# Patient Record
Sex: MALE | Race: WHITE | Employment: OTHER | URBAN - NONMETROPOLITAN AREA
[De-identification: names, ages, dates, MRNs, and addresses within clinical notes are randomized per-mention and may not be internally consistent; named-entity substitution may affect disease eponyms.]

---

## 2018-06-28 ENCOUNTER — HOSPITAL ENCOUNTER (EMERGENCY)
Age: 68
Discharge: HOME | End: 2018-06-29
Payer: OTHER GOVERNMENT

## 2018-06-28 VITALS
OXYGEN SATURATION: 100 % | SYSTOLIC BLOOD PRESSURE: 167 MMHG | WEIGHT: 130 LBS | TEMPERATURE: 98.4 F | HEART RATE: 85 BPM | BODY MASS INDEX: 19.77 KG/M2 | DIASTOLIC BLOOD PRESSURE: 85 MMHG | RESPIRATION RATE: 18 BRPM

## 2018-06-28 DIAGNOSIS — R33.9 URINARY RETENTION: Primary | ICD-10-CM

## 2018-06-28 PROCEDURE — 99283 EMERGENCY DEPT VISIT LOW MDM: CPT

## 2018-06-28 PROCEDURE — 81001 URINALYSIS AUTO W/SCOPE: CPT

## 2018-06-28 PROCEDURE — 87086 URINE CULTURE/COLONY COUNT: CPT

## 2018-06-28 ASSESSMENT — ENCOUNTER SYMPTOMS
DIARRHEA: 0
NAUSEA: 0
SHORTNESS OF BREATH: 0
VOMITING: 0
COUGH: 0
SORE THROAT: 0
COLOR CHANGE: 0
ABDOMINAL DISTENTION: 1
ABDOMINAL PAIN: 0
BACK PAIN: 0

## 2018-06-29 LAB
BACTERIA: ABNORMAL /HPF
BILIRUBIN URINE: NEGATIVE
BLOOD, URINE: ABNORMAL
CASTS 2: ABNORMAL /LPF
CASTS UA: ABNORMAL /LPF
CHARACTER, URINE: CLEAR
COLOR: ABNORMAL
CRYSTALS, UA: ABNORMAL
EPITHELIAL CELLS, UA: ABNORMAL /HPF
GLUCOSE URINE: NEGATIVE MG/DL
KETONES, URINE: NEGATIVE
LEUKOCYTE ESTERASE, URINE: ABNORMAL
MISCELLANEOUS 2: ABNORMAL
NITRITE, URINE: NEGATIVE
PH UA: 5
PROTEIN UA: NEGATIVE
RBC URINE: ABNORMAL /HPF
RENAL EPITHELIAL, UA: ABNORMAL
SPECIFIC GRAVITY, URINE: 1.02 (ref 1–1.03)
UROBILINOGEN, URINE: 0.2 EU/DL
WBC UA: ABNORMAL /HPF
YEAST: ABNORMAL

## 2018-07-01 LAB — URINE CULTURE REFLEX: NORMAL

## 2018-07-02 ENCOUNTER — HOSPITAL ENCOUNTER (EMERGENCY)
Age: 68
Discharge: LEFT BEFORE DISCHARGE | End: 2018-07-02
Payer: OTHER GOVERNMENT

## 2018-07-02 VITALS
BODY MASS INDEX: 19.46 KG/M2 | OXYGEN SATURATION: 98 % | SYSTOLIC BLOOD PRESSURE: 137 MMHG | WEIGHT: 128 LBS | TEMPERATURE: 97.7 F | HEART RATE: 101 BPM | DIASTOLIC BLOOD PRESSURE: 88 MMHG | RESPIRATION RATE: 16 BRPM

## 2018-07-02 DIAGNOSIS — Z46.6 ENCOUNTER FOR FOLEY CATHETER REMOVAL: Primary | ICD-10-CM

## 2018-07-02 PROCEDURE — 99282 EMERGENCY DEPT VISIT SF MDM: CPT

## 2018-07-02 ASSESSMENT — ENCOUNTER SYMPTOMS
WHEEZING: 0
ABDOMINAL PAIN: 0
EYE REDNESS: 0
DIARRHEA: 0
BACK PAIN: 0
NAUSEA: 0
SORE THROAT: 0
RHINORRHEA: 0
VOMITING: 0
SHORTNESS OF BREATH: 0
COUGH: 0
EYE DISCHARGE: 0

## 2018-07-02 ASSESSMENT — PAIN SCALES - GENERAL: PAINLEVEL_OUTOF10: 8

## 2018-07-02 ASSESSMENT — PAIN DESCRIPTION - PAIN TYPE: TYPE: ACUTE PAIN

## 2018-07-02 ASSESSMENT — PAIN DESCRIPTION - LOCATION: LOCATION: PENIS

## 2018-07-03 ENCOUNTER — HOSPITAL ENCOUNTER (EMERGENCY)
Age: 68
Discharge: HOME | End: 2018-07-03
Payer: MEDICARE

## 2018-07-03 VITALS
RESPIRATION RATE: 16 BRPM | WEIGHT: 128 LBS | OXYGEN SATURATION: 100 % | SYSTOLIC BLOOD PRESSURE: 126 MMHG | HEART RATE: 102 BPM | BODY MASS INDEX: 19.46 KG/M2 | DIASTOLIC BLOOD PRESSURE: 66 MMHG | TEMPERATURE: 98.6 F

## 2018-07-03 DIAGNOSIS — R31.0 GROSS HEMATURIA: ICD-10-CM

## 2018-07-03 DIAGNOSIS — T83.511A URINARY TRACT INFECTION ASSOCIATED WITH INDWELLING URETHRAL CATHETER, INITIAL ENCOUNTER (HCC): Primary | ICD-10-CM

## 2018-07-03 DIAGNOSIS — N39.0 URINARY TRACT INFECTION ASSOCIATED WITH INDWELLING URETHRAL CATHETER, INITIAL ENCOUNTER (HCC): Primary | ICD-10-CM

## 2018-07-03 LAB
BACTERIA: ABNORMAL /HPF
BILIRUBIN URINE: ABNORMAL
BLOOD, URINE: ABNORMAL
CASTS UA: ABNORMAL /LPF
CHARACTER, URINE: ABNORMAL
COLOR: ABNORMAL
CRYSTALS, UA: ABNORMAL
EPITHELIAL CELLS, UA: ABNORMAL /HPF
GLUCOSE URINE: NEGATIVE MG/DL
ICTOTEST: NEGATIVE
KETONES, URINE: ABNORMAL
LEUKOCYTE ESTERASE, URINE: ABNORMAL
NITRITE, URINE: POSITIVE
PH UA: 7
PROTEIN UA: >= 300
RBC URINE: > 200 /HPF
SPECIFIC GRAVITY, URINE: 1.02 (ref 1–1.03)
UROBILINOGEN, URINE: 1 EU/DL
WBC UA: > 200 /HPF

## 2018-07-03 PROCEDURE — 81001 URINALYSIS AUTO W/SCOPE: CPT

## 2018-07-03 PROCEDURE — 51702 INSERT TEMP BLADDER CATH: CPT

## 2018-07-03 PROCEDURE — 87086 URINE CULTURE/COLONY COUNT: CPT

## 2018-07-03 PROCEDURE — 99283 EMERGENCY DEPT VISIT LOW MDM: CPT

## 2018-07-03 RX ORDER — CIPROFLOXACIN 500 MG/1
500 TABLET, FILM COATED ORAL 2 TIMES DAILY
Qty: 20 TABLET | Refills: 0 | Status: SHIPPED | OUTPATIENT
Start: 2018-07-03 | End: 2018-07-13

## 2018-07-03 ASSESSMENT — ENCOUNTER SYMPTOMS
RHINORRHEA: 0
CHEST TIGHTNESS: 0
BACK PAIN: 0
GASTROINTESTINAL NEGATIVE: 1
COUGH: 0

## 2018-07-03 NOTE — ED PROVIDER NOTES
[address]  Pt Name: Brandon Sarmiento  MRN: [id number]  [place] 1950  Date of evaluation: 7/2/2018  Provider: JUAN CARLOS Radford CNP    CHIEF COMPLAINT       Chief Complaint   Patient presents with    Other     needs key catheter removed    Penis Pain       Nurses Notes reviewed and I agree except as noted in the HPI. HISTORY OF PRESENT ILLNESS    Brandon Sarmiento is a 69 y.o. male who presents to the emergency department from home for penile pain and catheter removal. The patient reports he was seen at the [facility] 4 days ago. They placed a key catheter due to the patient having inability to urinate. The patient reports no date was specified for removal. The patient was supposed to return today, but was unable too. The patient decided to attempt to remove the catheter by himself. There is an inflated catheter balloon. The patient reports pain in his penis. The patient reports he called [facility] and was advised to come to the ED. The patient denies hematuria. Triage notes and Nursing notes were reviewed by myself. Any discrepancies are addressed above. REVIEW OF SYSTEMS     Review of Systems   Constitutional: Negative for appetite change, chills, fatigue and fever. HENT: Negative for congestion, ear pain, rhinorrhea and sore throat. Eyes: Negative for discharge, redness and visual disturbance. Respiratory: Negative for cough, shortness of breath and wheezing. Cardiovascular: Negative for chest pain, palpitations and leg swelling. Gastrointestinal: Negative for abdominal pain, diarrhea, nausea and vomiting. Genitourinary: Positive for penile pain. Negative for decreased urine volume, difficulty urinating, dysuria and hematuria. Catheter removal   Musculoskeletal: Negative for arthralgias, back pain, joint swelling and neck pain. Skin: Negative for pallor and rash. Allergic/Immunologic: Negative for environmental allergies. Neurological: Negative for dizziness, syncope, weakness, light-headedness, numbness and headaches. Hematological: Negative for adenopathy. Psychiatric/Behavioral: Negative for confusion and suicidal ideas. The patient is not nervous/anxious. PAST MEDICAL HISTORY    has a past medical history of COPD (chronic obstructive pulmonary disease) ([address] and Hypertension. SURGICAL HISTORY      has a past surgical history that includes Coronary angioplasty with stent and back surgery. CURRENT MEDICATIONS       Discharge Medication List as of 7/2/2018  9:51 PM      CONTINUE these medications which have NOT CHANGED    Details   azithromycin (ZITHROMAX) 250 MG tablet Take 2 tabs po on day one. Take 1 tab po daily on days 2-5., Disp-6 tablet, R-0Print      clopidogrel (PLAVIX) 75 MG tablet Take 75 mg by mouth daily      morphine (MSIR) 15 MG tablet Take 15 mg by mouth every 6 hours as needed for Pain      oxyCODONE-acetaminophen (PERCOCET) 5-325 MG per tablet Take 1 tablet by mouth every 4 hours as needed for Pain      aspirin 81 MG tablet Take 81 mg by mouth daily      tamsulosin (FLOMAX) 0.4 MG capsule Take 0.4 mg by mouth daily      simvastatin (ZOCOR) 80 MG tablet Take 80 mg by mouth nightly      lisinopril (PRINIVIL;ZESTRIL) 20 MG tablet Take 20 mg by mouth daily      senna-docusate (PERICOLACE) 8.6-50 MG per tablet Take 1 tablet by mouth 3 times daily      omeprazole (PRILOSEC) 20 MG capsule Take 20 mg by mouth daily      sildenafil (VIAGRA) 100 MG tablet Take 100 mg by mouth once a week             ALLERGIES     has No Known Allergies. FAMILY HISTORY     has no family status information on file. family history is not on file. SOCIAL HISTORY      reports that he has been smoking. He has been smoking about 0.25 packs per day. He has never used smokeless tobacco. He reports that he drinks alcohol. He reports that he does not use drugs.     PHYSICAL EXAM     INITIAL VITALS:  weight is 128 lb (58.1 kg). His oral temperature is 97.7 °F (36.5 °C). His blood pressure is 137/88 and his pulse is 101. His respiration is 16 and oxygen saturation is 98%. Physical Exam   Constitutional: He is oriented to person, place, and time. He appears well-developed and well-nourished. HENT:   Head: Normocephalic and atraumatic. Right Ear: External ear normal.   Left Ear: External ear normal.   Eyes: Conjunctivae are normal. Right eye exhibits no discharge. Left eye exhibits no discharge. No scleral icterus. Neck: Normal range of motion. Neck supple. No JVD present. Pulmonary/Chest: Effort normal. No stridor. No respiratory distress. Abdominal: Soft. He exhibits no distension. Genitourinary:   Genitourinary Comments: Blood in urine in the urine drainage bag   Musculoskeletal: Normal range of motion. He exhibits no edema. Neurological: He is alert and oriented to person, place, and time. He exhibits normal muscle tone. GCS eye subscore is 4. GCS verbal subscore is 5. GCS motor subscore is 6. Skin: Skin is warm and dry. He is not diaphoretic. No erythema. Psychiatric: He has a normal mood and affect. His behavior is normal.   Nursing note and vitals reviewed. DIFFERENTIAL DIAGNOSIS:   Including but not limited to Catheter removal, Penile pain, Dysuria    DIAGNOSTIC RESULTS     EKG: All EKG's are interpreted by the Emergency Department Physician who either signs or Co-signs this chart in the absence of a cardiologist.  None    RADIOLOGY: non-plain film images(s) such as CT, Ultrasound and MRI are read by the radiologist.  Plain radiographic images are visualized and preliminarily interpreted by the emergency physician unless otherwise stated below.   No orders to display         LABS:   Labs Reviewed - No data to display      [address] [place] [address] [place]:   Vitals:    Vitals:    07/02/18 1913   BP: 137/88   Pulse: 101   Resp: 16   Temp: 97.7 °F (36.5 °C)   TempSrc: Oral   SpO2: 98%   Weight: 128 lb (58.1 kg)         Pertinent Labs & Imaging studies reviewed. (See chart for details)         The patient was seen and evaluated in a timely manner for penile pain and catheter removal after the patient attempted to remove a balloon catheter by himself. The patient's vital signs were stable. During the physical exam I noted blood in urine in the urine drainage bag. The patient was informed he must urinate before leaving the ED. The patient does not want to wait to urinate and wants to leave AMA. The patient was instructed to return to the ED if he cannot urinate within 6 hours. Despite discussion with patient about reasons for waiting for urination, the patient wants to leave against medical advice. The patient is alert and oriented times three, not altered or intoxicated in any fashion, and appears capable of making informed medical decisions. I explained plainly to the patient, the possible risks of leaving against medical advice, including worsening of medical condition. They verbalize understanding to these risks and accept sole responsibility for leaving today. I did explain to them, that although they are leaving against medical advice today, they should not hesitate to return to the emergency room at any time should they change their mind, need re-evaluation or desire further care. Strict return precautions and follow up instructions were discussed with the patient with which the patient agrees     Physical assessment findings, diagnostic testing(s) if applicable, and vital signs reviewed with patient/patient representative. Questions answered. Medications as directed, including OTC medications for supportive care. Education provided on medications. Differential diagnosis(s) discussed with patient/patient representative. Home care/self care instructions reviewed with patient/patient representative.   Patient is to follow-up with family care provider in 2-3 days if no

## 2018-07-03 NOTE — ED NOTES
Pt presents to ED an states that he had his catheter removed last night and he has not been able to pee since 0700. States he only peed twice last night but not very much. Pt states he feels full.  Candy at bedside and states to place a catheter in      [facility]  07/03/18 9818

## 2018-07-04 NOTE — ED PROVIDER NOTES
[address]  Pt Name: Jennifer Piña  MRN: [id number]  [place] 1950  Date of evaluation: 7/3/2018  Provider: JUAN CARLOS Villatoro CNP    CHIEF COMPLAINT       Chief Complaint   Patient presents with    Urinary Retention       Nurses Notes reviewed and I agree except as noted in the HPI. HISTORY OF PRESENT ILLNESS    Jennifer Piña is a 69 y.o. male who presents to the emergency department from home For complaints of urinary retention. I saw this patient yesterday for a Dickinson removal.  He was discharged from the [facility] approximately a week ago after a carotid endarterectomy. He is able to void since they placed a Dickinson. He missed his appointment at the [facility] yesterday said he attempted to removed the Dickinson with the balloon inflated at home yesterday. He was here with pain and discomfort and described the Dickinson removed. After we removed the Dickinson he left AMA with instructions that if he was unable to void that he needed to come back. She states her last weight was 7 AM      Triage notes and Nursing notes were reviewed by myself. Any discrepancies are addressed above. REVIEW OF SYSTEMS     Review of Systems   Constitutional: Negative for chills, fatigue and fever. HENT: Negative for congestion, ear discharge, ear pain, postnasal drip and rhinorrhea. Respiratory: Negative for cough and chest tightness. Cardiovascular: Negative. Gastrointestinal: Negative. Genitourinary: Positive for difficulty urinating, dysuria and hematuria. Negative for enuresis and flank pain. Musculoskeletal: Negative for back pain and joint swelling. Neurological: Negative for dizziness, light-headedness, numbness and headaches. Psychiatric/Behavioral: Negative for agitation, behavioral problems and confusion. PAST MEDICAL HISTORY    has a past medical history of COPD (chronic obstructive pulmonary disease) ([facility] [address] and Hypertension.     SURGICAL HISTORY      has a past physician. PROCEDURES:  None    FINAL IMPRESSION      1. Urinary tract infection associated with indwelling urethral catheter, initial encounter ([facility] [address]    2. Gross hematuria          DISPOSITION/PLAN   DISPOSITION Decision To Discharge 07/03/2018 07:49:35 PM        PATIENT REFERRED TO:  Shea Durham, [address] [facility] [address] [place] [address]  [phone]    Schedule an appointment as soon as possible for a visit in 1 day  For follow up    [facility] Urology  [address]  [address] [facility]  Schedule an appointment as soon as possible for a visit in 2 days  For follow up      DISCHARGE MEDICATIONS:  New Prescriptions    CIPROFLOXACIN (CIPRO) 500 MG TABLET    Take 1 tablet by mouth 2 times daily for 10 days       (Please note that portions of this note were completed with a voice recognition program.  Efforts were made to edit the dictations but occasionally words are mis-transcribed.)    JUAN CARLOS Pagan - JUAN CARLOS Lorenzana CNP  07/03/18 2008

## 2018-07-05 LAB — URINE CULTURE REFLEX: NORMAL

## 2018-07-07 ENCOUNTER — HOSPITAL ENCOUNTER (EMERGENCY)
Age: 68
Discharge: HOME | End: 2018-07-07
Payer: MEDICARE

## 2018-07-07 VITALS
DIASTOLIC BLOOD PRESSURE: 90 MMHG | TEMPERATURE: 98.1 F | BODY MASS INDEX: 19.4 KG/M2 | RESPIRATION RATE: 20 BRPM | SYSTOLIC BLOOD PRESSURE: 155 MMHG | HEART RATE: 68 BPM | HEIGHT: 68 IN | WEIGHT: 128 LBS | OXYGEN SATURATION: 95 %

## 2018-07-07 DIAGNOSIS — R33.9 URINARY RETENTION: Primary | ICD-10-CM

## 2018-07-07 DIAGNOSIS — T83.9XXA PROBLEM WITH FOLEY CATHETER, INITIAL ENCOUNTER (HCC): ICD-10-CM

## 2018-07-07 DIAGNOSIS — R31.9 HEMATURIA, UNSPECIFIED TYPE: ICD-10-CM

## 2018-07-07 LAB
ALBUMIN SERPL-MCNC: 3.5 G/DL (ref 3.5–5.1)
ALP BLD-CCNC: 86 U/L (ref 38–126)
ALT SERPL-CCNC: 6 U/L (ref 11–66)
ANION GAP SERPL CALCULATED.3IONS-SCNC: 12 MEQ/L (ref 8–16)
AST SERPL-CCNC: 12 U/L (ref 5–40)
BASOPHILS # BLD: 0.4 %
BASOPHILS ABSOLUTE: 0 THOU/MM3 (ref 0–0.1)
BILIRUB SERPL-MCNC: 0.2 MG/DL (ref 0.3–1.2)
BUN BLDV-MCNC: 18 MG/DL (ref 7–22)
CALCIUM SERPL-MCNC: 9.1 MG/DL (ref 8.5–10.5)
CHLORIDE BLD-SCNC: 103 MEQ/L (ref 98–111)
CO2: 26 MEQ/L (ref 23–33)
CREAT SERPL-MCNC: 1.1 MG/DL (ref 0.4–1.2)
EOSINOPHIL # BLD: 3.3 %
EOSINOPHILS ABSOLUTE: 0.3 THOU/MM3 (ref 0–0.4)
ERYTHROCYTE [DISTWIDTH] IN BLOOD BY AUTOMATED COUNT: 14.3 % (ref 11.5–14.5)
ERYTHROCYTE [DISTWIDTH] IN BLOOD BY AUTOMATED COUNT: 48.4 FL (ref 35–45)
GFR SERPL CREATININE-BSD FRML MDRD: 66 ML/MIN/1.73M2
GLUCOSE BLD-MCNC: 94 MG/DL (ref 70–108)
HCT VFR BLD CALC: 29 % (ref 42–52)
HEMOGLOBIN: 9.3 GM/DL (ref 14–18)
IMMATURE GRANS (ABS): 0.06 THOU/MM3 (ref 0–0.07)
IMMATURE GRANULOCYTES: 0.6 %
LYMPHOCYTES # BLD: 11.2 %
LYMPHOCYTES ABSOLUTE: 1.1 THOU/MM3 (ref 1–4.8)
MCH RBC QN AUTO: 30 PG (ref 26–33)
MCHC RBC AUTO-ENTMCNC: 32.1 GM/DL (ref 32.2–35.5)
MCV RBC AUTO: 93.5 FL (ref 80–94)
MONOCYTES # BLD: 4.6 %
MONOCYTES ABSOLUTE: 0.5 THOU/MM3 (ref 0.4–1.3)
NUCLEATED RED BLOOD CELLS: 0 /100 WBC
OSMOLALITY CALCULATION: 282.9 MOSMOL/KG (ref 275–300)
PLATELET # BLD: 291 THOU/MM3 (ref 130–400)
PMV BLD AUTO: 9.4 FL (ref 9.4–12.4)
POTASSIUM SERPL-SCNC: 4 MEQ/L (ref 3.5–5.2)
RBC # BLD: 3.1 MILL/MM3 (ref 4.7–6.1)
SEG NEUTROPHILS: 79.9 %
SEGMENTED NEUTROPHILS ABSOLUTE COUNT: 8.1 THOU/MM3 (ref 1.8–7.7)
SODIUM BLD-SCNC: 141 MEQ/L (ref 135–145)
TOTAL PROTEIN: 6.2 G/DL (ref 6.1–8)
WBC # BLD: 10.2 THOU/MM3 (ref 4.8–10.8)

## 2018-07-07 PROCEDURE — 80053 COMPREHEN METABOLIC PANEL: CPT

## 2018-07-07 PROCEDURE — 36415 COLL VENOUS BLD VENIPUNCTURE: CPT

## 2018-07-07 PROCEDURE — 51702 INSERT TEMP BLADDER CATH: CPT

## 2018-07-07 PROCEDURE — 99283 EMERGENCY DEPT VISIT LOW MDM: CPT

## 2018-07-07 PROCEDURE — 85025 COMPLETE CBC W/AUTO DIFF WBC: CPT

## 2018-07-07 ASSESSMENT — ENCOUNTER SYMPTOMS
BLOOD IN STOOL: 0
CHEST TIGHTNESS: 0
WHEEZING: 0
SINUS PRESSURE: 0
SHORTNESS OF BREATH: 0
COLOR CHANGE: 0
COUGH: 0
CONSTIPATION: 0
SORE THROAT: 0
BACK PAIN: 0
RHINORRHEA: 0
ABDOMINAL DISTENTION: 0
PHOTOPHOBIA: 0
ABDOMINAL PAIN: 1
VOICE CHANGE: 0
EYE REDNESS: 0
DIARRHEA: 0
NAUSEA: 0
VOMITING: 0

## 2018-07-07 NOTE — ED TRIAGE NOTES
Arrives to ER for evaluation of urinary catheter issues. Patient states is not draining and causing a lot of pressure. Feels as if there is a clot blocking catheter from draining. Patient was seen on 6/28 for urinary retention. He was discharged from the [place] over a week ago after a carotid endarterectomy. They placed a Key at that time. He missed his appointment at the [place] 7/2 and attempted to remove the Key himself at home with the balloon inflated. He was here in ER 7/2 with pain and discomfort and described the Key being removed. After had key removed in the ER patient left AMA with instructions that if he was unable to void that he needed to come back. Returned on 7/3 with dysuria, retention and hematuria. Key was re-inserted on 7/3 as ordered by provider.

## 2018-07-07 NOTE — ED PROVIDER NOTES
[facility] Emergency Department    CHIEF COMPLAINT       Chief Complaint   Patient presents with    Urinary Catheter Problem       Nurses Notes reviewed and I agree except as noted in the HPI. HISTORY OF PRESENT ILLNESS    Lela Washington is a 69 y.o. male who presents to the ED for evaluation of problems with his catheter. The patient had carotid endarterectomy approximately 2 weeks ago and had a key placed due to the patient not being able to urinate. The patient was supposed to have his key removed 5 days ago but was unable to make his appointment and attempted to pull it out himself with the balloon inflated. The patient was in the ED on 07/03/18 after attempting to remove his key and had a new one placed. Today he reports urinary retention, hematuria and low abdominal pressure. He denies pain, fever or chills. The patient is currently taking Plavix and Aspirin. He denies leakage around the catheter. Pain description:  Onset: today  Location: problems with his catheter  Duration: constant   Character: none  Aggravating factors: none  Severity: moderate     Experienced previously: Yes    HPI was provided by the patient. REVIEW OF SYSTEMS     Review of Systems   Constitutional: Negative for appetite change, chills, diaphoresis, fatigue, fever and unexpected weight change. HENT: Negative for congestion, hearing loss, postnasal drip, rhinorrhea, sinus pressure, sore throat and voice change. Eyes: Negative for photophobia, redness and visual disturbance. Respiratory: Negative for cough, chest tightness, shortness of breath and wheezing. Cardiovascular: Negative for chest pain and palpitations. Gastrointestinal: Positive for abdominal pain (pressure). Negative for abdominal distention, blood in stool, constipation, diarrhea, nausea and vomiting. Endocrine: Negative for cold intolerance, heat intolerance, polydipsia, polyphagia and polyuria.    Genitourinary: Positive for difficulty electrolyte abnormality     DIAGNOSTIC RESULTS     EKG: All EKG's are interpreted by the Emergency Department Physician who either signs or Co-signs this chart in the absence of a cardiologist.  None    RADIOLOGY: non-plain film images(s) such as CT, Ultrasound and MRI are read by the radiologist.  Plain radiographic images are visualized and preliminarily interpreted by the emergency physician unless otherwise stated below. No orders to display         LABS:   Labs Reviewed   CBC WITH AUTO DIFFERENTIAL - Abnormal; Notable for the following:        Result Value    RBC 3.10 (*)     Hemoglobin 9.3 (*)     Hematocrit 29.0 (*)     MCHC 32.1 (*)     RDW-SD 48.4 (*)     Segs Absolute 8.1 (*)     All other components within normal limits   COMPREHENSIVE METABOLIC PANEL - Abnormal; Notable for the following: Total Bilirubin 0.2 (*)     ALT 6 (*)     All other components within normal limits   GLOMERULAR FILTRATION RATE, ESTIMATED - Abnormal; Notable for the following:     Est, Glom Filt Rate 66 (*)     All other components within normal limits   ANION GAP   OSMOLALITY       EMERGENCY DEPARTMENT COURSE:   Vitals:    Vitals:    07/07/18 1454   BP: (!) 155/90   Pulse: 68   Resp: 20   Temp: 98.1 °F (36.7 °C)   TempSrc: Oral   SpO2: 95%   Weight: 128 lb (58.1 kg)   Height: 5' 8\" (1.727 m)       MDM    Patient was seen and evaluated in the emergency department, patient appeared to be in no acute distress. Vital signs were obtained, no significant findings were noted, this  hypertension was noted. Labs obtained, no significant findings noted. Dickinson catheter was replaced with a triple-lumen catheter for bladder irrigation. Significant urine output was noted with this, continues bladder irrigation was administered. The patient refused admission but was willing to stay for 3 hours for bladder irrigation. By the time we finished irrigating, output was generally clear.   I discussed my findings my plan of care the patient he

## 2018-07-13 ENCOUNTER — HOSPITAL ENCOUNTER (EMERGENCY)
Age: 68
Discharge: HOME | End: 2018-07-13
Attending: EMERGENCY MEDICINE
Payer: OTHER GOVERNMENT

## 2018-07-13 VITALS
WEIGHT: 128 LBS | HEIGHT: 68 IN | DIASTOLIC BLOOD PRESSURE: 95 MMHG | RESPIRATION RATE: 21 BRPM | HEART RATE: 96 BPM | BODY MASS INDEX: 19.4 KG/M2 | OXYGEN SATURATION: 96 % | TEMPERATURE: 97.9 F | SYSTOLIC BLOOD PRESSURE: 148 MMHG

## 2018-07-13 DIAGNOSIS — Z46.6 ENCOUNTER FOR FOLEY CATHETER REMOVAL: Primary | ICD-10-CM

## 2018-07-13 PROCEDURE — 99283 EMERGENCY DEPT VISIT LOW MDM: CPT

## 2018-07-14 NOTE — ED PROVIDER NOTES
[facility] EMERGENCY DEPT      CHIEF COMPLAINT       Chief Complaint   Patient presents with    Other     Catheter removal        Nurses Notes reviewed and I agree except as noted in the HPI. HISTORY OF PRESENT ILLNESS    Jaky Limon is a 69 y.o. male who presents for a catheter removal. The patient has a catheter in place status post a carotid endarterectomy. The patient states that he went to the [address] [place] [address] clinic on Wednesday who stated that he could have the catheter removed at any time, however his provider at the [address] [place] [address] was unable to remove the catheter and referred him to either here or [address] [place] [address] Urology. The patient does not want to see [facility]. He states that the catheter is annoying. His cath was placed here yesterday. Onset: cather placed yesterday, wants removed  Duration: continuous  Timing: yesterday  Location of Pain: none  Intesity/severity: none  Modifying Factors: none  Relieved by; NA  Previous Episodes; Tx Before arrival: None  REVIEW OF SYSTEMS      Review of Systems   Constitutional: Negative for fever, chills, diaphoresis and fatigue. HENT: Negative for congestion, drooling, facial swelling and sore throat. Eyes: Negative for photophobia, pain and discharge. Respiratory: Negative for cough, shortness of breath, wheezing and stridor. Cardiovascular: Negative for chest pain, palpitations and leg swelling. Gastrointestinal: Negative for abdominal pain, blood in stool and abdominal distention. Endocrine: Negative for cold intolerance, heat intolerance, polydipsia and polyuria. Genitourinary: Has a catheter in place which he wants removed. Negative for dysuria, urgency, hematuria and difficulty urinating. Musculoskeletal: Negative for gait problem, neck pain and neck stiffness. Allergic/Immunologic: Negative for environmental allergies, food allergies and immunocompromised state. Skin; No rash, No itching  Neurological: Negative for seizures, weakness and numbness. Hematological: Negative for adenopathy. Does not bruise/bleed easily. Psychiatric/Behavioral: Negative for hallucinations, confusion and agitation. PAST MEDICAL HISTORY    has a past medical history of COPD (chronic obstructive pulmonary disease) ([address] and Hypertension. SURGICAL HISTORY      has a past surgical history that includes Coronary angioplasty with stent; back surgery; Neck surgery; and Carotid-subclavian Bypass Graft. CURRENT MEDICATIONS       Previous Medications    ASPIRIN 81 MG TABLET    Take 81 mg by mouth daily    AZITHROMYCIN (ZITHROMAX) 250 MG TABLET    Take 2 tabs po on day one. Take 1 tab po daily on days 2-5. CIPROFLOXACIN (CIPRO) 500 MG TABLET    Take 1 tablet by mouth 2 times daily for 10 days    CLOPIDOGREL (PLAVIX) 75 MG TABLET    Take 75 mg by mouth daily    LISINOPRIL (PRINIVIL;ZESTRIL) 20 MG TABLET    Take 20 mg by mouth daily    MORPHINE (MSIR) 15 MG TABLET    Take 15 mg by mouth every 6 hours as needed for Pain    OMEPRAZOLE (PRILOSEC) 20 MG CAPSULE    Take 20 mg by mouth daily    OXYCODONE-ACETAMINOPHEN (PERCOCET) 5-325 MG PER TABLET    Take 1 tablet by mouth every 4 hours as needed for Pain    SENNA-DOCUSATE (PERICOLACE) 8.6-50 MG PER TABLET    Take 1 tablet by mouth 3 times daily    SILDENAFIL (VIAGRA) 100 MG TABLET    Take 100 mg by mouth once a week    SIMVASTATIN (ZOCOR) 80 MG TABLET    Take 80 mg by mouth nightly    TAMSULOSIN (FLOMAX) 0.4 MG CAPSULE    Take 0.4 mg by mouth daily       ALLERGIES     has No Known Allergies. FAMILY HISTORY     has no family status information on file. family history is not on file. SOCIAL HISTORY      reports that he has been smoking Cigarettes. He has a 32.50 pack-year smoking history. He has never used smokeless tobacco. He reports that he drinks alcohol. He reports that he does not use drugs. PHYSICAL EXAM     INITIAL VITALS:  height is 5' 8\" (1.727 m) and weight is 128 lb (58.1 kg).  His oral temperature is 97.9 °F BP: (!) 148/95   Pulse: 96   Resp: 21   Temp: 97.9 °F (36.6 °C)   TempSrc: Oral   SpO2: 96%   Weight: 128 lb (58.1 kg)   Height: 5' 8\" (1.727 m)       The patient was seen and evaluated in a timely manner for a urinary catheter removal. His vital signs were stable with some hypertension noted. The patient's blood pressure was elevated while in the ED and at the time of discharge. The patient has a medical history of hypertension and is compliant with their antihypertensive medications. The patient was informed of their elevated blood pressure while in the ED and was advised to follow up with their PCP for blood pressure recheck and further management of their antihypertensive medications. During the physical exam I noted a catheter. The patient's surgical scars were healing well and had no cellulitis or drainage. Within the department, the patient's key catheter was removed. The patient responded well to treatment, and I noted his condition to remain stable. I decided that the patient could be discharged home with instructions to follow up with urology as needed. I explained my proposed course of discharge to the patient and his family at bedside, and they verbalized understanding and agreement with my proposed plan. All their questions were addressed at bedside. The patient will return to the emergency department for any new or worsening symptoms. CRITICAL CARE:   None    CONSULTS:  None    PROCEDURES:  None    FINAL IMPRESSION      1.  Encounter for Key catheter removal          DISPOSITION/PLAN   Decision To Discharge    PATIENT REFERRED TO:  [facility] EMERGENCY DEPT  [address]  Go to   As needed    MD Joceline Langford [facility]  [facility]  [phone]    Call on 7/16/2018  RE-CHECK AND FURTHER TESTING AS NEEDED      DISCHARGE MEDICATIONS:  New Prescriptions    No medications on file       (Please note that portions of this note

## 2018-07-15 ENCOUNTER — HOSPITAL ENCOUNTER (EMERGENCY)
Age: 68
Discharge: HOME | End: 2018-07-15
Payer: MEDICARE

## 2018-07-15 VITALS
OXYGEN SATURATION: 99 % | WEIGHT: 128 LBS | DIASTOLIC BLOOD PRESSURE: 72 MMHG | BODY MASS INDEX: 19.4 KG/M2 | SYSTOLIC BLOOD PRESSURE: 105 MMHG | HEIGHT: 68 IN | RESPIRATION RATE: 14 BRPM | HEART RATE: 90 BPM | TEMPERATURE: 98.7 F

## 2018-07-15 DIAGNOSIS — R33.9 URINARY RETENTION: ICD-10-CM

## 2018-07-15 DIAGNOSIS — N30.00 ACUTE CYSTITIS WITHOUT HEMATURIA: Primary | ICD-10-CM

## 2018-07-15 LAB
ALBUMIN SERPL-MCNC: 3.4 G/DL (ref 3.5–5.1)
ALP BLD-CCNC: 95 U/L (ref 38–126)
ALT SERPL-CCNC: 6 U/L (ref 11–66)
ANION GAP SERPL CALCULATED.3IONS-SCNC: 12 MEQ/L (ref 8–16)
AST SERPL-CCNC: 9 U/L (ref 5–40)
BACTERIA: ABNORMAL /HPF
BASOPHILS # BLD: 0.2 %
BASOPHILS ABSOLUTE: 0 THOU/MM3 (ref 0–0.1)
BILIRUB SERPL-MCNC: 0.3 MG/DL (ref 0.3–1.2)
BILIRUBIN URINE: NEGATIVE
BLOOD, URINE: ABNORMAL
BUN BLDV-MCNC: 21 MG/DL (ref 7–22)
CALCIUM SERPL-MCNC: 9.1 MG/DL (ref 8.5–10.5)
CASTS 2: ABNORMAL /LPF
CASTS UA: ABNORMAL /LPF
CHARACTER, URINE: ABNORMAL
CHLORIDE BLD-SCNC: 104 MEQ/L (ref 98–111)
CO2: 25 MEQ/L (ref 23–33)
COLOR: YELLOW
CREAT SERPL-MCNC: 1.5 MG/DL (ref 0.4–1.2)
CRYSTALS, UA: ABNORMAL
EOSINOPHIL # BLD: 0.7 %
EOSINOPHILS ABSOLUTE: 0.1 THOU/MM3 (ref 0–0.4)
EPITHELIAL CELLS, UA: ABNORMAL /HPF
ERYTHROCYTE [DISTWIDTH] IN BLOOD BY AUTOMATED COUNT: 14.2 % (ref 11.5–14.5)
ERYTHROCYTE [DISTWIDTH] IN BLOOD BY AUTOMATED COUNT: 46.9 FL (ref 35–45)
GFR SERPL CREATININE-BSD FRML MDRD: 46 ML/MIN/1.73M2
GLUCOSE BLD-MCNC: 129 MG/DL (ref 70–108)
GLUCOSE URINE: NEGATIVE MG/DL
HCT VFR BLD CALC: 30.9 % (ref 42–52)
HEMOGLOBIN: 9.8 GM/DL (ref 14–18)
IMMATURE GRANS (ABS): 0.09 THOU/MM3 (ref 0–0.07)
IMMATURE GRANULOCYTES: 0.5 %
KETONES, URINE: ABNORMAL
LEUKOCYTE ESTERASE, URINE: ABNORMAL
LYMPHOCYTES # BLD: 4.4 %
LYMPHOCYTES ABSOLUTE: 0.7 THOU/MM3 (ref 1–4.8)
MCH RBC QN AUTO: 28.8 PG (ref 26–33)
MCHC RBC AUTO-ENTMCNC: 31.7 GM/DL (ref 32.2–35.5)
MCV RBC AUTO: 90.9 FL (ref 80–94)
MISCELLANEOUS 2: ABNORMAL
MONOCYTES # BLD: 6.1 %
MONOCYTES ABSOLUTE: 1 THOU/MM3 (ref 0.4–1.3)
NITRITE, URINE: NEGATIVE
NUCLEATED RED BLOOD CELLS: 0 /100 WBC
OSMOLALITY CALCULATION: 285.9 MOSMOL/KG (ref 275–300)
PH UA: 6
PLATELET # BLD: 238 THOU/MM3 (ref 130–400)
PMV BLD AUTO: 9.7 FL (ref 9.4–12.4)
POTASSIUM SERPL-SCNC: 4.1 MEQ/L (ref 3.5–5.2)
PROTEIN UA: ABNORMAL
RBC # BLD: 3.4 MILL/MM3 (ref 4.7–6.1)
RBC URINE: ABNORMAL /HPF
RENAL EPITHELIAL, UA: ABNORMAL
SEG NEUTROPHILS: 88.1 %
SEGMENTED NEUTROPHILS ABSOLUTE COUNT: 15 THOU/MM3 (ref 1.8–7.7)
SODIUM BLD-SCNC: 141 MEQ/L (ref 135–145)
SPECIFIC GRAVITY, URINE: 1.01 (ref 1–1.03)
TOTAL PROTEIN: 6.1 G/DL (ref 6.1–8)
UROBILINOGEN, URINE: 0.2 EU/DL
WBC # BLD: 17 THOU/MM3 (ref 4.8–10.8)
WBC UA: ABNORMAL /HPF
YEAST: ABNORMAL

## 2018-07-15 PROCEDURE — 81001 URINALYSIS AUTO W/SCOPE: CPT

## 2018-07-15 PROCEDURE — 87184 SC STD DISK METHOD PER PLATE: CPT

## 2018-07-15 PROCEDURE — 51702 INSERT TEMP BLADDER CATH: CPT

## 2018-07-15 PROCEDURE — 87077 CULTURE AEROBIC IDENTIFY: CPT

## 2018-07-15 PROCEDURE — 80053 COMPREHEN METABOLIC PANEL: CPT

## 2018-07-15 PROCEDURE — 6370000000 HC RX 637 (ALT 250 FOR IP): Performed by: PHYSICIAN ASSISTANT

## 2018-07-15 PROCEDURE — 85025 COMPLETE CBC W/AUTO DIFF WBC: CPT

## 2018-07-15 PROCEDURE — 87086 URINE CULTURE/COLONY COUNT: CPT

## 2018-07-15 PROCEDURE — 87186 SC STD MICRODIL/AGAR DIL: CPT

## 2018-07-15 PROCEDURE — 36415 COLL VENOUS BLD VENIPUNCTURE: CPT

## 2018-07-15 PROCEDURE — 99284 EMERGENCY DEPT VISIT MOD MDM: CPT

## 2018-07-15 RX ORDER — CIPROFLOXACIN 500 MG/1
500 TABLET, FILM COATED ORAL ONCE
Status: COMPLETED | OUTPATIENT
Start: 2018-07-15 | End: 2018-07-15

## 2018-07-15 RX ORDER — CIPROFLOXACIN 500 MG/1
500 TABLET, FILM COATED ORAL 2 TIMES DAILY
Qty: 14 TABLET | Refills: 0 | Status: SHIPPED | OUTPATIENT
Start: 2018-07-15 | End: 2018-07-22

## 2018-07-15 RX ADMIN — CIPROFLOXACIN HYDROCHLORIDE 500 MG: 500 TABLET, FILM COATED ORAL at 19:38

## 2018-07-15 ASSESSMENT — ENCOUNTER SYMPTOMS
EYE DISCHARGE: 0
NAUSEA: 0
BACK PAIN: 0
COUGH: 0
VOMITING: 0
RHINORRHEA: 0
SORE THROAT: 0
WHEEZING: 0
ABDOMINAL PAIN: 0
EYE REDNESS: 0
DIARRHEA: 0
SHORTNESS OF BREATH: 0

## 2018-07-15 ASSESSMENT — PAIN DESCRIPTION - PAIN TYPE: TYPE: ACUTE PAIN

## 2018-07-15 ASSESSMENT — PAIN DESCRIPTION - ORIENTATION: ORIENTATION: LOWER

## 2018-07-15 ASSESSMENT — PAIN SCALES - GENERAL: PAINLEVEL_OUTOF10: 7

## 2018-07-15 ASSESSMENT — PAIN DESCRIPTION - LOCATION: LOCATION: ABDOMEN

## 2018-07-15 NOTE — ED PROVIDER NOTES
Lymphadenopathy:     He has no cervical adenopathy. Neurological: He is alert and oriented to person, place, and time. He has normal strength. No sensory deficit. Gait normal.   Skin: Skin is warm, dry and intact. No rash noted. He is not diaphoretic. No pallor. Psychiatric: He has a normal mood and affect. His speech is normal and behavior is normal. Thought content normal.   Nursing note and vitals reviewed. DIFFERENTIAL DIAGNOSIS:   Including but not limited to: UTI, bladder infection, urinary retention,  ARMAND, and urethritis. DIAGNOSTIC RESULTS     EKG: All EKG's are interpreted by the Emergency Department Physician who either signs or Co-signs this chart in the absence of a cardiologist.  None    RADIOLOGY: non-plain film images(s) such as CT, Ultrasound and MRI are read by the radiologist.  Plain radiographic images are visualized and preliminarily interpreted by the emergency physician unless otherwise stated below.   No orders to display       LABS:   Labs Reviewed   CBC WITH AUTO DIFFERENTIAL - Abnormal; Notable for the following:        Result Value    WBC 17.0 (*)     RBC 3.40 (*)     Hemoglobin 9.8 (*)     Hematocrit 30.9 (*)     MCHC 31.7 (*)     RDW-SD 46.9 (*)     Segs Absolute 15.0 (*)     Lymphocytes # 0.7 (*)     Immature Grans (Abs) 0.09 (*)     All other components within normal limits   COMPREHENSIVE METABOLIC PANEL - Abnormal; Notable for the following:     Glucose 129 (*)     CREATININE 1.5 (*)     Alb 3.4 (*)     ALT 6 (*)     All other components within normal limits   URINE WITH REFLEXED MICRO - Abnormal; Notable for the following:     Ketones, Urine TRACE (*)     Blood, Urine SMALL (*)     Protein, UA TRACE (*)     Leukocyte Esterase, Urine MODERATE (*)     Character, Urine CLOUDY (*)     All other components within normal limits   GLOMERULAR FILTRATION RATE, ESTIMATED - Abnormal; Notable for the following:     Est, Glom Filt Rate 46 (*)     All other components within normal limits   URINE CULTURE, REFLEXED    Narrative:     Source: cath urine       Site: catheter          Current Antibiotics: not stated   ANION GAP   OSMOLALITY       EMERGENCY DEPARTMENT COURSE:   Vitals:    Vitals:    07/15/18 1505 07/15/18 1507 07/15/18 1608 07/15/18 1731   BP: 137/77  118/69 105/72   Pulse: 100  97 90   Resp: 16  17 14   Temp:  98.7 °F (37.1 °C)     TempSrc:  Oral     SpO2: 97%  97% 99%   Weight: 128 lb (58.1 kg)      Height: 5' 8\" (1.727 m)        6:33 PM: The patient was reevaluated. I discussed the option of admission with the patient who declined and stated that he would like to go home. The patient was seen within the ED today for the evaluation of urinary retention. The patient arrived in no acute distress and in stable condition. Within the department, I observed the patient's vital signs to be within acceptable range. On exam, I appreciated a well-healing left carotid surgical incision, mild suprapubic tenderness, bladder distention, and normal heart and lung sounds. Laboratory work was reassuring though the patient's WBC was 17.0 and his urine was positive for acute cystitis. Within the department, the patient was treated with Cipro for infection. I wanted to consult the hospitalist and urology on this patient, but the patient refuses admission. I observed the patient's condition to remain stable during the duration of his stay. He declines admission so will be discharged. I answered all questions that were asked. He was discharged home in stable condition, and the patient will return to the ED if his symptoms become more severe in nature or otherwise change. I advised the patient to follow-up with [facility] as soon as possible. Wife states patient has an appointment tomorrow. I also discussed return to ED precautions with the patient who verbalized understanding. CRITICAL CARE:   None    CONSULTS:  None    PROCEDURES:  None    FINAL IMPRESSION      1.  Acute cystitis without

## 2018-07-17 LAB
ORGANISM: ABNORMAL
URINE CULTURE REFLEX: ABNORMAL

## 2018-07-18 NOTE — PROGRESS NOTES
Pharmacy Note  ED Culture Follow-up    Carla Carias is a 69 y.o. male. Allergies: Patient has no known allergies. Labs:  Lab Results   Component Value Date    BUN 21 07/15/2018    CREATININE 1.5 (H) 07/15/2018    WBC 17.0 (H) 07/15/2018     Estimated Creatinine Clearance: 38 mL/min (A) (based on SCr of 1.5 mg/dL (H)). Current antimicrobials:   · cipro    ASSESSMENT:  Micro results:   Urine culture: positive for citrobacter      PLAN:  Need for intervention: No 2/2 s-cipro   Discussed with: Delfin Melo PA-C  Chosen treatment:    · Patient already on appropriate treatment as above    Patient response:   · No need to contact patient    Called/sent in prescription to: Not applicable    Please call with any questions.  Meng Parada, PharmD 6:53 PM 7/18/2018

## 2018-08-01 ENCOUNTER — HOSPITAL ENCOUNTER (EMERGENCY)
Age: 68
Discharge: HOME | End: 2018-08-01
Attending: EMERGENCY MEDICINE
Payer: MEDICARE

## 2018-08-01 VITALS
WEIGHT: 128 LBS | SYSTOLIC BLOOD PRESSURE: 122 MMHG | RESPIRATION RATE: 16 BRPM | DIASTOLIC BLOOD PRESSURE: 73 MMHG | TEMPERATURE: 98 F | HEART RATE: 98 BPM | BODY MASS INDEX: 19.46 KG/M2 | OXYGEN SATURATION: 98 %

## 2018-08-01 DIAGNOSIS — Z46.6 ENCOUNTER FOR FOLEY CATHETER REMOVAL: Primary | ICD-10-CM

## 2018-08-01 DIAGNOSIS — N13.9 LOWER URINARY TRACT OBSTRUCTIVE SYNDROME: ICD-10-CM

## 2018-08-01 DIAGNOSIS — N48.1 BALANITIS: ICD-10-CM

## 2018-08-01 DIAGNOSIS — Z78.9 UNCIRCUMCISED MALE: ICD-10-CM

## 2018-08-01 LAB
ALBUMIN SERPL-MCNC: 3.8 G/DL (ref 3.5–5.1)
ALP BLD-CCNC: 88 U/L (ref 38–126)
ALT SERPL-CCNC: 6 U/L (ref 11–66)
ANION GAP SERPL CALCULATED.3IONS-SCNC: 12 MEQ/L (ref 8–16)
AST SERPL-CCNC: 10 U/L (ref 5–40)
BACTERIA: ABNORMAL /HPF
BASOPHILS # BLD: 0.4 %
BASOPHILS ABSOLUTE: 0 THOU/MM3 (ref 0–0.1)
BILIRUB SERPL-MCNC: 0.2 MG/DL (ref 0.3–1.2)
BILIRUBIN URINE: NEGATIVE
BLOOD, URINE: ABNORMAL
BUN BLDV-MCNC: 21 MG/DL (ref 7–22)
CALCIUM SERPL-MCNC: 9.4 MG/DL (ref 8.5–10.5)
CASTS UA: ABNORMAL /LPF
CHARACTER, URINE: ABNORMAL
CHLORIDE BLD-SCNC: 102 MEQ/L (ref 98–111)
CO2: 25 MEQ/L (ref 23–33)
COLOR: YELLOW
CREAT SERPL-MCNC: 1.1 MG/DL (ref 0.4–1.2)
CRYSTALS, UA: ABNORMAL
EOSINOPHIL # BLD: 5 %
EOSINOPHILS ABSOLUTE: 0.6 THOU/MM3 (ref 0–0.4)
EPITHELIAL CELLS, UA: ABNORMAL /HPF
ERYTHROCYTE [DISTWIDTH] IN BLOOD BY AUTOMATED COUNT: 14.2 % (ref 11.5–14.5)
ERYTHROCYTE [DISTWIDTH] IN BLOOD BY AUTOMATED COUNT: 46 FL (ref 35–45)
GFR SERPL CREATININE-BSD FRML MDRD: 66 ML/MIN/1.73M2
GLUCOSE BLD-MCNC: 105 MG/DL (ref 70–108)
GLUCOSE URINE: NEGATIVE MG/DL
HCT VFR BLD CALC: 34.3 % (ref 42–52)
HEMOGLOBIN: 10.8 GM/DL (ref 14–18)
IMMATURE GRANS (ABS): 0.07 THOU/MM3 (ref 0–0.07)
IMMATURE GRANULOCYTES: 0.6 %
KETONES, URINE: ABNORMAL
LEUKOCYTE ESTERASE, URINE: ABNORMAL
LYMPHOCYTES # BLD: 19 %
LYMPHOCYTES ABSOLUTE: 2.1 THOU/MM3 (ref 1–4.8)
MCH RBC QN AUTO: 28.1 PG (ref 26–33)
MCHC RBC AUTO-ENTMCNC: 31.5 GM/DL (ref 32.2–35.5)
MCV RBC AUTO: 89.1 FL (ref 80–94)
MONOCYTES # BLD: 6.1 %
MONOCYTES ABSOLUTE: 0.7 THOU/MM3 (ref 0.4–1.3)
MUCUS: ABNORMAL
NITRITE, URINE: POSITIVE
NUCLEATED RED BLOOD CELLS: 0 /100 WBC
OSMOLALITY CALCULATION: 280.9 MOSMOL/KG (ref 275–300)
PH UA: 5
PLATELET # BLD: 275 THOU/MM3 (ref 130–400)
PMV BLD AUTO: 9.6 FL (ref 9.4–12.4)
POTASSIUM SERPL-SCNC: 4 MEQ/L (ref 3.5–5.2)
PROTEIN UA: 100
RBC # BLD: 3.85 MILL/MM3 (ref 4.7–6.1)
RBC URINE: ABNORMAL /HPF
SEG NEUTROPHILS: 68.9 %
SEGMENTED NEUTROPHILS ABSOLUTE COUNT: 7.6 THOU/MM3 (ref 1.8–7.7)
SODIUM BLD-SCNC: 139 MEQ/L (ref 135–145)
SPECIFIC GRAVITY, URINE: 1.02 (ref 1–1.03)
TOTAL PROTEIN: 6.9 G/DL (ref 6.1–8)
UROBILINOGEN, URINE: 0.2 EU/DL
WBC # BLD: 11.1 THOU/MM3 (ref 4.8–10.8)
WBC UA: ABNORMAL /HPF

## 2018-08-01 PROCEDURE — 87186 SC STD MICRODIL/AGAR DIL: CPT

## 2018-08-01 PROCEDURE — 99283 EMERGENCY DEPT VISIT LOW MDM: CPT

## 2018-08-01 PROCEDURE — 87147 CULTURE TYPE IMMUNOLOGIC: CPT

## 2018-08-01 PROCEDURE — 80053 COMPREHEN METABOLIC PANEL: CPT

## 2018-08-01 PROCEDURE — 36415 COLL VENOUS BLD VENIPUNCTURE: CPT

## 2018-08-01 PROCEDURE — 85025 COMPLETE CBC W/AUTO DIFF WBC: CPT

## 2018-08-01 PROCEDURE — 81001 URINALYSIS AUTO W/SCOPE: CPT

## 2018-08-01 PROCEDURE — 87086 URINE CULTURE/COLONY COUNT: CPT

## 2018-08-01 PROCEDURE — 87077 CULTURE AEROBIC IDENTIFY: CPT

## 2018-08-01 RX ORDER — NYSTATIN AND TRIAMCINOLONE ACETONIDE 100000; 1 [USP'U]/G; MG/G
OINTMENT TOPICAL
Qty: 1 TUBE | Refills: 0 | Status: SHIPPED | OUTPATIENT
Start: 2018-08-01

## 2018-08-01 ASSESSMENT — ENCOUNTER SYMPTOMS
VOMITING: 0
SORE THROAT: 0
RHINORRHEA: 0
DIARRHEA: 0
COUGH: 0
SHORTNESS OF BREATH: 0
EYE REDNESS: 0
WHEEZING: 0
BACK PAIN: 0
EYE DISCHARGE: 0
ABDOMINAL PAIN: 0
NAUSEA: 0

## 2018-08-01 ASSESSMENT — PAIN SCALES - GENERAL: PAINLEVEL_OUTOF10: 4

## 2018-08-01 NOTE — ED PROVIDER NOTES
Physician Note:    Patient was seen by DHRUV Santillan and I co-managed the case    I have personally performed and participated in the history, exam and medical decision making and agree with all pertinent clinical information. I have also reviewed and agree with the past medical, family and social history unless otherwise noted. Patient presented to the emergency room due to wanting to have the Dickinson catheter removed. Patient had obstructive uropathy since he had a carotid endarterectomy and had been on Dickinson catheter since then. Physical examination showed patient is uncircumcised. Patient had a creamy drainage on the foreskin and likewise the heads of the penis, Dickinson catheter is intact    Evaluation: Agree above , seen the patient and discussed the diagnosis and treatment plans     FINAL IMPRESSION       1. Encounter for Dickinson catheter removal per patient request    2. Balanitis    3. Uncircumcised male    3. Lower urinary tract obstructive syndrome            DISPOSITION/PLAN  PATIENT REFERRED TO:  [facility] EMERGENCY DEPT  [place] [id number] [phone]    return to emergency department if not able to urinate; or any new or worsening symptoms. Simeon Pearce MD  [place], [place] [phone]      call Dr. Sanaz Hdz, Urology, office tomorrow for follow-up    DISCHARGE MEDICATIONS:  New Prescriptions    NYSTATIN-TRIAMCINOLONE (MYCOLOG) 332908-2.1 UNIT/GM-% OINTMENT    Apply topically 2 times daily.          Charlie Tejada MD      Emergency room physician        Charlie Tejada MD  08/01/18 2011
ideas. The patient is not nervous/anxious. PAST MEDICAL HISTORY    has a past medical history of COPD (chronic obstructive pulmonary disease) ([address] and Hypertension. SURGICAL HISTORY      has a past surgical history that includes Coronary angioplasty with stent; back surgery; Neck surgery; and Carotid-subclavian Bypass Graft. CURRENT MEDICATIONS       Previous Medications    ASPIRIN 81 MG TABLET    Take 81 mg by mouth daily    AZITHROMYCIN (ZITHROMAX) 250 MG TABLET    Take 2 tabs po on day one. Take 1 tab po daily on days 2-5. CLOPIDOGREL (PLAVIX) 75 MG TABLET    Take 75 mg by mouth daily    LISINOPRIL (PRINIVIL;ZESTRIL) 20 MG TABLET    Take 20 mg by mouth daily    MORPHINE (MSIR) 15 MG TABLET    Take 15 mg by mouth every 6 hours as needed for Pain    OMEPRAZOLE (PRILOSEC) 20 MG CAPSULE    Take 20 mg by mouth daily    OXYCODONE-ACETAMINOPHEN (PERCOCET) 5-325 MG PER TABLET    Take 1 tablet by mouth every 4 hours as needed for Pain    SENNA-DOCUSATE (PERICOLACE) 8.6-50 MG PER TABLET    Take 1 tablet by mouth 3 times daily    SILDENAFIL (VIAGRA) 100 MG TABLET    Take 100 mg by mouth once a week    SIMVASTATIN (ZOCOR) 80 MG TABLET    Take 80 mg by mouth nightly    TAMSULOSIN (FLOMAX) 0.4 MG CAPSULE    Take 0.4 mg by mouth daily       ALLERGIES     has No Known Allergies. FAMILY HISTORY     has no family status information on file. family history is not on file. SOCIAL HISTORY      reports that he has been smoking Cigarettes. He has a 32.50 pack-year smoking history. He has never used smokeless tobacco. He reports that he drinks alcohol. He reports that he does not use drugs. PHYSICAL EXAM     INITIAL VITALS:  weight is 128 lb (58.1 kg). His oral temperature is 98 °F (36.7 °C). His blood pressure is 122/73 and his pulse is 98. His respiration is 16 and oxygen saturation is 98%. Physical Exam   Constitutional: He is oriented to person, place, and time.  He appears well-developed and

## 2018-08-03 ENCOUNTER — HOSPITAL ENCOUNTER (EMERGENCY)
Age: 68
Discharge: HOME | End: 2018-08-03
Attending: EMERGENCY MEDICINE
Payer: MEDICARE

## 2018-08-03 VITALS
SYSTOLIC BLOOD PRESSURE: 112 MMHG | RESPIRATION RATE: 20 BRPM | TEMPERATURE: 98.3 F | HEART RATE: 62 BPM | DIASTOLIC BLOOD PRESSURE: 62 MMHG | OXYGEN SATURATION: 98 %

## 2018-08-03 DIAGNOSIS — Z78.9 UNCIRCUMCISED MALE: ICD-10-CM

## 2018-08-03 DIAGNOSIS — N48.1 BALANITIS: ICD-10-CM

## 2018-08-03 DIAGNOSIS — R33.9 RETENTION OF URINE: Primary | ICD-10-CM

## 2018-08-03 LAB
BILIRUBIN URINE: NEGATIVE
BLOOD, URINE: NEGATIVE
CHARACTER, URINE: CLEAR
COLOR: YELLOW
GLUCOSE URINE: NEGATIVE MG/DL
KETONES, URINE: NEGATIVE
LEUKOCYTE ESTERASE, URINE: NEGATIVE
NITRITE, URINE: NEGATIVE
ORGANISM: ABNORMAL
PH UA: 5
PROTEIN UA: NEGATIVE
SPECIFIC GRAVITY, URINE: 1.02 (ref 1–1.03)
URINE CULTURE REFLEX: ABNORMAL
UROBILINOGEN, URINE: 0.2 EU/DL

## 2018-08-03 PROCEDURE — 99283 EMERGENCY DEPT VISIT LOW MDM: CPT

## 2018-08-03 PROCEDURE — 81003 URINALYSIS AUTO W/O SCOPE: CPT

## 2018-08-03 PROCEDURE — 51702 INSERT TEMP BLADDER CATH: CPT

## 2018-08-03 RX ORDER — CIPROFLOXACIN 500 MG/1
500 TABLET, FILM COATED ORAL 2 TIMES DAILY
Qty: 14 TABLET | Refills: 0 | Status: SHIPPED | OUTPATIENT
Start: 2018-08-03 | End: 2018-08-10

## 2018-08-03 ASSESSMENT — ENCOUNTER SYMPTOMS
ABDOMINAL DISTENTION: 0
COUGH: 0
EYE DISCHARGE: 0
DIARRHEA: 0
ABDOMINAL PAIN: 0
EYE ITCHING: 0
SHORTNESS OF BREATH: 0
NAUSEA: 0
RHINORRHEA: 0
VOMITING: 0
WHEEZING: 0

## 2018-08-03 ASSESSMENT — PAIN DESCRIPTION - PAIN TYPE: TYPE: ACUTE PAIN

## 2018-08-03 ASSESSMENT — PAIN SCALES - GENERAL: PAINLEVEL_OUTOF10: 5

## 2018-08-03 ASSESSMENT — PAIN DESCRIPTION - DESCRIPTORS: DESCRIPTORS: PRESSURE

## 2018-08-03 ASSESSMENT — PAIN DESCRIPTION - LOCATION: LOCATION: ABDOMEN

## 2018-08-03 ASSESSMENT — PAIN DESCRIPTION - FREQUENCY: FREQUENCY: CONTINUOUS

## 2018-08-04 ENCOUNTER — TELEPHONE (OUTPATIENT)
Dept: PHARMACY | Age: 68
End: 2018-08-04

## 2018-08-04 NOTE — ED PROVIDER NOTES
Carotid-subclavian Bypass Graft. CURRENT MEDICATIONS       Previous Medications    ASPIRIN 81 MG TABLET    Take 81 mg by mouth daily    AZITHROMYCIN (ZITHROMAX) 250 MG TABLET    Take 2 tabs po on day one. Take 1 tab po daily on days 2-5. CLOPIDOGREL (PLAVIX) 75 MG TABLET    Take 75 mg by mouth daily    LISINOPRIL (PRINIVIL;ZESTRIL) 20 MG TABLET    Take 20 mg by mouth daily    MORPHINE (MSIR) 15 MG TABLET    Take 15 mg by mouth every 6 hours as needed for Pain    NYSTATIN-TRIAMCINOLONE (MYCOLOG) 653470-0.1 UNIT/GM-% OINTMENT    Apply topically 2 times daily. OMEPRAZOLE (PRILOSEC) 20 MG CAPSULE    Take 20 mg by mouth daily    OXYCODONE-ACETAMINOPHEN (PERCOCET) 5-325 MG PER TABLET    Take 1 tablet by mouth every 4 hours as needed for Pain    SENNA-DOCUSATE (PERICOLACE) 8.6-50 MG PER TABLET    Take 1 tablet by mouth 3 times daily    SILDENAFIL (VIAGRA) 100 MG TABLET    Take 100 mg by mouth once a week    SIMVASTATIN (ZOCOR) 80 MG TABLET    Take 80 mg by mouth nightly    TAMSULOSIN (FLOMAX) 0.4 MG CAPSULE    Take 0.4 mg by mouth daily       ALLERGIES     has No Known Allergies. FAMILY HISTORY     has no family status information on file. family history is not on file. SOCIAL HISTORY      reports that he has been smoking Cigarettes. He has a 32.50 pack-year smoking history. He has never used smokeless tobacco. He reports that he drinks alcohol. He reports that he does not use drugs. PHYSICAL EXAM     INITIAL VITALS:  vitals were not taken for this visit. Physical Exam   Constitutional: He is oriented to person, place, and time. He appears well-developed and well-nourished. HENT:   Head: Normocephalic and atraumatic. Eyes: Pupils are equal, round, and reactive to light. Right eye exhibits no discharge. Left eye exhibits no discharge. No scleral icterus. Neck: Normal range of motion. Neck supple. No tracheal deviation present.    Cardiovascular: Normal rate, regular rhythm and normal heart sounds. Exam reveals no gallop and no friction rub. No murmur heard. Pulmonary/Chest: Effort normal. No stridor. No respiratory distress. He has no wheezes. Abdominal: Soft. There is no tenderness. There is no rebound and no guarding. Genitourinary:   Genitourinary Comments: Yellow discharge on glans, patient is not circumcised   Musculoskeletal: He exhibits no edema or tenderness. Neurological: He is alert and oriented to person, place, and time. No cranial nerve deficit. Coordination normal.   Skin: Skin is warm and dry. He is not diaphoretic. Psychiatric: He has a normal mood and affect. His behavior is normal. Judgment and thought content normal.   Nursing note and vitals reviewed. DIFFERENTIAL DIAGNOSIS:   BPH, UTI, urine retention    DIAGNOSTIC RESULTS     EKG: All EKG's are interpreted by the Emergency Department Physician who either signs or Co-signs this chart in the absence of a cardiologist.  None    RADIOLOGY: non-plain film images(s) such as CT, Ultrasound and MRI are read by the radiologist.  No results found. No orders to display     [] Visualized and interpreted by me   [] Radiologist's Wet Read Report Reviewed   [] Discussed with Radiologist.    Jackelin Urrutia:   No results found for this visit on 08/03/18. EMERGENCY DEPARTMENT COURSE:   Vitals: There were no vitals filed for this visit. 9:43 PM    Patient is seen and evaluated in a timely fashion. Medical Decision Making    Patient has suprapubic fullness. Ultrasound bedside hows bladder diameter at 9 cm. Dickinson is inserted. Discharged with Cipro. Followed in next 3-5 days by his [address]. He is on Flomax already. He has Bactroban cream at home. CRITICAL CARE:   None    CONSULTS:  None    PROCEDURES:  None    FINAL IMPRESSION      1. Retention of urine    2. Balanitis    3. Uncircumcised male          DISPOSITION/PLAN   Home    PATIENT REFERRED TO:  No follow-up provider specified.     DISCHARGE MEDICATIONS:  New Prescriptions    CIPROFLOXACIN (CIPRO) 500 MG TABLET    Take 1 tablet by mouth 2 times daily for 7 days       (Please note that portions of this note were completed with a voice recognition program.  Efforts were made to edit the dictations but occasionally words are mis-transcribed.)    MD Cameron Ornelas MD  08/03/18 8134

## 2018-08-04 NOTE — ED NOTES
Patient to ED for urinary retention. Patient had key catheter removed two days ago, patient states he hasn't urinated since.      Michael Siddiqui RN  08/03/18 4816       Michael Siddiqui RN  08/03/18 0959

## 2018-08-04 NOTE — TELEPHONE ENCOUNTER
Pharmacy Note  ED Culture Follow-up    Lesvia Bull is a 69 y.o. male. Allergies: Patient has no known allergies. Labs:  Lab Results   Component Value Date    BUN 21 08/01/2018    CREATININE 1.1 08/01/2018    WBC 11.1 (H) 08/01/2018     Estimated Creatinine Clearance: 52 mL/min (based on SCr of 1.1 mg/dL). Current antimicrobials:   · cipro    ASSESSMENT:  Micro results:   Urine culture: positive for staph epidermidis     PLAN:  Need for intervention: Yes  Discussed with: MARGI Vargas  Chosen treatment:    · Patient will be treated by specialist    Patient response:   · Call attempt #1, did not reach patient    Called/sent in prescription to: Not applicable    Please call with any questions.  Cipriano Diallo, PharmD 3:51 PM 8/4/2018

## 2018-08-05 ENCOUNTER — HOSPITAL ENCOUNTER (EMERGENCY)
Age: 68
Discharge: HOME | End: 2018-08-05
Attending: FAMILY MEDICINE
Payer: OTHER GOVERNMENT

## 2018-08-05 VITALS
TEMPERATURE: 98.6 F | DIASTOLIC BLOOD PRESSURE: 81 MMHG | RESPIRATION RATE: 18 BRPM | SYSTOLIC BLOOD PRESSURE: 123 MMHG | HEART RATE: 80 BPM | OXYGEN SATURATION: 98 %

## 2018-08-05 DIAGNOSIS — R31.0 GROSS HEMATURIA: Primary | ICD-10-CM

## 2018-08-05 DIAGNOSIS — Z97.8 FOLEY CATHETER IN PLACE: ICD-10-CM

## 2018-08-05 DIAGNOSIS — T83.9XXA PROBLEM WITH FOLEY CATHETER, INITIAL ENCOUNTER (HCC): ICD-10-CM

## 2018-08-05 LAB
ALBUMIN SERPL-MCNC: 3.8 G/DL (ref 3.5–5.1)
ALP BLD-CCNC: 83 U/L (ref 38–126)
ALT SERPL-CCNC: 8 U/L (ref 11–66)
ANION GAP SERPL CALCULATED.3IONS-SCNC: 13 MEQ/L (ref 8–16)
AST SERPL-CCNC: 12 U/L (ref 5–40)
BACTERIA: ABNORMAL /HPF
BASOPHILS # BLD: 0.5 %
BASOPHILS ABSOLUTE: 0 THOU/MM3 (ref 0–0.1)
BILIRUB SERPL-MCNC: 0.2 MG/DL (ref 0.3–1.2)
BILIRUBIN URINE: ABNORMAL
BLOOD, URINE: ABNORMAL
BUN BLDV-MCNC: 24 MG/DL (ref 7–22)
CALCIUM SERPL-MCNC: 9.3 MG/DL (ref 8.5–10.5)
CASTS 2: ABNORMAL /LPF
CASTS UA: ABNORMAL /LPF
CHARACTER, URINE: ABNORMAL
CHLORIDE BLD-SCNC: 104 MEQ/L (ref 98–111)
CO2: 22 MEQ/L (ref 23–33)
COLOR: ABNORMAL
CREAT SERPL-MCNC: 1 MG/DL (ref 0.4–1.2)
CRYSTALS, UA: ABNORMAL
EOSINOPHIL # BLD: 4.7 %
EOSINOPHILS ABSOLUTE: 0.4 THOU/MM3 (ref 0–0.4)
EPITHELIAL CELLS, UA: ABNORMAL /HPF
ERYTHROCYTE [DISTWIDTH] IN BLOOD BY AUTOMATED COUNT: 14.1 % (ref 11.5–14.5)
ERYTHROCYTE [DISTWIDTH] IN BLOOD BY AUTOMATED COUNT: 45.6 FL (ref 35–45)
GFR SERPL CREATININE-BSD FRML MDRD: 74 ML/MIN/1.73M2
GLUCOSE BLD-MCNC: 115 MG/DL (ref 70–108)
GLUCOSE URINE: NEGATIVE MG/DL
HCT VFR BLD CALC: 32.8 % (ref 42–52)
HEMOGLOBIN: 10.3 GM/DL (ref 14–18)
ICTOTEST: NEGATIVE
IMMATURE GRANS (ABS): 0.04 THOU/MM3 (ref 0–0.07)
IMMATURE GRANULOCYTES: 0.5 %
KETONES, URINE: 15
LEUKOCYTE ESTERASE, URINE: ABNORMAL
LYMPHOCYTES # BLD: 17.6 %
LYMPHOCYTES ABSOLUTE: 1.4 THOU/MM3 (ref 1–4.8)
MCH RBC QN AUTO: 28 PG (ref 26–33)
MCHC RBC AUTO-ENTMCNC: 31.4 GM/DL (ref 32.2–35.5)
MCV RBC AUTO: 89.1 FL (ref 80–94)
MISCELLANEOUS 2: ABNORMAL
MONOCYTES # BLD: 6.2 %
MONOCYTES ABSOLUTE: 0.5 THOU/MM3 (ref 0.4–1.3)
NITRITE, URINE: POSITIVE
NUCLEATED RED BLOOD CELLS: 0 /100 WBC
OSMOLALITY CALCULATION: 282.5 MOSMOL/KG (ref 275–300)
PH UA: 5
PLATELET # BLD: 243 THOU/MM3 (ref 130–400)
PMV BLD AUTO: 9.7 FL (ref 9.4–12.4)
POTASSIUM SERPL-SCNC: 4.2 MEQ/L (ref 3.5–5.2)
PROTEIN UA: 300
RBC # BLD: 3.68 MILL/MM3 (ref 4.7–6.1)
RBC URINE: > 200 /HPF
RENAL EPITHELIAL, UA: ABNORMAL
SEG NEUTROPHILS: 70.5 %
SEGMENTED NEUTROPHILS ABSOLUTE COUNT: 5.7 THOU/MM3 (ref 1.8–7.7)
SODIUM BLD-SCNC: 139 MEQ/L (ref 135–145)
SPECIFIC GRAVITY, URINE: 1.03 (ref 1–1.03)
TOTAL PROTEIN: 6.5 G/DL (ref 6.1–8)
UROBILINOGEN, URINE: 0.2 EU/DL
WBC # BLD: 8.1 THOU/MM3 (ref 4.8–10.8)
WBC UA: ABNORMAL /HPF
YEAST: ABNORMAL

## 2018-08-05 PROCEDURE — 87186 SC STD MICRODIL/AGAR DIL: CPT

## 2018-08-05 PROCEDURE — 85025 COMPLETE CBC W/AUTO DIFF WBC: CPT

## 2018-08-05 PROCEDURE — 36415 COLL VENOUS BLD VENIPUNCTURE: CPT

## 2018-08-05 PROCEDURE — 87086 URINE CULTURE/COLONY COUNT: CPT

## 2018-08-05 PROCEDURE — 87147 CULTURE TYPE IMMUNOLOGIC: CPT

## 2018-08-05 PROCEDURE — 99283 EMERGENCY DEPT VISIT LOW MDM: CPT

## 2018-08-05 PROCEDURE — 80053 COMPREHEN METABOLIC PANEL: CPT

## 2018-08-05 PROCEDURE — 87077 CULTURE AEROBIC IDENTIFY: CPT

## 2018-08-05 PROCEDURE — 51700 IRRIGATION OF BLADDER: CPT

## 2018-08-05 PROCEDURE — 2709999900 HC NON-CHARGEABLE SUPPLY

## 2018-08-05 PROCEDURE — 81001 URINALYSIS AUTO W/SCOPE: CPT

## 2018-08-05 ASSESSMENT — ENCOUNTER SYMPTOMS
NAUSEA: 0
EYE DISCHARGE: 0
SHORTNESS OF BREATH: 0
SORE THROAT: 0
ABDOMINAL PAIN: 0
BACK PAIN: 0
EYE REDNESS: 0
WHEEZING: 0
DIARRHEA: 0
VOMITING: 0
RHINORRHEA: 0
COUGH: 0

## 2018-08-05 NOTE — ED NOTES
Per statement of wife: pt is in the parking lot smoking, stated that he is too impatient and had to go smoke. Discharge instructions given to wife, who verbalized understanding. Per wife's request: pt given extra leg bag and standard key bag for home use.        Marc Pickett RN  08/05/18 6930

## 2018-08-05 NOTE — ED PROVIDER NOTES
[facility]  eMERGENCY dEPARTMENT eNCOUnter          CHIEF COMPLAINT       Chief Complaint   Patient presents with    Hematuria       Nurses Notes reviewed and I agree except as noted in the HPI. HISTORY OF PRESENT ILLNESS    Jie Parrish is a 69 y.o. male who presents to the Emergency Department for the evaluation of hematuria and leakage around catheter insertion site. The patient has a Dickinson catheter which was placed 2 days for urinary retention. The patient states that he had surgery at in June and his \"bladder has not awakened yet\". The patient has a scheduled appointment with urology tomorrow. The patient admits leakage of blood around his cath, in his penis. The patient denies any dysuria, pain, urinary urgency, urinary frequency, Back or abdominal pain or any other signs or symptoms at this time. The HPI was provided by the patient. REVIEW OF SYSTEMS     Review of Systems   Constitutional: Negative for appetite change, chills, fatigue and fever. HENT: Negative for congestion, ear pain, rhinorrhea and sore throat. Eyes: Negative for discharge, redness and visual disturbance. Respiratory: Negative for cough, shortness of breath and wheezing. Cardiovascular: Negative for chest pain, palpitations and leg swelling. Gastrointestinal: Negative for abdominal pain, diarrhea, nausea and vomiting. Genitourinary: Positive for hematuria. Negative for decreased urine volume, difficulty urinating and dysuria. Musculoskeletal: Negative for arthralgias, back pain, joint swelling and neck pain. Skin: Negative for pallor and rash. Allergic/Immunologic: Negative for environmental allergies. Neurological: Negative for dizziness, syncope, weakness, light-headedness, numbness and headaches. Hematological: Negative for adenopathy. Psychiatric/Behavioral: Negative for confusion and suicidal ideas. The patient is not nervous/anxious.         PAST MEDICAL HISTORY    has a past medical history of COPD (chronic obstructive pulmonary disease) ([facility] [address] and Hypertension. SURGICAL HISTORY      has a past surgical history that includes Coronary angioplasty with stent; back surgery; Neck surgery; and Carotid-subclavian Bypass Graft. CURRENT MEDICATIONS       Discharge Medication List as of 8/5/2018  5:58 PM      CONTINUE these medications which have NOT CHANGED    Details   ciprofloxacin (CIPRO) 500 MG tablet Take 1 tablet by mouth 2 times daily for 7 days, Disp-14 tablet, R-0Print      nystatin-triamcinolone (MYCOLOG) 127674-0.1 UNIT/GM-% ointment Apply topically 2 times daily. , Disp-1 Tube, R-0, Print      azithromycin (ZITHROMAX) 250 MG tablet Take 2 tabs po on day one. Take 1 tab po daily on days 2-5., Disp-6 tablet, R-0Print      clopidogrel (PLAVIX) 75 MG tablet Take 75 mg by mouth daily      morphine (MSIR) 15 MG tablet Take 15 mg by mouth every 6 hours as needed for Pain      oxyCODONE-acetaminophen (PERCOCET) 5-325 MG per tablet Take 1 tablet by mouth every 4 hours as needed for Pain      aspirin 81 MG tablet Take 81 mg by mouth daily      tamsulosin (FLOMAX) 0.4 MG capsule Take 0.4 mg by mouth daily      simvastatin (ZOCOR) 80 MG tablet Take 80 mg by mouth nightly      lisinopril (PRINIVIL;ZESTRIL) 20 MG tablet Take 20 mg by mouth daily      senna-docusate (PERICOLACE) 8.6-50 MG per tablet Take 1 tablet by mouth 3 times daily      omeprazole (PRILOSEC) 20 MG capsule Take 20 mg by mouth daily      sildenafil (VIAGRA) 100 MG tablet Take 100 mg by mouth once a week             ALLERGIES     has No Known Allergies. FAMILY HISTORY     has no family status information on file. family history is not on file. SOCIAL HISTORY      reports that he has been smoking Cigarettes. He has a 32.50 pack-year smoking history. He has never used smokeless tobacco. He reports that he drinks alcohol. He reports that he does not use drugs.     PHYSICAL EXAM     INITIAL VITALS:  oral temperature is 98.6 °F (37 °C). His blood pressure is 123/81 and his pulse is 80. His respiration is 18 and oxygen saturation is 98%. Physical Exam   Constitutional: He is oriented to person, place, and time. He appears well-developed and well-nourished. Non-toxic appearance. HENT:   Head: Normocephalic and atraumatic. Right Ear: Tympanic membrane and external ear normal.   Left Ear: Tympanic membrane and external ear normal.   Nose: Nose normal.   Mouth/Throat: Oropharynx is clear and moist and mucous membranes are normal. No oropharyngeal exudate, posterior oropharyngeal edema or posterior oropharyngeal erythema. Eyes: Conjunctivae and EOM are normal.   Neck: Normal range of motion. Neck supple. No JVD present. Cardiovascular: Normal rate, regular rhythm, normal heart sounds, intact distal pulses and normal pulses. Exam reveals no gallop and no friction rub. No murmur heard. Pulmonary/Chest: Effort normal and breath sounds normal. No respiratory distress. He has no decreased breath sounds. He has no wheezes. He has no rhonchi. He has no rales. Abdominal: Soft. Bowel sounds are normal. He exhibits no distension. There is no tenderness. There is no rebound, no guarding and no CVA tenderness. Genitourinary: Circumcised. No discharge found. Genitourinary Comments: Gross hematuria in the key catheter bag, no lesions or masses noted to the genital area. No leakage around the key site. No blood around the urethral meatus. Musculoskeletal: Normal range of motion. He exhibits no edema. Neurological: He is alert and oriented to person, place, and time. He exhibits normal muscle tone. Coordination normal.   Skin: Skin is warm and dry. No rash noted. He is not diaphoretic. Nursing note and vitals reviewed.       DIFFERENTIAL DIAGNOSIS:   Including but not limited to: UTI, key dislodgement, key dysfunction, hematoma, malignancy    DIAGNOSTIC RESULTS     EKG: All EKG's are interpreted by the Domenico Loving MD 8/5/18 10:09 PM          Mirza White MD  08/05/18 3103

## 2018-08-07 ENCOUNTER — NURSE TRIAGE (OUTPATIENT)
Dept: ADMINISTRATIVE | Age: 68
End: 2018-08-07

## 2018-08-08 LAB
ORGANISM: ABNORMAL
ORGANISM: ABNORMAL
URINE CULTURE REFLEX: ABNORMAL
URINE CULTURE REFLEX: ABNORMAL

## 2018-09-02 ENCOUNTER — HOSPITAL ENCOUNTER (EMERGENCY)
Age: 68
Discharge: HOME | End: 2018-09-02
Attending: EMERGENCY MEDICINE
Payer: MEDICARE

## 2018-09-02 VITALS
DIASTOLIC BLOOD PRESSURE: 65 MMHG | WEIGHT: 125 LBS | RESPIRATION RATE: 20 BRPM | OXYGEN SATURATION: 100 % | SYSTOLIC BLOOD PRESSURE: 119 MMHG | BODY MASS INDEX: 18.94 KG/M2 | HEART RATE: 109 BPM | TEMPERATURE: 98.2 F | HEIGHT: 68 IN

## 2018-09-02 DIAGNOSIS — N39.0 URINARY TRACT INFECTION WITHOUT HEMATURIA, SITE UNSPECIFIED: Primary | ICD-10-CM

## 2018-09-02 DIAGNOSIS — R33.8 ACUTE URINARY RETENTION: ICD-10-CM

## 2018-09-02 LAB
ALBUMIN SERPL-MCNC: 3.8 G/DL (ref 3.5–5.1)
ALP BLD-CCNC: 88 U/L (ref 38–126)
ALT SERPL-CCNC: 7 U/L (ref 11–66)
AMPHETAMINE+METHAMPHETAMINE URINE SCREEN: NEGATIVE
ANION GAP SERPL CALCULATED.3IONS-SCNC: 14 MEQ/L (ref 8–16)
AST SERPL-CCNC: 9 U/L (ref 5–40)
BACTERIA: ABNORMAL /HPF
BARBITURATE QUANTITATIVE URINE: NEGATIVE
BASOPHILS # BLD: 0.5 %
BASOPHILS ABSOLUTE: 0.1 THOU/MM3 (ref 0–0.1)
BENZODIAZEPINE QUANTITATIVE URINE: NEGATIVE
BILIRUB SERPL-MCNC: 0.2 MG/DL (ref 0.3–1.2)
BILIRUBIN DIRECT: < 0.2 MG/DL (ref 0–0.3)
BILIRUBIN URINE: NEGATIVE
BLOOD, URINE: NEGATIVE
BUN BLDV-MCNC: 19 MG/DL (ref 7–22)
CALCIUM SERPL-MCNC: 9.3 MG/DL (ref 8.5–10.5)
CANNABINOID QUANTITATIVE URINE: NEGATIVE
CASTS 2: ABNORMAL /LPF
CASTS UA: ABNORMAL /LPF
CHARACTER, URINE: ABNORMAL
CHLORIDE BLD-SCNC: 103 MEQ/L (ref 98–111)
CO2: 23 MEQ/L (ref 23–33)
COCAINE METABOLITE QUANTITATIVE URINE: NEGATIVE
COLOR: YELLOW
CREAT SERPL-MCNC: 1 MG/DL (ref 0.4–1.2)
CRYSTALS, UA: ABNORMAL
EOSINOPHIL # BLD: 3.2 %
EOSINOPHILS ABSOLUTE: 0.3 THOU/MM3 (ref 0–0.4)
EPITHELIAL CELLS, UA: ABNORMAL /HPF
ERYTHROCYTE [DISTWIDTH] IN BLOOD BY AUTOMATED COUNT: 15.3 % (ref 11.5–14.5)
ERYTHROCYTE [DISTWIDTH] IN BLOOD BY AUTOMATED COUNT: 47.2 FL (ref 35–45)
GFR SERPL CREATININE-BSD FRML MDRD: 74 ML/MIN/1.73M2
GLUCOSE BLD-MCNC: 120 MG/DL (ref 70–108)
GLUCOSE URINE: NEGATIVE MG/DL
HCT VFR BLD CALC: 34 % (ref 42–52)
HEMOGLOBIN: 10.7 GM/DL (ref 14–18)
IMMATURE GRANS (ABS): 0.09 THOU/MM3 (ref 0–0.07)
IMMATURE GRANULOCYTES: 0.8 %
KETONES, URINE: NEGATIVE
LEUKOCYTE ESTERASE, URINE: ABNORMAL
LIPASE: 77 U/L (ref 5.6–51.3)
LYMPHOCYTES # BLD: 12.7 %
LYMPHOCYTES ABSOLUTE: 1.3 THOU/MM3 (ref 1–4.8)
MAGNESIUM: 1.8 MG/DL (ref 1.6–2.4)
MCH RBC QN AUTO: 26.8 PG (ref 26–33)
MCHC RBC AUTO-ENTMCNC: 31.5 GM/DL (ref 32.2–35.5)
MCV RBC AUTO: 85.2 FL (ref 80–94)
MISCELLANEOUS 2: ABNORMAL
MONOCYTES # BLD: 5 %
MONOCYTES ABSOLUTE: 0.5 THOU/MM3 (ref 0.4–1.3)
NITRITE, URINE: NEGATIVE
NUCLEATED RED BLOOD CELLS: 0 /100 WBC
OPIATES, URINE: NEGATIVE
OSMOLALITY CALCULATION: 282.9 MOSMOL/KG (ref 275–300)
OXYCODONE: NEGATIVE
PH UA: 5
PHENCYCLIDINE QUANTITATIVE URINE: NEGATIVE
PLATELET # BLD: 361 THOU/MM3 (ref 130–400)
PMV BLD AUTO: 8.9 FL (ref 9.4–12.4)
POTASSIUM SERPL-SCNC: 3.8 MEQ/L (ref 3.5–5.2)
PROSTATE SPECIFIC ANTIGEN: 9.17 NG/ML (ref 0–1)
PROTEIN UA: NEGATIVE
RBC # BLD: 3.99 MILL/MM3 (ref 4.7–6.1)
RBC URINE: ABNORMAL /HPF
RENAL EPITHELIAL, UA: ABNORMAL
SEG NEUTROPHILS: 77.8 %
SEGMENTED NEUTROPHILS ABSOLUTE COUNT: 8.2 THOU/MM3 (ref 1.8–7.7)
SODIUM BLD-SCNC: 140 MEQ/L (ref 135–145)
SPECIFIC GRAVITY, URINE: 1.01 (ref 1–1.03)
TOTAL PROTEIN: 6.9 G/DL (ref 6.1–8)
UROBILINOGEN, URINE: 0.2 EU/DL
WBC # BLD: 10.6 THOU/MM3 (ref 4.8–10.8)
WBC UA: ABNORMAL /HPF
YEAST: ABNORMAL

## 2018-09-02 PROCEDURE — G0103 PSA SCREENING: HCPCS

## 2018-09-02 PROCEDURE — 80053 COMPREHEN METABOLIC PANEL: CPT

## 2018-09-02 PROCEDURE — 82248 BILIRUBIN DIRECT: CPT

## 2018-09-02 PROCEDURE — 36415 COLL VENOUS BLD VENIPUNCTURE: CPT

## 2018-09-02 PROCEDURE — 81001 URINALYSIS AUTO W/SCOPE: CPT

## 2018-09-02 PROCEDURE — 80307 DRUG TEST PRSMV CHEM ANLYZR: CPT

## 2018-09-02 PROCEDURE — 51702 INSERT TEMP BLADDER CATH: CPT

## 2018-09-02 PROCEDURE — 83690 ASSAY OF LIPASE: CPT

## 2018-09-02 PROCEDURE — 2709999900 HC NON-CHARGEABLE SUPPLY

## 2018-09-02 PROCEDURE — 99284 EMERGENCY DEPT VISIT MOD MDM: CPT

## 2018-09-02 PROCEDURE — 83735 ASSAY OF MAGNESIUM: CPT

## 2018-09-02 PROCEDURE — 87086 URINE CULTURE/COLONY COUNT: CPT

## 2018-09-02 PROCEDURE — 87106 FUNGI IDENTIFICATION YEAST: CPT

## 2018-09-02 PROCEDURE — 85025 COMPLETE CBC W/AUTO DIFF WBC: CPT

## 2018-09-02 RX ORDER — SULFAMETHOXAZOLE AND TRIMETHOPRIM 800; 160 MG/1; MG/1
1 TABLET ORAL 2 TIMES DAILY
Qty: 20 TABLET | Refills: 0 | Status: SHIPPED | OUTPATIENT
Start: 2018-09-02 | End: 2018-09-12

## 2018-09-02 ASSESSMENT — PAIN DESCRIPTION - ONSET: ONSET: ON-GOING

## 2018-09-02 ASSESSMENT — ENCOUNTER SYMPTOMS
NAUSEA: 0
PHOTOPHOBIA: 0
CHEST TIGHTNESS: 0
CONSTIPATION: 0
SINUS PRESSURE: 0
EYE REDNESS: 0
TROUBLE SWALLOWING: 0
ABDOMINAL PAIN: 1
VOICE CHANGE: 0
CHOKING: 0
WHEEZING: 0
EYE DISCHARGE: 0
SORE THROAT: 0
EYE ITCHING: 0
RHINORRHEA: 0
EYE PAIN: 0
COUGH: 0
ABDOMINAL DISTENTION: 1
SHORTNESS OF BREATH: 0
DIARRHEA: 0
VOMITING: 0
BACK PAIN: 0
BLOOD IN STOOL: 0

## 2018-09-02 ASSESSMENT — PAIN DESCRIPTION - PAIN TYPE: TYPE: CHRONIC PAIN

## 2018-09-02 ASSESSMENT — PAIN DESCRIPTION - DESCRIPTORS: DESCRIPTORS: ACHING;BURNING

## 2018-09-02 ASSESSMENT — PAIN DESCRIPTION - FREQUENCY: FREQUENCY: CONTINUOUS

## 2018-09-02 ASSESSMENT — PAIN SCALES - GENERAL: PAINLEVEL_OUTOF10: 7

## 2018-09-02 ASSESSMENT — PAIN DESCRIPTION - PROGRESSION: CLINICAL_PROGRESSION: NOT CHANGED

## 2018-09-02 ASSESSMENT — PAIN DESCRIPTION - LOCATION: LOCATION: GENERALIZED

## 2018-09-02 ASSESSMENT — PAIN DESCRIPTION - ORIENTATION: ORIENTATION: LEFT

## 2018-09-03 NOTE — ED PROVIDER NOTES
Negative for immunocompromised state. Neurological: Negative for dizziness, tremors, seizures, syncope, facial asymmetry, weakness, light-headedness, numbness and headaches. Hematological: Negative for adenopathy. Does not bruise/bleed easily. Psychiatric/Behavioral: Negative for agitation, hallucinations and suicidal ideas. The patient is not nervous/anxious. PAST MEDICAL HISTORY    has a past medical history of COPD (chronic obstructive pulmonary disease) ([address] and Hypertension. SURGICAL HISTORY      has a past surgical history that includes Coronary angioplasty with stent; back surgery; Neck surgery; and Carotid-subclavian Bypass Graft. CURRENT MEDICATIONS       Previous Medications    ASPIRIN 81 MG TABLET    Take 81 mg by mouth daily    AZITHROMYCIN (ZITHROMAX) 250 MG TABLET    Take 2 tabs po on day one. Take 1 tab po daily on days 2-5. CLOPIDOGREL (PLAVIX) 75 MG TABLET    Take 75 mg by mouth daily    LISINOPRIL (PRINIVIL;ZESTRIL) 20 MG TABLET    Take 20 mg by mouth daily    MORPHINE (MSIR) 15 MG TABLET    Take 15 mg by mouth every 6 hours as needed for Pain    NYSTATIN-TRIAMCINOLONE (MYCOLOG) 488038-9.1 UNIT/GM-% OINTMENT    Apply topically 2 times daily. OMEPRAZOLE (PRILOSEC) 20 MG CAPSULE    Take 20 mg by mouth daily    OXYCODONE-ACETAMINOPHEN (PERCOCET) 5-325 MG PER TABLET    Take 1 tablet by mouth every 4 hours as needed for Pain    SENNA-DOCUSATE (PERICOLACE) 8.6-50 MG PER TABLET    Take 1 tablet by mouth 3 times daily    SILDENAFIL (VIAGRA) 100 MG TABLET    Take 100 mg by mouth once a week    SIMVASTATIN (ZOCOR) 80 MG TABLET    Take 80 mg by mouth nightly    TAMSULOSIN (FLOMAX) 0.4 MG CAPSULE    Take 0.4 mg by mouth daily       ALLERGIES     has No Known Allergies. FAMILY HISTORY     has no family status information on file. family history is not on file. SOCIAL HISTORY      reports that he has been smoking Cigarettes. He has a 32.50 pack-year smoking history.  He has Oral     SpO2: 98% 100% 100%   Weight: 125 lb (56.7 kg)     Height: 5' 8\" (1.727 m)       9:05 PM: The patient was seen and evaluated. Appropriate labs were ordered and reviewed. Patient has a long history of urinary retention. He has had several full catheters placed. He is not having any hematuria today. He is having difficulty urinating. A Dickinson catheter was placed. 1800 mils of urine were released. Patient is to have the Dickinson catheter In place until cleared by urology. Patient is instructed to follow-up with urology and call on Monday for an appointment. Patient also has evidence of urinary tract infection he will be given antibiotics for this and he is got plenty of home medication he is instructed use this as needed. I discussed this with the patient and family who understood and agreed with the plan. Patient is subsequently discharged home with a Dickinson bag in place, and instructions for follow-up. Patient is discharged in good condition. The patient has acute urinary retention most likely secondary to his prostate hypertrophy. He also has urinary tract infection. Patient has had a Dickinson placed he is instructed to leave this until it is removed by urology. Patient is instructed to follow-up with urology and call in 1-2 days for an appointment. Patient is instructed to take the antibiotics as prescribed. Patient is instructed to take his current pain medication for his pain. Patient is instructed return to the nearest emergency room immediately for any new or worsening complaints. CRITICAL CARE:   None    CONSULTS:  None    PROCEDURES:  None    FINAL IMPRESSION      1. Urinary tract infection without hematuria, site unspecified    2.  Acute urinary retention          DISPOSITION/PLAN   Discharge    PATIENT REFERRED TO:  Marianne Ac, Madelaine N E Jovan [facility]  [address] [phone]    Call in 2 days      [address] [place] [address]  [phone]    Call in 1 day        DISCHARGE MEDICATIONS:  New Prescriptions    SULFAMETHOXAZOLE-TRIMETHOPRIM (BACTRIM DS) 800-160 MG PER TABLET    Take 1 tablet by mouth 2 times daily for 10 days       (Please note that portions of this note were completed with a voice recognition program.  Efforts were made to edit the dictations but occasionally words are mis-transcribed.)    Scribe:  Lisa Cadet 9/2/18 9:05 PM Scribing for and in the presence of Callum Han DO. Scribe: Lisa Cadet 9/2/18 9:05 PM    Provider:  I personally performed the services described in the documentation, reviewed and edited the documentation which was dictated to the scribe in my presence, and it accurately records my words and actions.     Callum Han DO 9/2/18 10:41 PM        Callum Han DO  09/02/18 1865

## 2018-09-06 LAB
ORGANISM: ABNORMAL
URINE CULTURE REFLEX: ABNORMAL
URINE CULTURE REFLEX: ABNORMAL

## 2021-08-31 ENCOUNTER — APPOINTMENT (OUTPATIENT)
Dept: GENERAL RADIOLOGY | Age: 71
DRG: 811 | End: 2021-08-31
Payer: OTHER GOVERNMENT

## 2021-08-31 ENCOUNTER — HOSPITAL ENCOUNTER (INPATIENT)
Age: 71
LOS: 4 days | Discharge: HOME HEALTH | DRG: 811 | End: 2021-09-04
Attending: EMERGENCY MEDICINE | Admitting: INTERNAL MEDICINE
Payer: OTHER GOVERNMENT

## 2021-08-31 DIAGNOSIS — I95.9 HYPOTENSION, UNSPECIFIED HYPOTENSION TYPE: ICD-10-CM

## 2021-08-31 DIAGNOSIS — D50.0 IRON DEFICIENCY ANEMIA DUE TO CHRONIC BLOOD LOSS: ICD-10-CM

## 2021-08-31 DIAGNOSIS — D64.9 ANEMIA, UNSPECIFIED TYPE: Primary | ICD-10-CM

## 2021-08-31 LAB
ABO: NORMAL
ALBUMIN SERPL-MCNC: 3.7 G/DL (ref 3.5–5.1)
ALP BLD-CCNC: 77 U/L (ref 38–126)
ALT SERPL-CCNC: < 5 U/L (ref 11–66)
ANION GAP SERPL CALCULATED.3IONS-SCNC: 12 MEQ/L (ref 8–16)
ANTIBODY SCREEN: NORMAL
APTT: 24.4 SECONDS (ref 22–38)
AST SERPL-CCNC: 11 U/L (ref 5–40)
BILIRUB SERPL-MCNC: 0.4 MG/DL (ref 0.3–1.2)
BILIRUBIN DIRECT: < 0.2 MG/DL (ref 0–0.3)
BUN BLDV-MCNC: 16 MG/DL (ref 7–22)
CALCIUM SERPL-MCNC: 8.6 MG/DL (ref 8.5–10.5)
CHLORIDE BLD-SCNC: 100 MEQ/L (ref 98–111)
CO2: 25 MEQ/L (ref 23–33)
CREAT SERPL-MCNC: 1.1 MG/DL (ref 0.4–1.2)
EKG ATRIAL RATE: 69 BPM
EKG P AXIS: 61 DEGREES
EKG P-R INTERVAL: 122 MS
EKG Q-T INTERVAL: 386 MS
EKG QRS DURATION: 72 MS
EKG QTC CALCULATION (BAZETT): 413 MS
EKG R AXIS: 73 DEGREES
EKG T AXIS: 76 DEGREES
EKG VENTRICULAR RATE: 69 BPM
GFR SERPL CREATININE-BSD FRML MDRD: 66 ML/MIN/1.73M2
GLUCOSE BLD-MCNC: 123 MG/DL (ref 70–108)
HCT VFR BLD CALC: 28.6 % (ref 42–52)
HEMOGLOBIN: 7.5 GM/DL (ref 14–18)
INR BLD: 1.03 (ref 0.85–1.13)
LACTIC ACID, SEPSIS: 2.8 MMOL/L (ref 0.5–1.9)
LACTIC ACID, SEPSIS: 3.2 MMOL/L (ref 0.5–1.9)
OSMOLALITY CALCULATION: 276.4 MOSMOL/KG (ref 275–300)
POTASSIUM SERPL-SCNC: 4.3 MEQ/L (ref 3.5–5.2)
PRO-BNP: 334.5 PG/ML (ref 0–900)
RH FACTOR: NORMAL
SCAN OF BLOOD SMEAR: NORMAL
SODIUM BLD-SCNC: 137 MEQ/L (ref 135–145)
TOTAL PROTEIN: 5.8 G/DL (ref 6.1–8)
TROPONIN T: < 0.01 NG/ML

## 2021-08-31 PROCEDURE — 99223 1ST HOSP IP/OBS HIGH 75: CPT | Performed by: INTERNAL MEDICINE

## 2021-08-31 PROCEDURE — 87081 CULTURE SCREEN ONLY: CPT

## 2021-08-31 PROCEDURE — 87500 VANOMYCIN DNA AMP PROBE: CPT

## 2021-08-31 PROCEDURE — 82607 VITAMIN B-12: CPT

## 2021-08-31 PROCEDURE — 86923 COMPATIBILITY TEST ELECTRIC: CPT

## 2021-08-31 PROCEDURE — 83880 ASSAY OF NATRIURETIC PEPTIDE: CPT

## 2021-08-31 PROCEDURE — 93010 ELECTROCARDIOGRAM REPORT: CPT | Performed by: NUCLEAR MEDICINE

## 2021-08-31 PROCEDURE — 87641 MR-STAPH DNA AMP PROBE: CPT

## 2021-08-31 PROCEDURE — 2580000003 HC RX 258: Performed by: EMERGENCY MEDICINE

## 2021-08-31 PROCEDURE — 84153 ASSAY OF PSA TOTAL: CPT

## 2021-08-31 PROCEDURE — 82728 ASSAY OF FERRITIN: CPT

## 2021-08-31 PROCEDURE — P9016 RBC LEUKOCYTES REDUCED: HCPCS

## 2021-08-31 PROCEDURE — 71045 X-RAY EXAM CHEST 1 VIEW: CPT

## 2021-08-31 PROCEDURE — 85025 COMPLETE CBC W/AUTO DIFF WBC: CPT

## 2021-08-31 PROCEDURE — 86901 BLOOD TYPING SEROLOGIC RH(D): CPT

## 2021-08-31 PROCEDURE — 84466 ASSAY OF TRANSFERRIN: CPT

## 2021-08-31 PROCEDURE — 93005 ELECTROCARDIOGRAM TRACING: CPT | Performed by: EMERGENCY MEDICINE

## 2021-08-31 PROCEDURE — 84484 ASSAY OF TROPONIN QUANT: CPT

## 2021-08-31 PROCEDURE — 82746 ASSAY OF FOLIC ACID SERUM: CPT

## 2021-08-31 PROCEDURE — 82248 BILIRUBIN DIRECT: CPT

## 2021-08-31 PROCEDURE — 85730 THROMBOPLASTIN TIME PARTIAL: CPT

## 2021-08-31 PROCEDURE — 80053 COMPREHEN METABOLIC PANEL: CPT

## 2021-08-31 PROCEDURE — 85014 HEMATOCRIT: CPT

## 2021-08-31 PROCEDURE — 85610 PROTHROMBIN TIME: CPT

## 2021-08-31 PROCEDURE — 86900 BLOOD TYPING SEROLOGIC ABO: CPT

## 2021-08-31 PROCEDURE — 96360 HYDRATION IV INFUSION INIT: CPT

## 2021-08-31 PROCEDURE — 2580000003 HC RX 258: Performed by: INTERNAL MEDICINE

## 2021-08-31 PROCEDURE — 2060000000 HC ICU INTERMEDIATE R&B

## 2021-08-31 PROCEDURE — 86850 RBC ANTIBODY SCREEN: CPT

## 2021-08-31 PROCEDURE — 83605 ASSAY OF LACTIC ACID: CPT

## 2021-08-31 PROCEDURE — 83540 ASSAY OF IRON: CPT

## 2021-08-31 PROCEDURE — 36430 TRANSFUSION BLD/BLD COMPNT: CPT

## 2021-08-31 PROCEDURE — 36415 COLL VENOUS BLD VENIPUNCTURE: CPT

## 2021-08-31 PROCEDURE — 99285 EMERGENCY DEPT VISIT HI MDM: CPT

## 2021-08-31 PROCEDURE — 85018 HEMOGLOBIN: CPT

## 2021-08-31 RX ORDER — SODIUM CHLORIDE, SODIUM LACTATE, POTASSIUM CHLORIDE, CALCIUM CHLORIDE 600; 310; 30; 20 MG/100ML; MG/100ML; MG/100ML; MG/100ML
INJECTION, SOLUTION INTRAVENOUS CONTINUOUS
Status: DISCONTINUED | OUTPATIENT
Start: 2021-08-31 | End: 2021-09-02

## 2021-08-31 RX ORDER — ACETAMINOPHEN 325 MG/1
650 TABLET ORAL EVERY 6 HOURS PRN
Status: DISCONTINUED | OUTPATIENT
Start: 2021-08-31 | End: 2021-09-04 | Stop reason: HOSPADM

## 2021-08-31 RX ORDER — SODIUM CHLORIDE 0.9 % (FLUSH) 0.9 %
10 SYRINGE (ML) INJECTION PRN
Status: DISCONTINUED | OUTPATIENT
Start: 2021-08-31 | End: 2021-09-04 | Stop reason: HOSPADM

## 2021-08-31 RX ORDER — IPRATROPIUM BROMIDE AND ALBUTEROL SULFATE 2.5; .5 MG/3ML; MG/3ML
1 SOLUTION RESPIRATORY (INHALATION) EVERY 4 HOURS PRN
Status: DISCONTINUED | OUTPATIENT
Start: 2021-08-31 | End: 2021-09-04

## 2021-08-31 RX ORDER — ACETAMINOPHEN 650 MG/1
650 SUPPOSITORY RECTAL EVERY 6 HOURS PRN
Status: DISCONTINUED | OUTPATIENT
Start: 2021-08-31 | End: 2021-09-04 | Stop reason: HOSPADM

## 2021-08-31 RX ORDER — ONDANSETRON 4 MG/1
4 TABLET, ORALLY DISINTEGRATING ORAL EVERY 8 HOURS PRN
Status: DISCONTINUED | OUTPATIENT
Start: 2021-08-31 | End: 2021-09-04 | Stop reason: HOSPADM

## 2021-08-31 RX ORDER — PANTOPRAZOLE SODIUM 40 MG/1
40 TABLET, DELAYED RELEASE ORAL
Status: DISCONTINUED | OUTPATIENT
Start: 2021-09-01 | End: 2021-09-01

## 2021-08-31 RX ORDER — ONDANSETRON 2 MG/ML
4 INJECTION INTRAMUSCULAR; INTRAVENOUS EVERY 6 HOURS PRN
Status: DISCONTINUED | OUTPATIENT
Start: 2021-08-31 | End: 2021-09-04 | Stop reason: HOSPADM

## 2021-08-31 RX ORDER — METOPROLOL SUCCINATE 25 MG/1
25 TABLET, EXTENDED RELEASE ORAL DAILY
Status: DISCONTINUED | OUTPATIENT
Start: 2021-09-01 | End: 2021-09-01

## 2021-08-31 RX ORDER — SODIUM CHLORIDE 0.9 % (FLUSH) 0.9 %
10 SYRINGE (ML) INJECTION EVERY 12 HOURS SCHEDULED
Status: DISCONTINUED | OUTPATIENT
Start: 2021-08-31 | End: 2021-09-04 | Stop reason: HOSPADM

## 2021-08-31 RX ORDER — SENNA AND DOCUSATE SODIUM 50; 8.6 MG/1; MG/1
1 TABLET, FILM COATED ORAL 3 TIMES DAILY
Status: DISCONTINUED | OUTPATIENT
Start: 2021-08-31 | End: 2021-09-01

## 2021-08-31 RX ORDER — FERROUS SULFATE 325(65) MG
325 TABLET ORAL
COMMUNITY

## 2021-08-31 RX ORDER — MORPHINE SULFATE 15 MG/1
15 TABLET ORAL EVERY 6 HOURS PRN
Status: DISCONTINUED | OUTPATIENT
Start: 2021-08-31 | End: 2021-08-31

## 2021-08-31 RX ORDER — NICOTINE 21 MG/24HR
1 PATCH, TRANSDERMAL 24 HOURS TRANSDERMAL DAILY
Status: DISCONTINUED | OUTPATIENT
Start: 2021-09-01 | End: 2021-09-04 | Stop reason: HOSPADM

## 2021-08-31 RX ORDER — 0.9 % SODIUM CHLORIDE 0.9 %
500 INTRAVENOUS SOLUTION INTRAVENOUS ONCE
Status: COMPLETED | OUTPATIENT
Start: 2021-08-31 | End: 2021-08-31

## 2021-08-31 RX ORDER — SODIUM CHLORIDE 9 MG/ML
25 INJECTION, SOLUTION INTRAVENOUS PRN
Status: DISCONTINUED | OUTPATIENT
Start: 2021-08-31 | End: 2021-09-04 | Stop reason: HOSPADM

## 2021-08-31 RX ORDER — TAMSULOSIN HYDROCHLORIDE 0.4 MG/1
0.4 CAPSULE ORAL DAILY
Status: DISCONTINUED | OUTPATIENT
Start: 2021-09-01 | End: 2021-09-04 | Stop reason: HOSPADM

## 2021-08-31 RX ORDER — POLYETHYLENE GLYCOL 3350 17 G/17G
17 POWDER, FOR SOLUTION ORAL DAILY PRN
Status: DISCONTINUED | OUTPATIENT
Start: 2021-08-31 | End: 2021-09-04 | Stop reason: HOSPADM

## 2021-08-31 RX ORDER — SODIUM CHLORIDE 9 MG/ML
INJECTION, SOLUTION INTRAVENOUS PRN
Status: DISCONTINUED | OUTPATIENT
Start: 2021-08-31 | End: 2021-09-04 | Stop reason: HOSPADM

## 2021-08-31 RX ORDER — METOPROLOL SUCCINATE 25 MG/1
25 TABLET, EXTENDED RELEASE ORAL DAILY
COMMUNITY

## 2021-08-31 RX ORDER — SIMVASTATIN 40 MG
80 TABLET ORAL NIGHTLY
Status: DISCONTINUED | OUTPATIENT
Start: 2021-08-31 | End: 2021-08-31 | Stop reason: CLARIF

## 2021-08-31 RX ORDER — MORPHINE SULFATE 15 MG/1
15 TABLET ORAL EVERY 6 HOURS PRN
Status: DISCONTINUED | OUTPATIENT
Start: 2021-08-31 | End: 2021-09-04 | Stop reason: HOSPADM

## 2021-08-31 RX ORDER — ATORVASTATIN CALCIUM 40 MG/1
40 TABLET, FILM COATED ORAL NIGHTLY
Status: DISCONTINUED | OUTPATIENT
Start: 2021-09-01 | End: 2021-09-04 | Stop reason: HOSPADM

## 2021-08-31 RX ORDER — OXYCODONE HYDROCHLORIDE AND ACETAMINOPHEN 5; 325 MG/1; MG/1
1 TABLET ORAL EVERY 4 HOURS PRN
Status: DISCONTINUED | OUTPATIENT
Start: 2021-08-31 | End: 2021-09-04 | Stop reason: HOSPADM

## 2021-08-31 RX ADMIN — SODIUM CHLORIDE, POTASSIUM CHLORIDE, SODIUM LACTATE AND CALCIUM CHLORIDE: 600; 310; 30; 20 INJECTION, SOLUTION INTRAVENOUS at 23:39

## 2021-08-31 RX ADMIN — SODIUM CHLORIDE, PRESERVATIVE FREE 10 ML: 5 INJECTION INTRAVENOUS at 23:39

## 2021-08-31 RX ADMIN — SODIUM CHLORIDE 500 ML: 9 INJECTION, SOLUTION INTRAVENOUS at 15:55

## 2021-08-31 ASSESSMENT — ENCOUNTER SYMPTOMS
SORE THROAT: 0
VOICE CHANGE: 0
SHORTNESS OF BREATH: 0
CHEST TIGHTNESS: 0
ABDOMINAL DISTENTION: 0
RHINORRHEA: 0
RECTAL PAIN: 0
DIARRHEA: 0
APNEA: 0
EYE REDNESS: 0
ANAL BLEEDING: 0
SINUS PAIN: 0
EYE ITCHING: 0
CHOKING: 0
CONSTIPATION: 0
FACIAL SWELLING: 0
VOMITING: 0
WHEEZING: 0
BACK PAIN: 0
TROUBLE SWALLOWING: 0
ALLERGIC/IMMUNOLOGIC NEGATIVE: 1
ABDOMINAL PAIN: 0
BLOOD IN STOOL: 0
NAUSEA: 0
STRIDOR: 0
SINUS PRESSURE: 0
COLOR CHANGE: 0
EYES NEGATIVE: 1
EYE PAIN: 0
COUGH: 0
PHOTOPHOBIA: 0
EYE DISCHARGE: 0

## 2021-08-31 ASSESSMENT — PAIN DESCRIPTION - FREQUENCY: FREQUENCY: INTERMITTENT

## 2021-08-31 ASSESSMENT — PAIN DESCRIPTION - ONSET: ONSET: ON-GOING

## 2021-08-31 ASSESSMENT — PAIN DESCRIPTION - DESCRIPTORS: DESCRIPTORS: BURNING;THROBBING

## 2021-08-31 ASSESSMENT — PAIN SCALES - GENERAL
PAINLEVEL_OUTOF10: 0
PAINLEVEL_OUTOF10: 7

## 2021-08-31 ASSESSMENT — PAIN DESCRIPTION - PAIN TYPE: TYPE: ACUTE PAIN

## 2021-08-31 ASSESSMENT — PAIN DESCRIPTION - LOCATION: LOCATION: GENERALIZED

## 2021-08-31 ASSESSMENT — PAIN - FUNCTIONAL ASSESSMENT: PAIN_FUNCTIONAL_ASSESSMENT: PREVENTS OR INTERFERES SOME ACTIVE ACTIVITIES AND ADLS

## 2021-08-31 NOTE — ED NOTES
Assumed care at this time. Received bedside shift report from [facility]. Pt and vs assessed. rr even and unlabored. No distress noted. Pt resting in bed alert. Pt denies any needs at this time.  Will continue to monitor     Eric Langford RN  08/31/21 9772

## 2021-08-31 NOTE — LETTER
[facility]LE[facility]  [address] [facility] [address]  Phone: [phone]             September 2, 2021    Patient: Kellie Maddox   YOB: 1950   Date of Visit: 8/31/2021       To Whom It May Concern:    Please excuse Nuris Lee from work 9/2/2021 as he was participating in the care of his father as listed above.       Sincerely,       Lucian Estrada RN         Signature:__________________________________

## 2021-08-31 NOTE — ED NOTES
Patient resting in semi fowlers position, breathing easy and unlabored. Patient watching television and on cellular device. VSS. No new concerns voiced at this time.       Jill Maldonado RN  08/31/21 4729

## 2021-08-31 NOTE — ED PROVIDER NOTES

## 2021-08-31 NOTE — ED PROVIDER NOTES
[address] [address]          Pt Name: Imtiaz Duncan  MRN: [id number]  [place] 1950  Date of evaluation: 8/31/2021  Treating Resident Physician: Christen Pan DO  Supervising Physician: Dr. Clinton Almeida       Chief Complaint   Patient presents with    Hypotension    Fatigue     History obtained from chart review and the patient. HISTORY OF PRESENT ILLNESS    HPI  Imtiaz Duncan is a 66 y.o. male who presents to the emergency department for evaluation of hypotension and anemia. Per report it was noted 1 month ago on routine lab work the patient's TONIE globin was around 5. Patient was counseled on blood transfusions but denied at the time. He had routine doctor's appointment at the [place] today and it was noted that his blood pressure was extremely low and he was advised to come to the ED. States that he currently feels tired all the time. Denies any current pain. The patient has no other acute complaints at this time. REVIEW OF SYSTEMS   Review of Systems   Constitutional: Positive for fatigue. HENT: Negative. Eyes: Negative. Respiratory: Negative for cough, shortness of breath and wheezing. Cardiovascular: Negative for chest pain, palpitations and leg swelling. Gastrointestinal: Negative for abdominal pain, constipation, diarrhea, nausea and vomiting. Endocrine: Negative. Genitourinary: Negative. Musculoskeletal: Negative. Skin: Negative. Allergic/Immunologic: Negative. Neurological: Negative for seizures, speech difficulty, weakness and numbness. Hematological: Negative. Psychiatric/Behavioral: Negative.           PAST MEDICAL AND SURGICAL HISTORY     Past Medical History:   Diagnosis Date    COPD (chronic obstructive pulmonary disease) [address] [address] [facility]     Hypertension      Past Surgical History:   Procedure Laterality Date    BACK SURGERY      CAROTID-SUBCLAVIAN BYPASS GRAFT      CORONARY ANGIOPLASTY WITH STENT PLACEMENT      NECK SURGERY      carotid endarterectomy         MEDICATIONS     Current Facility-Administered Medications:     0.9 % sodium chloride infusion, , IntraVENous, PRN, Sophy De Dios,     Current Outpatient Medications:     nystatin-triamcinolone (MYCOLOG) 850060-7.1 UNIT/GM-% ointment, Apply topically 2 times daily. , Disp: 1 Tube, Rfl: 0    azithromycin (ZITHROMAX) 250 MG tablet, Take 2 tabs po on day one. Take 1 tab po daily on days 2-5., Disp: 6 tablet, Rfl: 0    clopidogrel (PLAVIX) 75 MG tablet, Take 75 mg by mouth daily, Disp: , Rfl:     morphine (MSIR) 15 MG tablet, Take 15 mg by mouth every 6 hours as needed for Pain, Disp: , Rfl:     oxyCODONE-acetaminophen (PERCOCET) 5-325 MG per tablet, Take 1 tablet by mouth every 4 hours as needed for Pain, Disp: , Rfl:     aspirin 81 MG tablet, Take 81 mg by mouth daily, Disp: , Rfl:     tamsulosin (FLOMAX) 0.4 MG capsule, Take 0.4 mg by mouth daily, Disp: , Rfl:     simvastatin (ZOCOR) 80 MG tablet, Take 80 mg by mouth nightly, Disp: , Rfl:     lisinopril (PRINIVIL;ZESTRIL) 20 MG tablet, Take 20 mg by mouth daily, Disp: , Rfl:     senna-docusate (PERICOLACE) 8.6-50 MG per tablet, Take 1 tablet by mouth 3 times daily, Disp: , Rfl:     omeprazole (PRILOSEC) 20 MG capsule, Take 20 mg by mouth daily, Disp: , Rfl:     sildenafil (VIAGRA) 100 MG tablet, Take 100 mg by mouth once a week, Disp: , Rfl:       SOCIAL HISTORY     Social History     Social History Narrative    Not on file     Social History     Tobacco Use    Smoking status: Current Every Day Smoker     Packs/day: 0.50     Years: 65.00     Pack years: 32.50     Types: Cigarettes    Smokeless tobacco: Never Used   Vaping Use    Vaping Use: Never used   Substance Use Topics    Alcohol use: Yes     Comment: whiskey 1-2 times a week    Drug use: No         ALLERGIES   No Known Allergies      FAMILY HISTORY   No family history on file.       PREVIOUS place, and time. Cranial Nerves: No cranial nerve deficit. Psychiatric:         Mood and Affect: Mood normal.         Behavior: Behavior normal.         Thought Content: Thought content normal.         Judgment: Judgment normal.             MEDICAL DECISION MAKING   Initial Assessment:   3 75-year-old  male presents to the ED after incidental finding of hypertension at [facility]. Patient was noted to have hemoglobin of roughly 5.5 1 month ago. He denied blood transfusion per report. Patient states he just been feeling tired lately. He has no other complaints. He was at follow-up [place] appointment today when it was noted that his blood pressure was low and was told to come to the ED. Physical exam shows a chronically ill-appearing male patient in no acute distress. Blood pressure upon arrival was 78/48. He is A&O x3. Patient does take Plavix. Denies any trauma or injury. Denies any hematuria, hematochezia, melena    Plan:    CBC, BMP, LFT, APTT, INR, BNP, lactate, troponin, type and screen   EKG, CXR   500 cc 0.9% sodium chloride bolus   1 unit packed red cells    MDM:    EKG shows normal sinus rhythm with a ventricular rate of 69 bpm.  RI interval was 122 ms. No ST segment changes noted. No T wave abnormalities to suggest acute ischemia. CBC showed critical value of hemoglobin 5.7. No leukocytosis or leukopenia noted. Troponin and BNP are within normal limits. Lactate is elevated at 2.8. This could be secondary due to poor peripheral perfusion due to anemia. INR and APTT within normal limits. Patient was offered a digital rectal exam for an occult stool sample as he feels he does not go. Patient refused states that there is no blood in the stool. And he does not want the exam.  Plan to wait until he has to have a bowel movement to collect sample to look for occult bleeding.     Inform patient that it would likely benefit him and it is our recommendation that he be admitted for further evaluation and management. Patient states he understands and that he agrees to the plan. Based on microcytic anemia has been persistent ordered the patient an iron panel to assess for iron deficiency anemia. ED RESULTS   Laboratory results:  Labs Reviewed   CBC WITH AUTO DIFFERENTIAL - Abnormal; Notable for the following components:       Result Value    RBC 3.41 (*)     Hemoglobin 5.7 (*)     Hematocrit 23.1 (*)     MCV 67.7 (*)     MCH 16.7 (*)     MCHC 24.7 (*)     RDW-CV 23.4 (*)     RDW-SD 52.9 (*)     All other components within normal limits   BASIC METABOLIC PANEL - Abnormal; Notable for the following components:    Glucose 123 (*)     All other components within normal limits   HEPATIC FUNCTION PANEL - Abnormal; Notable for the following components:    ALT <5 (*)     Total Protein 5.8 (*)     All other components within normal limits   LACTATE, SEPSIS - Abnormal; Notable for the following components:    Lactic Acid, Sepsis 2.8 (*)     All other components within normal limits   GLOMERULAR FILTRATION RATE, ESTIMATED - Abnormal; Notable for the following components:    Est, Glom Filt Rate 66 (*)     All other components within normal limits   APTT   PROTIME-INR   TROPONIN   BRAIN NATRIURETIC PEPTIDE   ANION GAP   OSMOLALITY   SCAN OF BLOOD SMEAR   LACTATE, SEPSIS   BLOOD OCCULT STOOL SCREEN #1   IRON   IRON BINDING CAPACITY   FERRITIN   TRANSFERRIN   HEMOGLOBIN AND HEMATOCRIT, BLOOD   TYPE AND SCREEN   PREPARE RBC (CROSSMATCH)    Narrative:     [id number]     issued       Radiologic studies results:  XR CHEST PORTABLE   Final Result   1. There are linear parenchymal densities demonstrated overlying the left mid to upper lung zone. Cannot exclude an underlying pulmonary lesion. Further characterization with chest CT is recommended. 2. There are nonspecific distended partially visualized gas-filled bowel loops overlying the upper abdomen which are incompletely visualized.  Further evaluation with abdominal radiograph can be obtained if clinically indicated. **This report has been created using voice recognition software. It may contain minor errors which are inherent in voice recognition technology. **      Final report electronically signed by Dr. Bj Sumner on 8/31/2021 4:30 PM          ED Medications administered this visit:   Medications   0.9 % sodium chloride infusion (has no administration in time range)   0.9 % sodium chloride bolus (0 mLs IntraVENous Stopped 8/31/21 1724)         ED COURSE     ED Course as of Aug 31 1950   Tue Aug 31, 2021   1619 Notified from lab for critical value of Hgb 5.7    [SM]   1619 Hemoglobin Quant(!!): 5.7 [SM]   1713 Lactic Acid, Sepsis(!): 2.8 [SM]   1713 Est, Glom Filt Rate(!): 66 [SM]   1713 Troponin T: < 0.010 [SM]   1713 Pro-BNP: 334.5 [SM]   1713 aPTT: 24.4 [SM]   1713 INR: 1.03 [SM]      ED Course User Index  [SM] Jerri Verma DO       Strict return precautions and follow up instructions were discussed with the patient prior to discharge, with which the patient agrees. MEDICATION CHANGES     New Prescriptions    No medications on file         FINAL DISPOSITION     Final diagnoses:   Hypotension, unspecified hypotension type   Anemia, unspecified type     Condition: condition: stable  Dispo: Admit to med/surg floor      This transcription was electronically signed. Parts of this transcriptions may have been dictated by use of voice recognition software and electronically transcribed, and parts may have been transcribed with the assistance of an ED scribe. The transcription may contain errors not detected in proofreading. Please refer to my supervising physician's documentation if my documentation differs.     Electronically Signed: Roland Ponce DO, 08/31/21, 7:50 PM         Jerri Verma DO  Resident  08/31/21 5972

## 2021-08-31 NOTE — ED NOTES
Bed: 004A  Expected date: 8/31/21  Expected time: 3:21 PM  Means of arrival: [employer] Dept  Comments:      Ajith Mena RN  08/31/21 1522

## 2021-08-31 NOTE — ED NOTES
ED to inpatient nurses report    Chief Complaint   Patient presents with    Hypotension    Fatigue      Present to ED from home  LOC: alert and orientated to name, place, date  Vital signs   Vitals:    08/31/21 1723 08/31/21 1739 08/31/21 1800 08/31/21 1829   BP: 125/87 126/60 135/88 133/83   Pulse: 79 80 75 75   Resp: 16 17 16 19   Temp: 98.5 °F (36.9 °C) 98 °F (36.7 °C) 97.8 °F (36.6 °C)    TempSrc: Oral  Oral    SpO2: 98% 93% 97% 100%   Weight:       Height:          Oxygen Baseline RA    Current needs required RA   LDAs:   Peripheral IV 08/31/21 Left Antecubital (Active)   Site Assessment Clean;Dry; Intact 08/31/21 1546   Line Status Brisk blood return 08/31/21 1546   Dressing Status Clean;Dry; Intact 08/31/21 1546   Dressing Intervention New 08/31/21 1546       Peripheral IV 08/31/21 Right Antecubital (Active)   Site Assessment Dry; Intact; Clean 08/31/21 1546   Line Status Blood return noted;Brisk blood return;Flushed;Specimen collected 08/31/21 1546   Dressing Status Clean;Dry 08/31/21 1546   Dressing Intervention New 08/31/21 1546     Mobility: Requires assistance * 2  Pending ED orders: N/A  Present condition: Stable  Person of contract adopted alejandra Shaw, phone number [phone]      Electronically signed by Michael Parada RN on 8/31/2021 at 7:00 PM         Michael Parada RN  08/31/21 1910

## 2021-09-01 ENCOUNTER — ANESTHESIA EVENT (OUTPATIENT)
Dept: ENDOSCOPY | Age: 71
DRG: 811 | End: 2021-09-01
Payer: OTHER GOVERNMENT

## 2021-09-01 ENCOUNTER — APPOINTMENT (OUTPATIENT)
Dept: CT IMAGING | Age: 71
DRG: 811 | End: 2021-09-01
Payer: OTHER GOVERNMENT

## 2021-09-01 ENCOUNTER — ANESTHESIA (OUTPATIENT)
Dept: ENDOSCOPY | Age: 71
DRG: 811 | End: 2021-09-01
Payer: OTHER GOVERNMENT

## 2021-09-01 PROBLEM — J44.9 COPD WITHOUT EXACERBATION (HCC): Status: ACTIVE | Noted: 2021-09-01

## 2021-09-01 PROBLEM — R29.898 WEAKNESS OF BOTH LOWER EXTREMITIES: Status: ACTIVE | Noted: 2021-09-01

## 2021-09-01 PROBLEM — F17.200 TOBACCO USE DISORDER: Status: ACTIVE | Noted: 2021-09-01

## 2021-09-01 PROBLEM — I95.1 ORTHOSTATIC HYPOTENSION: Status: ACTIVE | Noted: 2021-09-01

## 2021-09-01 PROBLEM — I25.10 CORONARY ARTERY DISEASE INVOLVING NATIVE CORONARY ARTERY OF NATIVE HEART WITHOUT ANGINA PECTORIS: Status: ACTIVE | Noted: 2021-09-01

## 2021-09-01 PROBLEM — E87.20 LACTIC ACIDOSIS: Status: ACTIVE | Noted: 2021-09-01

## 2021-09-01 LAB
ALLEN TEST: POSITIVE
ANISOCYTOSIS: PRESENT
BACTERIA: ABNORMAL
BASE EXCESS (CALCULATED): -1.2 MMOL/L (ref -2.5–2.5)
BASOPHILIA: ABNORMAL
BASOPHILS # BLD: 0.4 %
BASOPHILS ABSOLUTE: 0 THOU/MM3 (ref 0–0.1)
BILIRUBIN URINE: NEGATIVE
BLOOD, URINE: NEGATIVE
CASTS: ABNORMAL /LPF
CASTS: ABNORMAL /LPF
CHARACTER, URINE: CLEAR
COLLECTED BY:: ABNORMAL
COLOR: YELLOW
CRYSTALS: ABNORMAL
DACROCYTES: ABNORMAL
DEVICE: ABNORMAL
DIFFERENTIAL TYPE: ABNORMAL
ELLIPTOCYTES: ABNORMAL
EOSINOPHIL # BLD: 3.1 %
EOSINOPHILS ABSOLUTE: 0.2 THOU/MM3 (ref 0–0.4)
EPITHELIAL CELLS, UA: ABNORMAL /HPF
ERYTHROCYTE [DISTWIDTH] IN BLOOD BY AUTOMATED COUNT: 23.4 % (ref 11.5–14.5)
ERYTHROCYTE [DISTWIDTH] IN BLOOD BY AUTOMATED COUNT: 25.5 % (ref 11.5–14.5)
ERYTHROCYTE [DISTWIDTH] IN BLOOD BY AUTOMATED COUNT: 52.9 FL (ref 35–45)
ERYTHROCYTE [DISTWIDTH] IN BLOOD BY AUTOMATED COUNT: 62.2 FL (ref 35–45)
FERRITIN: 27 NG/ML (ref 22–322)
FOLATE: 7.1 NG/ML (ref 4.8–24.2)
GLUCOSE, URINE: NEGATIVE MG/DL
HCO3: 23 MMOL/L (ref 23–28)
HCT VFR BLD CALC: 23.1 % (ref 42–52)
HCT VFR BLD CALC: 28.8 % (ref 42–52)
HEMOGLOBIN: 5.7 GM/DL (ref 14–18)
HEMOGLOBIN: 7.6 GM/DL (ref 14–18)
HYPOCHROMIA: PRESENT
IMMATURE GRANS (ABS): 0.07 THOU/MM3 (ref 0–0.07)
IMMATURE GRANULOCYTES: 1 %
IRON: 209 UG/DL (ref 65–195)
KETONES, URINE: NEGATIVE
LACTIC ACID: 1.8 MMOL/L (ref 0.5–2)
LEUKOCYTE ESTERASE, URINE: ABNORMAL
LYMPHOCYTES # BLD: 14.5 %
LYMPHOCYTES ABSOLUTE: 1 THOU/MM3 (ref 1–4.8)
MCH RBC QN AUTO: 16.7 PG (ref 26–33)
MCH RBC QN AUTO: 19.1 PG (ref 26–33)
MCHC RBC AUTO-ENTMCNC: 24.7 GM/DL (ref 32.2–35.5)
MCHC RBC AUTO-ENTMCNC: 26.4 GM/DL (ref 32.2–35.5)
MCV RBC AUTO: 67.7 FL (ref 80–94)
MCV RBC AUTO: 72.4 FL (ref 80–94)
MICROCYTES: PRESENT
MISCELLANEOUS LAB TEST RESULT: ABNORMAL
MONOCYTES # BLD: 7.8 %
MONOCYTES ABSOLUTE: 0.6 THOU/MM3 (ref 0.4–1.3)
MRSA SCREEN RT-PCR: NEGATIVE
NITRITE, URINE: POSITIVE
NUCLEATED RED BLOOD CELLS: 0 /100 WBC
O2 SATURATION: 95 %
PATHOLOGIST REVIEW: ABNORMAL
PCO2: 34 MMHG (ref 35–45)
PH BLOOD GAS: 7.43 (ref 7.35–7.45)
PH UA: 7.5 (ref 5–9)
PLATELET # BLD: 162 THOU/MM3 (ref 130–400)
PLATELET # BLD: 184 THOU/MM3 (ref 130–400)
PLATELET ESTIMATE: ADEQUATE
PMV BLD AUTO: 9.6 FL (ref 9.4–12.4)
PMV BLD AUTO: 9.9 FL (ref 9.4–12.4)
PO2: 72 MMHG (ref 71–104)
POIKILOCYTES: ABNORMAL
PROSTATE SPECIFIC ANTIGEN: 6.28 NG/ML (ref 0–1)
PROTEIN UA: NEGATIVE MG/DL
RBC # BLD: 3.41 MILL/MM3 (ref 4.7–6.1)
RBC # BLD: 3.98 MILL/MM3 (ref 4.7–6.1)
RBC URINE: ABNORMAL /HPF
RENAL EPITHELIAL, UA: ABNORMAL
SCHISTOCYTES: ABNORMAL
SEG NEUTROPHILS: 73.2 %
SEGMENTED NEUTROPHILS ABSOLUTE COUNT: 5.3 THOU/MM3 (ref 1.8–7.7)
SOURCE, BLOOD GAS: ABNORMAL
SPECIFIC GRAVITY UA: > 1.03 (ref 1–1.03)
SPHEROCYTES: ABNORMAL
TOTAL IRON BINDING CAPACITY: 419 UG/DL (ref 171–450)
TRANSFERRIN: 341 MG/DL (ref 200–360)
UROBILINOGEN, URINE: 1 EU/DL (ref 0–1)
VANCOMYCIN RESISTANT ENTEROCOCCUS: NEGATIVE
VITAMIN B-12: 299 PG/ML (ref 211–911)
WBC # BLD: 12.2 THOU/MM3 (ref 4.8–10.8)
WBC # BLD: 7.2 THOU/MM3 (ref 4.8–10.8)
WBC UA: ABNORMAL /HPF
YEAST: ABNORMAL

## 2021-09-01 PROCEDURE — 81001 URINALYSIS AUTO W/SCOPE: CPT

## 2021-09-01 PROCEDURE — 82803 BLOOD GASES ANY COMBINATION: CPT

## 2021-09-01 PROCEDURE — C9113 INJ PANTOPRAZOLE SODIUM, VIA: HCPCS | Performed by: NURSE PRACTITIONER

## 2021-09-01 PROCEDURE — 3609017100 HC EGD: Performed by: INTERNAL MEDICINE

## 2021-09-01 PROCEDURE — 2500000003 HC RX 250 WO HCPCS: Performed by: NURSE ANESTHETIST, CERTIFIED REGISTERED

## 2021-09-01 PROCEDURE — 85027 COMPLETE CBC AUTOMATED: CPT

## 2021-09-01 PROCEDURE — 6370000000 HC RX 637 (ALT 250 FOR IP): Performed by: STUDENT IN AN ORGANIZED HEALTH CARE EDUCATION/TRAINING PROGRAM

## 2021-09-01 PROCEDURE — 2720000010 HC SURG SUPPLY STERILE: Performed by: INTERNAL MEDICINE

## 2021-09-01 PROCEDURE — 6370000000 HC RX 637 (ALT 250 FOR IP): Performed by: INTERNAL MEDICINE

## 2021-09-01 PROCEDURE — 6360000002 HC RX W HCPCS: Performed by: NURSE ANESTHETIST, CERTIFIED REGISTERED

## 2021-09-01 PROCEDURE — 2580000003 HC RX 258: Performed by: INTERNAL MEDICINE

## 2021-09-01 PROCEDURE — 87086 URINE CULTURE/COLONY COUNT: CPT

## 2021-09-01 PROCEDURE — 87077 CULTURE AEROBIC IDENTIFY: CPT

## 2021-09-01 PROCEDURE — 2060000000 HC ICU INTERMEDIATE R&B

## 2021-09-01 PROCEDURE — 6360000004 HC RX CONTRAST MEDICATION: Performed by: INTERNAL MEDICINE

## 2021-09-01 PROCEDURE — 6360000002 HC RX W HCPCS: Performed by: NURSE PRACTITIONER

## 2021-09-01 PROCEDURE — 74177 CT ABD & PELVIS W/CONTRAST: CPT

## 2021-09-01 PROCEDURE — 36600 WITHDRAWAL OF ARTERIAL BLOOD: CPT

## 2021-09-01 PROCEDURE — 36415 COLL VENOUS BLD VENIPUNCTURE: CPT

## 2021-09-01 PROCEDURE — 6370000000 HC RX 637 (ALT 250 FOR IP): Performed by: NURSE PRACTITIONER

## 2021-09-01 PROCEDURE — 87186 SC STD MICRODIL/AGAR DIL: CPT

## 2021-09-01 PROCEDURE — 3700000000 HC ANESTHESIA ATTENDED CARE: Performed by: INTERNAL MEDICINE

## 2021-09-01 PROCEDURE — 0DJ08ZZ INSPECTION OF UPPER INTESTINAL TRACT, VIA NATURAL OR ARTIFICIAL OPENING ENDOSCOPIC: ICD-10-PCS | Performed by: INTERNAL MEDICINE

## 2021-09-01 PROCEDURE — 99233 SBSQ HOSP IP/OBS HIGH 50: CPT | Performed by: INTERNAL MEDICINE

## 2021-09-01 PROCEDURE — 83605 ASSAY OF LACTIC ACID: CPT

## 2021-09-01 PROCEDURE — 2709999900 HC NON-CHARGEABLE SUPPLY: Performed by: INTERNAL MEDICINE

## 2021-09-01 PROCEDURE — 7100000010 HC PHASE II RECOVERY - FIRST 15 MIN: Performed by: INTERNAL MEDICINE

## 2021-09-01 RX ORDER — POLYETHYLENE GLYCOL 3350 17 G/17G
17 POWDER, FOR SOLUTION ORAL 2 TIMES DAILY
Status: DISCONTINUED | OUTPATIENT
Start: 2021-09-01 | End: 2021-09-04 | Stop reason: HOSPADM

## 2021-09-01 RX ORDER — SODIUM CHLORIDE 450 MG/100ML
INJECTION, SOLUTION INTRAVENOUS CONTINUOUS
Status: CANCELLED | OUTPATIENT
Start: 2021-09-01

## 2021-09-01 RX ORDER — LIDOCAINE HYDROCHLORIDE 20 MG/ML
INJECTION, SOLUTION EPIDURAL; INFILTRATION; INTRACAUDAL; PERINEURAL PRN
Status: DISCONTINUED | OUTPATIENT
Start: 2021-09-01 | End: 2021-09-01 | Stop reason: SDUPTHER

## 2021-09-01 RX ORDER — DOCUSATE SODIUM 100 MG/1
100 CAPSULE, LIQUID FILLED ORAL 2 TIMES DAILY
Status: DISCONTINUED | OUTPATIENT
Start: 2021-09-01 | End: 2021-09-04 | Stop reason: HOSPADM

## 2021-09-01 RX ORDER — SODIUM CHLORIDE 0.9 % (FLUSH) 0.9 %
5-40 SYRINGE (ML) INJECTION EVERY 12 HOURS SCHEDULED
Status: CANCELLED | OUTPATIENT
Start: 2021-09-01

## 2021-09-01 RX ORDER — SODIUM CHLORIDE 9 MG/ML
25 INJECTION, SOLUTION INTRAVENOUS PRN
Status: CANCELLED | OUTPATIENT
Start: 2021-09-01

## 2021-09-01 RX ORDER — FOLIC ACID 1 MG/1
1 TABLET ORAL DAILY
Status: DISCONTINUED | OUTPATIENT
Start: 2021-09-01 | End: 2021-09-04 | Stop reason: HOSPADM

## 2021-09-01 RX ORDER — LANOLIN ALCOHOL/MO/W.PET/CERES
100 CREAM (GRAM) TOPICAL DAILY
Status: DISCONTINUED | OUTPATIENT
Start: 2021-09-01 | End: 2021-09-04 | Stop reason: HOSPADM

## 2021-09-01 RX ORDER — PROPOFOL 10 MG/ML
INJECTION, EMULSION INTRAVENOUS PRN
Status: DISCONTINUED | OUTPATIENT
Start: 2021-09-01 | End: 2021-09-01 | Stop reason: SDUPTHER

## 2021-09-01 RX ORDER — SODIUM CHLORIDE 0.9 % (FLUSH) 0.9 %
5-40 SYRINGE (ML) INJECTION PRN
Status: CANCELLED | OUTPATIENT
Start: 2021-09-01

## 2021-09-01 RX ORDER — PANTOPRAZOLE SODIUM 40 MG/1
40 TABLET, DELAYED RELEASE ORAL
Status: DISCONTINUED | OUTPATIENT
Start: 2021-09-01 | End: 2021-09-04 | Stop reason: HOSPADM

## 2021-09-01 RX ORDER — PANTOPRAZOLE SODIUM 40 MG/10ML
40 INJECTION, POWDER, LYOPHILIZED, FOR SOLUTION INTRAVENOUS 2 TIMES DAILY
Status: DISCONTINUED | OUTPATIENT
Start: 2021-09-01 | End: 2021-09-01

## 2021-09-01 RX ORDER — POLYETHYLENE GLYCOL 3350, SODIUM CHLORIDE, SODIUM BICARBONATE, POTASSIUM CHLORIDE 420; 11.2; 5.72; 1.48 G/4L; G/4L; G/4L; G/4L
4000 POWDER, FOR SOLUTION ORAL ONCE
Status: DISCONTINUED | OUTPATIENT
Start: 2021-09-01 | End: 2021-09-01

## 2021-09-01 RX ORDER — SENNA AND DOCUSATE SODIUM 50; 8.6 MG/1; MG/1
2 TABLET, FILM COATED ORAL NIGHTLY
Status: DISCONTINUED | OUTPATIENT
Start: 2021-09-02 | End: 2021-09-04 | Stop reason: HOSPADM

## 2021-09-01 RX ORDER — METOPROLOL SUCCINATE 25 MG/1
25 TABLET, EXTENDED RELEASE ORAL DAILY
Status: DISCONTINUED | OUTPATIENT
Start: 2021-09-02 | End: 2021-09-04 | Stop reason: HOSPADM

## 2021-09-01 RX ADMIN — Medication 100 MG: at 18:03

## 2021-09-01 RX ADMIN — POLYETHYLENE GLYCOL 3350 17 G: 17 POWDER, FOR SOLUTION ORAL at 20:35

## 2021-09-01 RX ADMIN — PANTOPRAZOLE SODIUM 40 MG: 40 TABLET, DELAYED RELEASE ORAL at 06:01

## 2021-09-01 RX ADMIN — PANTOPRAZOLE SODIUM 40 MG: 40 INJECTION, POWDER, FOR SOLUTION INTRAVENOUS at 12:48

## 2021-09-01 RX ADMIN — SODIUM CHLORIDE, PRESERVATIVE FREE 10 ML: 5 INJECTION INTRAVENOUS at 20:35

## 2021-09-01 RX ADMIN — PHENYLEPHRINE HYDROCHLORIDE 200 MCG: 10 INJECTION INTRAVENOUS at 15:25

## 2021-09-01 RX ADMIN — ATORVASTATIN CALCIUM 40 MG: 40 TABLET, FILM COATED ORAL at 20:35

## 2021-09-01 RX ADMIN — SODIUM CHLORIDE, POTASSIUM CHLORIDE, SODIUM LACTATE AND CALCIUM CHLORIDE: 600; 310; 30; 20 INJECTION, SOLUTION INTRAVENOUS at 10:01

## 2021-09-01 RX ADMIN — PANTOPRAZOLE SODIUM 40 MG: 40 TABLET, DELAYED RELEASE ORAL at 16:27

## 2021-09-01 RX ADMIN — DOCUSATE SODIUM 100 MG: 100 CAPSULE ORAL at 20:35

## 2021-09-01 RX ADMIN — PROPOFOL 80 MG: 10 INJECTION, EMULSION INTRAVENOUS at 15:17

## 2021-09-01 RX ADMIN — IOPAMIDOL 80 ML: 755 INJECTION, SOLUTION INTRAVENOUS at 12:34

## 2021-09-01 RX ADMIN — LIDOCAINE HYDROCHLORIDE 80 MG: 20 INJECTION, SOLUTION EPIDURAL; INFILTRATION; INTRACAUDAL; PERINEURAL at 15:17

## 2021-09-01 RX ADMIN — SODIUM CHLORIDE, POTASSIUM CHLORIDE, SODIUM LACTATE AND CALCIUM CHLORIDE: 600; 310; 30; 20 INJECTION, SOLUTION INTRAVENOUS at 15:09

## 2021-09-01 RX ADMIN — FOLIC ACID 1 MG: 1 TABLET ORAL at 18:03

## 2021-09-01 ASSESSMENT — PAIN SCALES - GENERAL
PAINLEVEL_OUTOF10: 0

## 2021-09-01 ASSESSMENT — PAIN - FUNCTIONAL ASSESSMENT: PAIN_FUNCTIONAL_ASSESSMENT: 0-10

## 2021-09-01 NOTE — ANESTHESIA POSTPROCEDURE EVALUATION
Department of Anesthesiology  Postprocedure Note    Patient: Percell Boxer  MRN: [id number]  YOB: 1950  Date of evaluation: 9/1/2021  Time:  4:02 PM     Procedure Summary     Date: 09/01/21 Room / Location: [address] [place] [address]    Anesthesia Start: 8786 Anesthesia Stop: 9517    Procedure: EGD ESOPHAGOGASTRODUODENOSCOPY (N/A ) Diagnosis: (ANEMIA)    Surgeons: Rossy Bermeo MD Responsible Provider: Codi Zavala DO    Anesthesia Type: Not recorded ASA Status: Not recorded          Anesthesia Type: No value filed. Nadiya Phase I: Nadiya Score: 8    Andiya Phase II: Nadiya Score: 9    Last vitals: Reviewed and per EMR flowsheets.        Anesthesia Post Evaluation    Patient location during evaluation: bedside  Patient participation: complete - patient participated  Level of consciousness: awake and alert  Pain score: 0  Airway patency: patent  Nausea & Vomiting: no nausea and no vomiting  Complications: no  Cardiovascular status: hemodynamically stable  Respiratory status: spontaneous ventilation, acceptable and nasal cannula (pt on 1l o2)  Hydration status: stable

## 2021-09-01 NOTE — ED NOTES
Dr. Armani Sandra at bedside to talk to pt. Pt asking about food. Dr. Jhony Graham gave verbal permission that pt can eat but will be NPO at midnight.  Esther Chino RN gave pt food box     Alexandra [facility]  08/31/21 2046

## 2021-09-01 NOTE — CARE COORDINATION
9/1/21, 12:22 PM EDT  DISCHARGE PLANNING EVALUATION:    Keren Ortega       [facility]   Location: [facility]/002-A Reason for admit: Symptomatic anemia [D64.9]  Hypotension, unspecified hypotension type [I95.9]  Anemia, unspecified type [D64.9]   PMH:  has a past medical history of COPD (chronic obstructive pulmonary disease) ([address] Coronary artery disease involving native coronary artery of native heart, History of blood transfusion, and Hypertension. Procedure:   8/31 CXR  1. There are linear parenchymal densities demonstrated overlying the left mid to upper lung zone. 2. There are nonspecific distended partially visualized gas-filled bowel loops overlying the upper abdomen which are incompletely visualized  9/1 EGD planned  Barriers to Discharge:  Anemia. H 5.7, Wbc 12.2; monitor. GI plans EGD today. Oxygen 1L, IVF, IV PPI continued  PCP: Oda Severs, MD  Readmission Risk Score: 14%    Patient Goals/Plan/Treatment Preferences: plans home w JOE Stewart, monitor for home oxygen eval if not weaned off; will ask Attending; not in room at time of rounds; need meet/greet  Transportation/Food Security/Housekeeping Addressed:  No issues identified.

## 2021-09-01 NOTE — PROGRESS NOTES
Patient refuses to turn every 2 hours as recommended. Education provided regarding the benefits of repositioning and the risk to skin integrity associated with not repositioning as recommended.

## 2021-09-01 NOTE — H&P
[address] [address] Endoscopy    Pre-Endoscopy H&PE      Patient: Soumya Gaitan    : 1950    Acct#: [de-identified]  Primary Care Physician: Hina Durán MD   Date of evaluation: 2021    Planned Procedure:    [x]EGD    []Enteroscopy    []PEG    []PEG change    []PEG removal  []Variceal banding    [x]Biopsy   []Dilation      [x]Control of bleeding  []Destruction of lesion by [facility]    []Stent Placement  []Foreign Body Removal    []Snare Polypectomy  []Other:       []Colonoscopy  []Flex Sigmoid []EUS, rectal      []Biopsy   []Dilation      []Control of bleeding  []Destruction of lesion by [facility]    []Stent Placement  []Foreign Body Removal    []Snare Polypectomy  []Other:      Planned Sedation  []Conscious Sedation [x]MAC/Propofol []Anesthesia    []None    Airway:  Adequate for planned sedation     Indication:   Anemia, profound    History:  The patient is a 66 y.o. male with chronic pain on narcotic analgesics and CAD s/p PCI  on ASA and Plavix admitted 21 for profound anemia and abd distension. Physical Exam:  VITALS: BP (!) 108/54   Pulse 79   Temp 99.3 °F (37.4 °C) (Oral)   Resp 16   Ht 5' 8\" (1.727 m)   Wt 116 lb (52.6 kg)   SpO2 98%   BMI 17.64 kg/m²   The patient is a 66 y.o. male in no acute distress. HEAD: Normal cephalic/atraumatic. Extra-occular motions intact bilaterally. NECK: No lymphadenopathy or bruits. CHEST: Rises equally on inspiration. Clear to auscultation bilaterally. CARDIOVASCULAR: Regular rate and rhythm without murmurs, rubs or gallops. ABDOMEN: Soft, nontender, and nondistended with normal bowel sounds. No Hepatosplemomegaly. UPPER EXTREMITIES: no cyanosis, clubbing, or edema. DERM: no rash or jaundice. LOWER EXTREMITIES: no cyanosis, clubbing, or edema. NEURO: Alert and oriented times four. Patient moves all extremities and has gross sensation in all extremities.     ASA: ASA 3 - Patient with moderate systemic disease with functional limitations    See GI consult dated 9/1/21    Lisbet Maldonado MD, MD  3:05 PM 9/1/2021

## 2021-09-01 NOTE — INTERVAL H&P NOTE
Update History & Physical    No reason for profound anemia on EGD  Colonoscopy planned    Plan: The risks, benefits, expected outcome, and alternative to the recommended procedure have been discussed with the patient. Patient understands and wants to proceed with the procedure.      Electronically signed by Samm Silver MD on 9/1/2021 at 3:29 PM

## 2021-09-01 NOTE — PROGRESS NOTES
This nurse spoke with patient's son Jimenez Betancourt, provided update (verbal OK provided by pt to give update and HIPPA code). Pt stated he would like to continue to rest tonight. Anticipating colonoscopy tomorrow around 1230.

## 2021-09-01 NOTE — PROGRESS NOTES
Hospitalist Progress Note      Patient:  Maciel To    Unit/Bed:4K-02/002-A  YOB: 1950  MRN: [id number]   Acct: [de-identified]     PCP: Kayley Peace MD  Date of Admission: 8/31/2021    Assessment/Plan:    Symptomatic hypochromic microcytic anemia   Hgb 7.6 (5.7 on admission pre-transfusion), MCV 72.4, MCHC 26.4, Fe 209; denies shortness of breath, dizziness, palpitations  -Transfuse with PRBC if Hgb<7, CBC daily   -EGD done today found no reason for profound anemia  -GI plans to do colonoscopy tomorrow  -Continue protonix 40 BID   -Heme/onc consulted for bone marrow assessment as possible cause of anemia  -ABG ordered due to consistent lethargy   -Plan for CT head without contrast if ABG does not indicate hypercapnic respiratory failure   -Held morphine and percocet due to fatigue and lethargy   -Continue      Primary HTN   Hypotensive on admission   -Lisinopril held    CAD s/p stent  -Continue toprol  -ASA & plavix held due to anemia and active bleeding      Reactive leukocytosis   Likely due to blood transfusion, afebrile, no clear source of infection  -Trend CBC     Alcohol abuse   Reports drinking 14oz like velvet whiskey 3-5 times a week; does not appear to be in withdrawal  -Started thiamine and folate supplementation  -Neuro checks daily     BPH   Reports smelly urine, dribbling and incomplete emptying. Unsure of incontinence.  -Continued Flomax  -Urinalysis and urine culture ordered    HLD   -Continue Lipitor    Nicotine abuse   -Continue nicotine patch    Malnutrition   -Consulted dietician and nutrition services     Expected discharge date:  TBD    Disposition Plan: Inpatient    Chief Complaint: Abnormal outpatient labs with anemia    Hospital Course:  [address] male patient who follows up with the [facility] in [address] to the hospital today by son because of abnormal outpatient labs taken 3 weeks ago at the [facility]. .     Patient had routine blood work in rouses to verbal stimuli and answers questions but goes back to sleep immediately. Patient in no acute distress. ROS (12 point review of systems completed. Pertinent positives noted. Otherwise ROS is negative)     Medications:  Reviewed    Infusion Medications    sodium chloride      sodium chloride      lactated ringers 100 mL/hr at 09/01/21 1001     Scheduled Medications    [START ON 9/2/2021] metoprolol succinate  25 mg Oral Daily    [START ON 9/2/2021] sennosides-docusate sodium  2 tablet Oral Nightly    polyethylene glycol  17 g Oral BID    docusate sodium  100 mg Oral BID    pantoprazole  40 mg Oral BID AC    thiamine  100 mg Oral Daily    folic acid  1 mg Oral Daily    sodium chloride flush  10 mL IntraVENous 2 times per day    [Held by provider] enoxaparin  40 mg SubCUTAneous Daily    nicotine  1 patch TransDERmal Daily    tamsulosin  0.4 mg Oral Daily    atorvastatin  40 mg Oral Nightly     PRN Meds: sodium chloride, sodium chloride flush, sodium chloride, ondansetron **OR** ondansetron, polyethylene glycol, acetaminophen **OR** acetaminophen, [Held by provider] oxyCODONE-acetaminophen, ipratropium-albuterol, [Held by provider] morphine      Intake/Output Summary (Last 24 hours) at 9/1/2021 1833  Last data filed at 9/1/2021 1615  Gross per 24 hour   Intake 1501 ml   Output 575 ml   Net 926 ml       Diet:  ADULT DIET; Regular    Exam:  BP (!) 108/56   Pulse 85   Temp 97.7 °F (36.5 °C) (Oral)   Resp 18   Ht 5' 8\" (1.727 m)   Wt 116 lb (52.6 kg)   SpO2 97%   BMI 17.64 kg/m²     General appearance: Cooperative, ill-appearing, lethargic, pale, cachectic, no acute distress. HEENT: Pupils equal, round, and reactive to light. Conjunctivae/corneas slightly pale. Neck: Supple, with full range of motion. No jugular venous distention. Trachea midline. Respiratory:  Normal respiratory effort. Clear to auscultation, bilaterally without Rales/Rhonchi.   Mild wheezes bilaterally. Cardiovascular: Regular rate and rhythm with normal S1/S2 without murmurs, rubs or gallops. Abdomen: Soft, non-tender, non-distended with normal bowel sounds. Musculoskeletal: passive and active ROM x 4 extremities. Skin: Skin pale and dry no rashes or lesions. Neurologic: Lethargic, unable to assess  Psychiatric: Lethargic, thought content appropriate, normal insight  Capillary Refill: Brisk,< 3 seconds   Peripheral Pulses: +2 palpable, equal bilaterally       Labs:   Recent Labs     08/31/21  1545 08/31/21  2039 09/01/21  0537   WBC 7.2  --  12.2*   HGB 5.7* 7.5* 7.6*   HCT 23.1* 28.6* 28.8*     --  162     Recent Labs     08/31/21  1545      K 4.3      CO2 25   BUN 16   CREATININE 1.1   CALCIUM 8.6     Recent Labs     08/31/21  1545   AST 11   ALT <5*   BILIDIR <0.2   BILITOT 0.4   ALKPHOS 77     Recent Labs     08/31/21  1545   INR 1.03     No results for input(s): Cirilo Arivaca in the last 72 hours. Microbiology:      Urinalysis:      Lab Results   Component Value Date    NITRU NEGATIVE 09/02/2018    WBCUA 15-25 09/02/2018    BACTERIA NONE 09/02/2018    RBCUA 10-15 09/02/2018    BLOODU NEGATIVE 09/02/2018    GLUCOSEU NEGATIVE 09/02/2018       Radiology:  CT ABDOMEN PELVIS W IV CONTRAST Additional Contrast? None   Final Result   1. Right lower lung infiltrate. 2. Bilateral small effusions. 3. Cholelithiasis. 4. Mild atrophy of the left kidney. 5. Constipation. 6. Old appearing L1 anterior wedge compression fracture. 7. Small left hydrocele. 8. Enlarged prostate. 9. Distal aortic aneurysm measuring 3.8 cm with atherosclerotic plaque. True lumen measuring 1.7 cm. **This report has been created using voice recognition software. It may contain minor errors which are inherent in voice recognition technology. **      Final report electronically signed by Dr. Zeke Sanchez on 9/1/2021 1:03 PM      XR CHEST PORTABLE   Final Result   1.  There are linear parenchymal densities demonstrated overlying the left mid to upper lung zone. Cannot exclude an underlying pulmonary lesion. Further characterization with chest CT is recommended. 2. There are nonspecific distended partially visualized gas-filled bowel loops overlying the upper abdomen which are incompletely visualized. Further evaluation with abdominal radiograph can be obtained if clinically indicated. **This report has been created using voice recognition software. It may contain minor errors which are inherent in voice recognition technology. **      Final report electronically signed by Dr. Martina Hernandez on 8/31/2021 4:30 PM          DVT prophylaxis: [] Lovenox                                 [] SCDs                                 [] SQ Heparin                                 [] Encourage ambulation           [] Already on Anticoagulation     Code Status: Full Code    PT/OT Eval Status:    Tele:   [x] yes             [] no        Electronically signed by Analisa Toth DO on 9/1/2021 at 6:33 PM

## 2021-09-01 NOTE — OP NOTE
[address] [address] Endoscopy    Patient: Nayeli Menjivar  : 1950  Acct#: [de-identified]  Date of evaluation: 2021  Primary Care Physician: Iona Spivey MD     Procedure:    [x]EGD    []Enteroscopy    []Biopsy   []Dilation      []PEG    []PEG change    []PEG removal  []Variceal banding    []Control of bleeding  []Destruction of lesion by [facility]    []Stent Placement  []Foreign Body Removal    []Snare Polypectomy  []Other:     []Aborted:        []Colonoscopy  []Flex Sigmoid []EUS, rectal     []Biopsy   []Snare Polypectomy    []Control of bleeding  []Destruction of lesion by [facility]    []Stent Placement  []Foreign Body Removal    []Dilation    []Other:    []Aborted:   []Tattoo    Indication:   Anemia, profound    History:  The patient is a 66 y.o. male with chronic pain on narcotic analgesics and CAD s/p PCI  on ASA and Plavix admitted 21 for profound anemia and abd distension. Physical Exam:  VITALS: BP (!) 108/54   Pulse 79   Temp 99.3 °F (37.4 °C) (Oral)   Resp 16   Ht 5' 8\" (1.727 m)   Wt 116 lb (52.6 kg)   SpO2 98%   BMI 17.64 kg/m²   The patient is a 66 y.o. male in no acute distress. HEAD: Normal cephalic/atraumatic. Extra-occular motions intact bilaterally. NECK: No lymphadenopathy or bruits. CHEST: Rises equally on inspiration. Clear to auscultation bilaterally. CARDIOVASCULAR: Regular rate and rhythm without murmurs, rubs or gallops. ABDOMEN: Soft, nontender, and nondistended with normal bowel sounds. No Hepatosplemomegaly. UPPER EXTREMITIES: no cyanosis, clubbing, or edema. DERM: no rash or jaundice. LOWER EXTREMITIES: no cyanosis, clubbing, or edema. NEURO: Alert and oriented times four. Patient moves all extremities and has gross sensation in all extremities.     ASA: ASA 3 - Patient with moderate systemic disease with functional limitations      MEDICATION:    MAC/Propofol/Anesthesia:  Yes     Photo:  Yes  Biopsy:  No Description of Procedure: The risks and benefits of the procedure were described to the patient or their representative if unable to give consent including but not limited to bleeding, infection, poking a hole someplace requiring surgery to fix it, having a reaction to medication, and death. The patient or their representative if unable to give consent understood these risks and provided informed consent. Airway was assessed and is adequate for the planned sedation. Anesthesia: MAC  Estimated Blood Loss: less than 50   Complications: None  Specimens: Was Not Obtained     Sedation was administered by anesthesia who monitored the patient during the procedure. The patient was placed in the left lateral decubitus position. A forward-viewing Olympus endoscope was lubricated and inserted through the mouth into the oropharynx. Under direct visualization, the upper esophagus was intubated. The scope was advanced through the esophagus and stomach to second portion of duodenum. Scope was slowly withdrawn with good views of mucosal surfaces. The scope was retroflexed in the fundus. Findings and maneuvers are listed in impression below. The patient tolerated the procedure well. The scope was removed. There were no immediate complications. IMPRESSION:  1. Esophagus - LA grade B esophagitis  2. Stomach - small to moderate hiatal hernia  3. Duodenum - normal   4. ASA and Plavix use  5. Chronic pain on narcotic analgesics  6. No reason for profound anemia    RECOMMENDATIONS:    1. Continue bid ppi  2. Colonoscopy tomorrow if patient able to prep, high risk given recent Plavix use but benefits outweigh risks    Nancy Zhong MD, MD  3:23 PM 9/1/2021    Colonoscopy cancelled per request of attending as they feel anemia is not GI.  GI signing off, call with issues.     Nancy Zhong MD, MD  4:13 PM 9/1/2021

## 2021-09-01 NOTE — ED NOTES
Pt and vs reassessed. RR even and unlabored. Pt resting in bed with eyes closed. Pt wakes and responds to voice. No distress noted.  Will continue to monitor      Coleman Kamara RN  08/31/21 2011

## 2021-09-01 NOTE — H&P
Patient: Nela Gunderson    Unit/Bed: 5L-12/553-B    YOB: 1950    MRN: [id number]    Acct: [de-identified]     Admitting Diagnosis: Symptomatic anemia [D64.9]  Hypotension, unspecified hypotension type [I95.9]  Anemia, unspecified type [D64.9]    Admit Date:  8/31/2021    CC: Abnormal outpatient labs with anemia. HPI :  75-year-old male patient who follows up with the [facility] in [place]. Brought to the hospital today by son because of abnormal outpatient labs taken 3 weeks ago at the [facility]. Tamra Douglas Patient had routine blood work in [place] at the [facility] 3 weeks ago. He was noted to have be anemic but refused medical evaluation and treatment. However, he has been taking iron supplement for anemia. His hemoglobin today was 5.7 from 10.7 on September 02, 2018 . Patient was agreeable to PRBC transfusion in the ED this time around and 1 unit of PRBC transfused. He denies any history of hematochezia, hematemesis or melena. He also denies any symptoms of anemia though he was found to be hypotensive on admission in the ED with systolic blood pressure of  78/48. He has CAD with a single stent in 2006 but asymptomatic. Initial vital signs temperature 36.9 °C, respiratory 14, heart rate 68, blood pressure 78/48, oxygen saturation 98% on room air. Labs on admission sodium 137, potassium 4.3, CO2 25, creatinine 1.1, lactic acid 2.80, blood sugar 123, calcium 8.6, total protein 5.8.    proBNP 334.5, troponin first set less than 0.010. Albumin 3.7, alkaline phosphatase 77, ALT less than 5, AST 11, total bilirubin 0.4, direct globin less than 0.2. WBC 7.2, hemoglobin 5.7, MCV 67.7, platelets 233 and INR 1.03. Twelve-lead EKG which reviewed shows normal sinus rhythm at 69 bpm.  QTC of 413 ms. No ST segment elevation or depression. Chest x-ray which reviewed is negative for infiltrate. Hyperinflation due to underlying COPD.       ED treatment included normal saline bolus and PRBC transfusion x1 unit. Review of Systems   Constitutional: Negative for activity change, appetite change, chills, diaphoresis, fatigue, fever and unexpected weight change. HENT: Negative for congestion, dental problem, drooling, ear discharge, ear pain, facial swelling, hearing loss, mouth sores, nosebleeds, postnasal drip, rhinorrhea, sinus pressure, sinus pain, sneezing, sore throat, tinnitus, trouble swallowing and voice change. Eyes: Negative for photophobia, pain, discharge, redness, itching and visual disturbance. Respiratory: Negative for apnea, cough, choking, chest tightness, shortness of breath, wheezing and stridor. Cardiovascular: Negative for chest pain, palpitations and leg swelling. Gastrointestinal: Negative for abdominal distention, abdominal pain, anal bleeding, blood in stool, constipation, diarrhea, nausea, rectal pain and vomiting. Endocrine: Negative for cold intolerance, heat intolerance, polydipsia, polyphagia and polyuria. Genitourinary: Negative for decreased urine volume, difficulty urinating, discharge, dysuria, enuresis, flank pain, frequency, genital sores, hematuria, penile pain, penile swelling, scrotal swelling, testicular pain and urgency. Musculoskeletal: Positive for gait problem. Negative for arthralgias, back pain, joint swelling, myalgias, neck pain and neck stiffness. Skin: Negative for color change, pallor, rash and wound. Allergic/Immunologic: Negative for food allergies. Neurological: Positive for weakness. Negative for dizziness, tremors, seizures, syncope, facial asymmetry, speech difficulty, light-headedness, numbness and headaches. Lower extremity weakness. Hematological: Negative for adenopathy. Does not bruise/bleed easily. Psychiatric/Behavioral: Negative for agitation, behavioral problems, confusion, decreased concentration, dysphoric mood, hallucinations, self-injury, sleep disturbance and suicidal ideas.  The patient is not nervous/anxious and is not hyperactive. Past Medical History:        Diagnosis Date    COPD (chronic obstructive pulmonary disease) ([facility] [address]     History of blood transfusion     Hypertension        Past Surgical History:        Procedure Laterality Date    BACK SURGERY      CAROTID-SUBCLAVIAN BYPASS GRAFT      COLONOSCOPY      CORONARY ANGIOPLASTY WITH STENT PLACEMENT      NECK SURGERY      carotid endarterectomy       Medications Prior to Admission:    Prior to Admission medications    Medication Sig Start Date End Date Taking? Authorizing Provider   metoprolol succinate (TOPROL XL) 25 MG extended release tablet Take 25 mg by mouth daily   Yes Historical Provider, MD   ferrous sulfate (IRON 325) 325 (65 Fe) MG tablet Take 325 mg by mouth daily (with breakfast)   Yes Historical Provider, MD   azithromycin (ZITHROMAX) 250 MG tablet Take 2 tabs po on day one.   Take 1 tab po daily on days 2-5. 3/15/17  Yes Nemesio Schmidt PA-C   clopidogrel (PLAVIX) 75 MG tablet Take 75 mg by mouth daily   Yes Historical Provider, MD   morphine (MSIR) 15 MG tablet Take 15 mg by mouth every 6 hours as needed for Pain   Yes Historical Provider, MD   oxyCODONE-acetaminophen (PERCOCET) 5-325 MG per tablet Take 1 tablet by mouth every 4 hours as needed for Pain   Yes Historical Provider, MD   aspirin 81 MG tablet Take 81 mg by mouth daily   Yes Historical Provider, MD   tamsulosin (FLOMAX) 0.4 MG capsule Take 0.4 mg by mouth daily   Yes Historical Provider, MD   simvastatin (ZOCOR) 80 MG tablet Take 80 mg by mouth nightly   Yes Historical Provider, MD   lisinopril (PRINIVIL;ZESTRIL) 20 MG tablet Take 20 mg by mouth daily   Yes Historical Provider, MD   senna-docusate (PERICOLACE) 8.6-50 MG per tablet Take 1 tablet by mouth 3 times daily   Yes Historical Provider, MD   omeprazole (PRILOSEC) 20 MG capsule Take 20 mg by mouth daily   Yes Historical Provider, MD   sildenafil (VIAGRA) 100 MG tablet Take 100 mg by mouth once a week Yes Historical Provider, MD   nystatin-triamcinolone [facility]) 777654-4.1 UNIT/GM-% ointment Apply topically 2 times daily. 8/1/18   Sterling Dee PA-C       Allergies:    Patient has no known allergies. Social History:    TOBACCO:   reports that he has been smoking cigarettes. He has a 32.50 pack-year smoking history. He has never used smokeless tobacco.    ETOH:   reports current alcohol use. Family History:    No family history on file. Objective:   /60   Pulse 101   Temp 98.6 °F (37 °C) (Oral)   Resp 18   Ht 5' 8\" (1.727 m)   Wt 116 lb (52.6 kg)   SpO2 100%   BMI 17.64 kg/m²       Intake/Output Summary (Last 24 hours) at 9/1/2021 0713  Last data filed at 9/1/2021 0354  Gross per 24 hour   Intake 1067.1 ml   Output 400 ml   Net 667.1 ml       Physical Exam  Vitals and nursing note reviewed. Constitutional:       General: He is not in acute distress. Appearance: Normal appearance. He is not ill-appearing, toxic-appearing or diaphoretic. Comments: Malnourished   HENT:      Head: Normocephalic and atraumatic. Right Ear: External ear normal.      Left Ear: External ear normal.      Nose: Nose normal. No congestion or rhinorrhea. Mouth/Throat:      Mouth: Mucous membranes are moist.      Pharynx: Oropharynx is clear. No oropharyngeal exudate or posterior oropharyngeal erythema. Eyes:      General: No scleral icterus. Right eye: No discharge. Left eye: No discharge. Extraocular Movements: Extraocular movements intact. Conjunctiva/sclera: Conjunctivae normal.      Pupils: Pupils are equal, round, and reactive to light. Neck:      Vascular: No carotid bruit. Cardiovascular:      Rate and Rhythm: Normal rate and regular rhythm. Pulses: Normal pulses. Heart sounds: Normal heart sounds. No murmur heard. No friction rub. No gallop. Pulmonary:      Effort: Pulmonary effort is normal. No respiratory distress.       Breath sounds: Normal breath sounds. No stridor. No wheezing, rhonchi or rales. Chest:      Chest wall: No tenderness. Abdominal:      General: Abdomen is flat. Bowel sounds are normal. There is distension. Palpations: Abdomen is soft. There is no mass. Tenderness: There is no abdominal tenderness. There is no right CVA tenderness, left CVA tenderness, guarding or rebound. Hernia: No hernia is present. Comments: Mildly distended and tympanic. Musculoskeletal:         General: No swelling, tenderness, deformity or signs of injury. Normal range of motion. Cervical back: Normal range of motion and neck supple. No rigidity or tenderness. Right lower leg: No edema. Left lower leg: No edema. Lymphadenopathy:      Cervical: No cervical adenopathy. Skin:     General: Skin is warm. Coloration: Skin is pale. Skin is not jaundiced. Findings: No bruising, erythema, lesion or rash. Neurological:      General: No focal deficit present. Mental Status: He is alert and oriented to person, place, and time. Mental status is at baseline. Cranial Nerves: No cranial nerve deficit. Sensory: No sensory deficit. Motor: No weakness. Coordination: Coordination normal.      Gait: Gait normal.      Deep Tendon Reflexes: Reflexes normal.   Psychiatric:         Mood and Affect: Mood normal.         Behavior: Behavior normal.         Thought Content:  Thought content normal.         Judgment: Judgment normal.     :    Medications:    sodium chloride flush  10 mL IntraVENous 2 times per day    enoxaparin  40 mg SubCUTAneous Daily    nicotine  1 patch TransDERmal Daily    metoprolol succinate  25 mg Oral Daily    pantoprazole  40 mg Oral QAM AC    sennosides-docusate sodium  1 tablet Oral TID    tamsulosin  0.4 mg Oral Daily    atorvastatin  40 mg Oral Nightly       Continuous Infusions:   sodium chloride      sodium chloride      lactated ringers 100 mL/hr at 08/31/21 7775 PRN Meds:sodium chloride, sodium chloride flush, sodium chloride, ondansetron **OR** ondansetron, polyethylene glycol, acetaminophen **OR** acetaminophen, oxyCODONE-acetaminophen, ipratropium-albuterol, morphine    Data:    CBC:   Recent Labs     08/31/21  1545 08/31/21 2039 09/01/21  0537   WBC 7.2  --  12.2*   RBC 3.41*  --  3.98*   HGB 5.7* 7.5* 7.6*   HCT 23.1* 28.6* 28.8*   MCV 67.7*  --  72.4*     --  162       BMP:   Recent Labs     08/31/21  1545      K 4.3      CO2 25   BUN 16   CREATININE 1.1       PT/INR:   Recent Labs     08/31/21  1545   INR 1.03       LIVER PROFILE:   Recent Labs     08/31/21  1545   AST 11   ALT <5*   BILIDIR <0.2   BILITOT 0.4   ALKPHOS 77      Results for Charbel Roberts (MRN [id number]) as of 9/1/2021 06:57   Ref. Range 8/31/2021 20:39   Ferritin Latest Ref Range: 22 - 322 ng/mL 27   Iron Latest Ref Range: 65 - 195 ug/dL 209 (H)   TIBC Latest Ref Range: 171 - 450 ug/dL 419     Results for Charbel Roberts (MRN [id number]) as of 9/1/2021 06:57   Ref. Range 8/31/2021 20:20   Folate Latest Ref Range: 4.8 - 24.2 ng/mL 7.1   Vitamin B-12 Latest Ref Range: 211 - 911 pg/mL 299     Results for Charbel Roberts (MRN [id number]) as of 9/1/2021 06:57   Ref. Range 8/31/2021 15:55   Pro-BNP Latest Ref Range: 0.0 - 900.0 pg/mL 334.5   Troponin T Latest Units: ng/ml < 0.010     Results for Charbel Roberts (MRN [id number]) as of 9/1/2021 06:57   Ref. Range 8/31/2021 15:55 8/31/2021 20:39   Lactic Acid, Sepsis Latest Ref Range: 0.5 - 1.9 mmol/L 2.8 (H) 3.2 (H)       ABG. None. URINALYSIS. None. SEROLOGY  None. TUMOR MARKERS. Results for Rolene Records (MRN [id number]) as of 9/1/2021 06:57   Ref. Range 5/13/2016 00:00 8/23/2016 00:00 9/2/2018 21:07 8/31/2021 20:20   Prostatic Specific Ag Latest Ref Range: 0.00 - 1.00 ng/mL 12.20 10.2 9.17 (H) 6.28 (H)     MICROBIOLOGY   None. HISTOPATHOLOGY. None. TOXICOLOGY. None.     ENDOSCOPE STUDIES. None. PROCEDURES. None. RADIOLOGY. None. ASSESSMENT AND  PLAN. 1.NEUROVASCULAR. H/o spinal cord injury. 2.PULMONARY. COPD w/o exacerbation. 3.CARDIOVASCULAR. Symptomatic orthostatic hypotension-IVF w/ RL and blood transfusion. CAD without angina. 4.GI.     Abdominal distention-CT abdomen pelvis with contrast.       GI consult for EGD and colonoscopy studies for anemia work-up. 5.RENAL AND ELECTROLYTES. Acute lactic acidosis-serum lactate 2.8-IVF w/ LR. 6.ENDO.     TSH and A1c check. 7.MUSCULOSKELETAL. Ambulatory dysfunction with chronic bilateral lower extremity weakness -     uses a wheelchair. 8.LIFE STYLE. Chronic tobacco use disorder-nicotine patch. 9.HEM-ONC. Symptomatic microcytic anemia-Hb 5.7, MCV 67. 7-PRBC transfusion x2 units. Serum iron, U02 and folic acid check. 10.UROLOGY. H/o elevated PSA-repeat    11. NUTRITION. Severe protein energy malnutrition w/ a BMI 17.64 .     12. DISPO. Planned d/c to home vs rehab soon.         Electronically signed by Estela Contreras MD on 9/1/2021 at 7:13 AM

## 2021-09-01 NOTE — PROGRESS NOTES
Pt admitted to  4K02 from ED. Complaints: fatigue. IV site free of s/s of infection or infiltration. Vital signs obtained. Assessment and data collection initiated. Two nurse skin assessment performed by Teresa Saldivar and Nasrin Bautista RN. Oriented to room. Policies and procedures for  explained. All questions answered with no further questions at this time. Fall prevention and safety brochure discussed with patient. Bed alarm on. Call light in reach. Would you like your Primary Care Physician notified?   no

## 2021-09-01 NOTE — ED NOTES
Patient transferred to [facility] room 002 nurse informed.       Equilla Cranker  08/31/21 3903

## 2021-09-01 NOTE — CONSULTS
Consult History & Physical      Patient:  Trista Guallpa  YOB: 1950  MRN: [id number]     Acct: [de-identified]    Chief Complaint:    Chief Complaint   Patient presents with    Hypotension    Fatigue       Date of Service: Pt seen/examined in consultation on 9/1/2021    History Of Present Illness:      66 y.o. male who we are asked to see/evaluate by Mat Perez MD for medical management of symptomatic anemia, abdominal distention. Patient is very drowsy and dozes off during evaluation. HPI from patient, chart review, and RN. He came to the ED yesterday for low Hgb noted on outside labs by the [place]. He was instructed to go to the ED approximately 3 weeks ago for the same reason, but chose not to come. He has been taking iron supplements for the anemia. He has a history of chronic anemia, denies history of blood transfusions. Initial Hgb 5.7, received 1 unit PRBC, most recent Hgb 7.6. He is on aspirin and Plavix for a history of CAD s/p PCI. He last had them yesterday. In the ED, he was noted to be hypotensive. He denies abdominal pain, nausea, vomiting, diarrhea, constipation, melena, and hematochezia. His last bowel movement was 3-4 days ago. He is on chronic narcotic analgesics (Percocet & Morphine) for chronic pain. He is not on medication to prevent/help constipation. He denies SOB, lightheadedness, and dizziness. He was noted to be hypoxic when sleeping and is currently on nasal cannula. Denies history of GI bleeding. He has never had abdominal surgeries, EGD, or colonoscopy. He has mild, soft abdominal distention that he cannot say if this is chronic or new.     Past Medical History:    Past Medical History:   Diagnosis Date    COPD (chronic obstructive pulmonary disease) ([facility] [address]     Coronary artery disease involving native coronary artery of native heart     History of blood transfusion     Hypertension        Home Medications:  Prior to Admission medications    Medication Sig Start Date End Date Taking? Authorizing Provider   metoprolol succinate (TOPROL XL) 25 MG extended release tablet Take 25 mg by mouth daily   Yes Historical Provider, MD   ferrous sulfate (IRON 325) 325 (65 Fe) MG tablet Take 325 mg by mouth daily (with breakfast)   Yes Historical Provider, MD   azithromycin (ZITHROMAX) 250 MG tablet Take 2 tabs po on day one. Take 1 tab po daily on days 2-5. 3/15/17  Yes Concepción Nuñez PA-C   clopidogrel (PLAVIX) 75 MG tablet Take 75 mg by mouth daily   Yes Historical Provider, MD   morphine (MSIR) 15 MG tablet Take 15 mg by mouth every 6 hours as needed for Pain   Yes Historical Provider, MD   oxyCODONE-acetaminophen (PERCOCET) 5-325 MG per tablet Take 1 tablet by mouth every 4 hours as needed for Pain   Yes Historical Provider, MD   aspirin 81 MG tablet Take 81 mg by mouth daily   Yes Historical Provider, MD   tamsulosin (FLOMAX) 0.4 MG capsule Take 0.4 mg by mouth daily   Yes Historical Provider, MD   simvastatin (ZOCOR) 80 MG tablet Take 80 mg by mouth nightly   Yes Historical Provider, MD   lisinopril (PRINIVIL;ZESTRIL) 20 MG tablet Take 20 mg by mouth daily   Yes Historical Provider, MD   senna-docusate (PERICOLACE) 8.6-50 MG per tablet Take 1 tablet by mouth 3 times daily   Yes Historical Provider, MD   omeprazole (PRILOSEC) 20 MG capsule Take 20 mg by mouth daily   Yes Historical Provider, MD   sildenafil (VIAGRA) 100 MG tablet Take 100 mg by mouth once a week   Yes Historical Provider, MD   nystatin-triamcinolone (MYCOLOG) 239167-1.1 UNIT/GM-% ointment Apply topically 2 times daily. 8/1/18   Rachael Tracey PA-C       Surgical History:  Past Surgical History:   Procedure Laterality Date    BACK SURGERY      CAROTID-SUBCLAVIAN BYPASS GRAFT      COLONOSCOPY      CORONARY ANGIOPLASTY WITH STENT PLACEMENT      NECK SURGERY      carotid endarterectomy       Family History:  No family history on file.     Past GI History:  Chronic anemia    Allergies:  Patient has no known allergies. Social History:   TOBACCO:   reports that he has been smoking cigarettes. He has a 32.50 pack-year smoking history. He has never used smokeless tobacco.  ETOH:   reports current alcohol use. Review Of Systems  GENERAL: No fever, chills or weight loss. EYES:  No  blurred vision, double vision   CARDIOVASCULAR: No chest pain or palpitations. RESPIRATORY:  No dyspnea or cough. GI:  See HPI  MUSCULOSKELETAL: No new painful or swollen joints or myalgias. :   No dysuria or hematuria. SKIN:  No rashes or jaundice. NEUROLOGIC:  No headaches or seizures, numbness or tingling of arms, or legs. PSYCH:  No anxiety or depression. ENDOCRINE:  No polyuria or polydipsia. BLOOD:  +anemia +blood transfusion      PHYSICAL EXAM:  BP (!) 96/54   Pulse 88   Temp 99.3 °F (37.4 °C) (Oral)   Resp 17   Ht 5' 8\" (1.727 m)   Wt 116 lb (52.6 kg)   SpO2 94%   BMI 17.64 kg/m²     General appearance: Chronically ill appearing male, malnourished  HEENT: Normal cephalic, atraumatic without obvious deformity. Pupils equal, round, and reactive to light. Neck: Supple, with full range of motion. No jugular venous distention. Trachea midline. Respiratory:  Normal respiratory effort. Clear to auscultation, bilaterally without Rales/Wheezes/Rhonchi. Cardiovascular: Regular rate and rhythm without murmurs, rubs or gallops. Abdomen: Soft, non-tender, softly distended with active bowel sounds. Musculoskeletal: No clubbing, cyanosis or edema bilaterally. Metatarsal amputations noted to bilateral feet. Skin: Pale, warm, dry. No rashes or lesions.   Psychiatric: Alert and oriented, thought content appropriate, normal insight  Rectal: refused    Labs:   Recent Labs     09/01/21  0537   WBC 12.2*   HGB 7.6*   HCT 28.8*        Recent Labs     08/31/21  1545      K 4.3      CO2 25   BUN 16   CREATININE 1.1   CALCIUM 8.6     Recent Labs     08/31/21  1545   AST 11   ALT <5*   BILIDIR <0.2 BILITOT 0.4   ALKPHOS 77     Recent Labs     08/31/21  1545   INR 1.03      Ref. Range 8/31/2021 20:39   Ferritin Latest Ref Range: 22 - 322 ng/mL 27   Iron Latest Ref Range: 65 - 195 ug/dL 209 (H)   TIBC Latest Ref Range: 171 - 450 ug/dL 419      Ref. Range 8/31/2021 20:20   Folate Latest Ref Range: 4.8 - 24.2 ng/mL 7.1   Vitamin B-12 Latest Ref Range: 211 - 911 pg/mL 299       Radiology:   CXR 08/31/21      Impression   1. There are linear parenchymal densities demonstrated overlying the left mid to upper lung zone. Cannot exclude an underlying pulmonary lesion. Further characterization with chest CT is recommended.       2. There are nonspecific distended partially visualized gas-filled bowel loops overlying the upper abdomen which are incompletely visualized. Further evaluation with abdominal radiograph can be obtained if clinically indicated. Code Status: Full Code    ASSESSMENT:  1. Symptomatic anemia- s/p 1 unit PRBC  2. Acute hypotension secondary to #1  3. Soft abdominal distention- unclear if this is chronic or new  4. Suspect chronic opioid induced constipation- no BM for 3-4 days  5. Chronic opioid use for chronic pain secondary to spinal cord injury- on Percocet & Morphine  6. CAD s/p PCI- on aspirin & Plavix  7. COPD, not exacerbated  8.  H/O HTN  9. H/O spinal cord injury    PLAN:     Monitor H & H, transfuse prn   Switch PO PPI to IVP PPI BID   NPO   Change CT A/P to no oral contrast, IV only   Change Senna to 2 tabs nightly   Colace BID   Miralax BID   Nursing to monitor stool output & document   I & O   EGD with risks & benefits discussed, patient agreeable to proceed   Will schedule for urgent EGD today at 1500 with Dr. Kristin Lindquist care per primary team  Will follow       Case reviewed and impression/plan reviewed in collaboration with Dr. Peyton Reaves  Electronically signed by JUAN CARLOS Doshi - CNP on 9/1/2021 at 9:57 AM    [facility]  Thank you for the consultation.

## 2021-09-02 ENCOUNTER — APPOINTMENT (OUTPATIENT)
Dept: CT IMAGING | Age: 71
DRG: 811 | End: 2021-09-02
Payer: OTHER GOVERNMENT

## 2021-09-02 VITALS
OXYGEN SATURATION: 100 % | RESPIRATION RATE: 14 BRPM | SYSTOLIC BLOOD PRESSURE: 82 MMHG | DIASTOLIC BLOOD PRESSURE: 43 MMHG

## 2021-09-02 LAB
ANION GAP SERPL CALCULATED.3IONS-SCNC: 9 MEQ/L (ref 8–16)
BUN BLDV-MCNC: 16 MG/DL (ref 7–22)
CALCIUM SERPL-MCNC: 8.6 MG/DL (ref 8.5–10.5)
CEA: 3.5 NG/ML (ref 0–5)
CHLORIDE BLD-SCNC: 103 MEQ/L (ref 98–111)
CO2: 24 MEQ/L (ref 23–33)
CREAT SERPL-MCNC: 1 MG/DL (ref 0.4–1.2)
ERYTHROCYTE [DISTWIDTH] IN BLOOD BY AUTOMATED COUNT: 26.2 % (ref 11.5–14.5)
ERYTHROCYTE [DISTWIDTH] IN BLOOD BY AUTOMATED COUNT: 63.3 FL (ref 35–45)
GFR SERPL CREATININE-BSD FRML MDRD: 73 ML/MIN/1.73M2
GLUCOSE BLD-MCNC: 96 MG/DL (ref 70–108)
HCT VFR BLD CALC: 25.2 % (ref 42–52)
HCT VFR BLD CALC: 30.3 % (ref 42–52)
HEMOGLOBIN: 6.6 GM/DL (ref 14–18)
HEMOGLOBIN: 8.8 GM/DL (ref 14–18)
IRON: 13 UG/DL (ref 65–195)
MCH RBC QN AUTO: 19 PG (ref 26–33)
MCHC RBC AUTO-ENTMCNC: 26.2 GM/DL (ref 32.2–35.5)
MCV RBC AUTO: 72.4 FL (ref 80–94)
MRSA SCREEN: NORMAL
PLATELET # BLD: 154 THOU/MM3 (ref 130–400)
PMV BLD AUTO: 10.5 FL (ref 9.4–12.4)
POTASSIUM SERPL-SCNC: 4.4 MEQ/L (ref 3.5–5.2)
RBC # BLD: 3.48 MILL/MM3 (ref 4.7–6.1)
SODIUM BLD-SCNC: 136 MEQ/L (ref 135–145)
WBC # BLD: 11 THOU/MM3 (ref 4.8–10.8)

## 2021-09-02 PROCEDURE — 82378 CARCINOEMBRYONIC ANTIGEN: CPT

## 2021-09-02 PROCEDURE — 6370000000 HC RX 637 (ALT 250 FOR IP): Performed by: STUDENT IN AN ORGANIZED HEALTH CARE EDUCATION/TRAINING PROGRAM

## 2021-09-02 PROCEDURE — 6370000000 HC RX 637 (ALT 250 FOR IP): Performed by: NURSE PRACTITIONER

## 2021-09-02 PROCEDURE — 2060000000 HC ICU INTERMEDIATE R&B

## 2021-09-02 PROCEDURE — 6370000000 HC RX 637 (ALT 250 FOR IP): Performed by: INTERNAL MEDICINE

## 2021-09-02 PROCEDURE — 80048 BASIC METABOLIC PNL TOTAL CA: CPT

## 2021-09-02 PROCEDURE — 85027 COMPLETE CBC AUTOMATED: CPT

## 2021-09-02 PROCEDURE — 85018 HEMOGLOBIN: CPT

## 2021-09-02 PROCEDURE — 71250 CT THORAX DX C-: CPT

## 2021-09-02 PROCEDURE — 2580000003 HC RX 258: Performed by: INTERNAL MEDICINE

## 2021-09-02 PROCEDURE — 85014 HEMATOCRIT: CPT

## 2021-09-02 PROCEDURE — 2580000003 HC RX 258: Performed by: STUDENT IN AN ORGANIZED HEALTH CARE EDUCATION/TRAINING PROGRAM

## 2021-09-02 PROCEDURE — 6360000002 HC RX W HCPCS: Performed by: INTERNAL MEDICINE

## 2021-09-02 PROCEDURE — 6360000002 HC RX W HCPCS: Performed by: STUDENT IN AN ORGANIZED HEALTH CARE EDUCATION/TRAINING PROGRAM

## 2021-09-02 PROCEDURE — 83540 ASSAY OF IRON: CPT

## 2021-09-02 PROCEDURE — 36415 COLL VENOUS BLD VENIPUNCTURE: CPT

## 2021-09-02 PROCEDURE — 99233 SBSQ HOSP IP/OBS HIGH 50: CPT | Performed by: INTERNAL MEDICINE

## 2021-09-02 RX ORDER — LACTULOSE 10 G/15ML
20 SOLUTION ORAL 2 TIMES DAILY
Status: COMPLETED | OUTPATIENT
Start: 2021-09-02 | End: 2021-09-02

## 2021-09-02 RX ORDER — LISINOPRIL 20 MG/1
20 TABLET ORAL DAILY
Status: DISCONTINUED | OUTPATIENT
Start: 2021-09-02 | End: 2021-09-04 | Stop reason: HOSPADM

## 2021-09-02 RX ORDER — SODIUM CHLORIDE 9 MG/ML
INJECTION, SOLUTION INTRAVENOUS PRN
Status: DISCONTINUED | OUTPATIENT
Start: 2021-09-02 | End: 2021-09-04 | Stop reason: HOSPADM

## 2021-09-02 RX ORDER — SODIUM CHLORIDE 9 MG/ML
INJECTION, SOLUTION INTRAVENOUS CONTINUOUS
Status: DISCONTINUED | OUTPATIENT
Start: 2021-09-02 | End: 2021-09-02

## 2021-09-02 RX ORDER — MORPHINE SULFATE 30 MG/1
30 CAPSULE, EXTENDED RELEASE ORAL 4 TIMES DAILY
Status: ON HOLD | COMMUNITY
End: 2021-09-04 | Stop reason: HOSPADM

## 2021-09-02 RX ORDER — SIMVASTATIN 20 MG
20 TABLET ORAL NIGHTLY
COMMUNITY

## 2021-09-02 RX ORDER — LISINOPRIL 20 MG/1
20 TABLET ORAL DAILY
Status: DISCONTINUED | OUTPATIENT
Start: 2021-09-02 | End: 2021-09-02

## 2021-09-02 RX ADMIN — METOPROLOL SUCCINATE 25 MG: 25 TABLET, FILM COATED, EXTENDED RELEASE ORAL at 11:16

## 2021-09-02 RX ADMIN — LACTULOSE 20 G: 20 SOLUTION ORAL at 21:38

## 2021-09-02 RX ADMIN — FOLIC ACID 1 MG: 1 TABLET ORAL at 10:10

## 2021-09-02 RX ADMIN — PANTOPRAZOLE SODIUM 40 MG: 40 TABLET, DELAYED RELEASE ORAL at 05:45

## 2021-09-02 RX ADMIN — SODIUM CHLORIDE, PRESERVATIVE FREE 10 ML: 5 INJECTION INTRAVENOUS at 10:11

## 2021-09-02 RX ADMIN — SODIUM CHLORIDE, PRESERVATIVE FREE 10 ML: 5 INJECTION INTRAVENOUS at 21:45

## 2021-09-02 RX ADMIN — IRON SUCROSE 200 MG: 20 INJECTION, SOLUTION INTRAVENOUS at 10:11

## 2021-09-02 RX ADMIN — LISINOPRIL 20 MG: 20 TABLET ORAL at 21:36

## 2021-09-02 RX ADMIN — ATORVASTATIN CALCIUM 40 MG: 40 TABLET, FILM COATED ORAL at 19:57

## 2021-09-02 RX ADMIN — POLYETHYLENE GLYCOL 3350 17 G: 17 POWDER, FOR SOLUTION ORAL at 19:56

## 2021-09-02 RX ADMIN — DOCUSATE SODIUM 50 MG AND SENNOSIDES 8.6 MG 2 TABLET: 8.6; 5 TABLET, FILM COATED ORAL at 19:56

## 2021-09-02 RX ADMIN — POLYETHYLENE GLYCOL 3350 17 G: 17 POWDER, FOR SOLUTION ORAL at 10:09

## 2021-09-02 RX ADMIN — DOCUSATE SODIUM 100 MG: 100 CAPSULE ORAL at 10:09

## 2021-09-02 RX ADMIN — Medication 100 MG: at 10:09

## 2021-09-02 RX ADMIN — CEFTRIAXONE SODIUM 1000 MG: 1 INJECTION, POWDER, FOR SOLUTION INTRAMUSCULAR; INTRAVENOUS at 04:15

## 2021-09-02 RX ADMIN — PANTOPRAZOLE SODIUM 40 MG: 40 TABLET, DELAYED RELEASE ORAL at 16:23

## 2021-09-02 RX ADMIN — LACTULOSE 20 G: 20 SOLUTION ORAL at 13:40

## 2021-09-02 RX ADMIN — SODIUM CHLORIDE: 9 INJECTION, SOLUTION INTRAVENOUS at 04:00

## 2021-09-02 RX ADMIN — TAMSULOSIN HYDROCHLORIDE 0.4 MG: 0.4 CAPSULE ORAL at 10:10

## 2021-09-02 RX ADMIN — DOCUSATE SODIUM 100 MG: 100 CAPSULE ORAL at 19:56

## 2021-09-02 ASSESSMENT — PAIN SCALES - GENERAL
PAINLEVEL_OUTOF10: 0

## 2021-09-02 ASSESSMENT — COPD QUESTIONNAIRES: CAT_SEVERITY: NO INTERVAL CHANGE

## 2021-09-02 ASSESSMENT — LIFESTYLE VARIABLES: SMOKING_STATUS: 1

## 2021-09-02 NOTE — ANESTHESIA PRE PROCEDURE
Department of Anesthesiology  Preprocedure Note       Name:  Dinh Garcias   Age:  66 y.o.  :  1950                                          MRN:  [id number]         Date:  2021      Surgeon: Greta Andrade):  Nuala Sacks, MD    Procedure: Procedure(s):  EGD ESOPHAGOGASTRODUODENOSCOPY    Medications prior to admission:   Prior to Admission medications    Medication Sig Start Date End Date Taking? Authorizing Provider   metoprolol succinate (TOPROL XL) 25 MG extended release tablet Take 25 mg by mouth daily   Yes Historical Provider, MD   ferrous sulfate (IRON 325) 325 (65 Fe) MG tablet Take 325 mg by mouth daily (with breakfast)   Yes Historical Provider, MD   azithromycin (ZITHROMAX) 250 MG tablet Take 2 tabs po on day one. Take 1 tab po daily on days 2-5. 3/15/17  Yes Warren Godoy PA-C   clopidogrel (PLAVIX) 75 MG tablet Take 75 mg by mouth daily   Yes Historical Provider, MD   morphine (MSIR) 15 MG tablet Take 15 mg by mouth every 6 hours as needed for Pain   Yes Historical Provider, MD   oxyCODONE-acetaminophen (PERCOCET) 5-325 MG per tablet Take 1 tablet by mouth every 4 hours as needed for Pain   Yes Historical Provider, MD   aspirin 81 MG tablet Take 81 mg by mouth daily   Yes Historical Provider, MD   tamsulosin (FLOMAX) 0.4 MG capsule Take 0.4 mg by mouth daily   Yes Historical Provider, MD   simvastatin (ZOCOR) 80 MG tablet Take 80 mg by mouth nightly   Yes Historical Provider, MD   lisinopril (PRINIVIL;ZESTRIL) 20 MG tablet Take 20 mg by mouth daily   Yes Historical Provider, MD   senna-docusate (PERICOLACE) 8.6-50 MG per tablet Take 1 tablet by mouth 3 times daily   Yes Historical Provider, MD   omeprazole (PRILOSEC) 20 MG capsule Take 20 mg by mouth daily   Yes Historical Provider, MD   sildenafil (VIAGRA) 100 MG tablet Take 100 mg by mouth once a week   Yes Historical Provider, MD   nystatin-triamcinolone (MYCOLOG) 663281-5.1 UNIT/GM-% ointment Apply topically 2 times daily. 8/1/18   Kin Haque PA-C       Current medications:    Current Facility-Administered Medications   Medication Dose Route Frequency Provider Last Rate Last Admin    cefTRIAXone (ROCEPHIN) 1000 mg IVPB in 50 mL D5W minibag  1,000 mg IntraVENous Q24H Oliva Pacheco MD   Stopped at 09/02/21 0534    0.9 % sodium chloride infusion   IntraVENous Continuous Oliva Pacheco  mL/hr at 09/02/21 0400 New Bag at 09/02/21 0400    0.9 % sodium chloride infusion   IntraVENous PRN Oliva Pacheco MD        iron sucrose (VENOFER) 200 mg in sodium chloride 0.9 % 100 mL IVPB  200 mg IntraVENous Q24H Raulito Nesbitt MD        metoprolol succinate (TOPROL XL) extended release tablet 25 mg  25 mg Oral Daily Carmelita Mcgrath MD        sennosides-docusate sodium (SENOKOT-S) 8.6-50 MG tablet 2 tablet  2 tablet Oral Nightly Vallarie Tao, APRN - CNP        polyethylene glycol (GLYCOLAX) packet 17 g  17 g Oral BID Vallarie Tao, APRN - CNP   17 g at 09/01/21 2035    docusate sodium (COLACE) capsule 100 mg  100 mg Oral BID Vallarie Burger, APRN - CNP   100 mg at 09/01/21 2035    pantoprazole (PROTONIX) tablet 40 mg  40 mg Oral BID AC Nancy Zhong MD   40 mg at 09/02/21 0545    thiamine tablet 100 mg  100 mg Oral Daily Nabil Toure, DO   100 mg at 85/34/92 1971    folic acid (FOLVITE) tablet 1 mg  1 mg Oral Daily Nabil Toure, DO   1 mg at 09/01/21 1803    0.9 % sodium chloride infusion   IntraVENous PRN Moraima Rausch DO        sodium chloride flush 0.9 % injection 10 mL  10 mL IntraVENous 2 times per day Davina Garcia MD   10 mL at 09/01/21 2035    sodium chloride flush 0.9 % injection 10 mL  10 mL IntraVENous PRN Davina Garcia MD        0.9 % sodium chloride infusion  25 mL IntraVENous PRN Davina Garcia MD        [Held by provider] enoxaparin (LOVENOX) injection 40 mg  40 mg SubCUTAneous Daily Davina Garcia MD        ondansetron (ZOFRAN-ODT) disintegrating tablet 4 mg  4 mg Oral Q8H PRN Sabina Sagastume MD        Or    ondansetron [facility]) injection 4 mg  4 mg IntraVENous Q6H PRN Sabina Sagastume MD        polyethylene glycol (GLYCOLAX) packet 17 g  17 g Oral Daily PRN Sabina Sagastume MD        acetaminophen (TYLENOL) tablet 650 mg  650 mg Oral Q6H PRN Sabina Sagastume MD        Or    acetaminophen (TYLENOL) suppository 650 mg  650 mg Rectal Q6H PRN Sabina Sagastume MD        nicotine (NICODERM CQ) 14 MG/24HR 1 patch  1 patch TransDERmal Daily Sabina Sagastume MD   1 patch at 09/01/21 1248    [Held by provider] oxyCODONE-acetaminophen (PERCOCET) 5-325 MG per tablet 1 tablet  1 tablet Oral Q4H PRN Sabina Sagastume MD        tamsulosin (FLOMAX) capsule 0.4 mg  0.4 mg Oral Daily Hermelindo Sesay MD        ipratropium-albuterol (DUONEB) nebulizer solution 1 ampule  1 ampule Inhalation Q4H PRN Sabina Sagastume MD        atorvastatin (LIPITOR) tablet 40 mg  40 mg Oral Nightly Sabina Sagastume MD   40 mg at 09/01/21 2035    [Held by provider] morphine (MSIR) tablet 15 mg  15 mg Oral Q6H PRN Sabina Sagastume MD           Allergies:  No Known Allergies    Problem List:    Patient Active Problem List   Diagnosis Code    Elevated PSA R97.20    Symptomatic anemia D64.9    Coronary artery disease involving native coronary artery of native heart without angina pectoris I25.10    Tobacco use disorder F17.200    Weakness of both lower extremities R29.898    Orthostatic hypotension I95.1    COPD without exacerbation (HCC) J44.9    Lactic acidosis E87.2       Past Medical History:        Diagnosis Date    COPD (chronic obstructive pulmonary disease) ([facility] [address]     Coronary artery disease involving native coronary artery of native heart     History of blood transfusion     Hypertension        Past Surgical History:        Procedure Laterality Date    BACK SURGERY      CAROTID-SUBCLAVIAN BYPASS GRAFT      COLONOSCOPY      CORONARY ANGIOPLASTY WITH STENT PLACEMENT      NECK SURGERY      carotid in the last 72 hours. Coags:   Lab Results   Component Value Date    INR 1.03 08/31/2021    APTT 24.4 08/31/2021       HCG (If Applicable): No results found for: PREGTESTUR, PREGSERUM, HCG, HCGQUANT     ABGs: No results found for: PHART, PO2ART, IPK4FCQ, CUI2EDI, BEART, K7KTQIUK     Type & Screen (If Applicable):  Lab Results   Component Value Date    LABRH POS 08/31/2021       Drug/Infectious Status (If Applicable):  No results found for: HIV, HEPCAB    COVID-19 Screening (If Applicable): No results found for: COVID19        Anesthesia Evaluation  Patient summary reviewed and Nursing notes reviewed no history of anesthetic complications:   Airway: Mallampati: II  TM distance: >3 FB   Neck ROM: full  Mouth opening: > = 3 FB Dental: normal exam         Pulmonary:   (+) COPD: no interval change,  decreased breath sounds,  current smoker          Patient did not smoke on day of surgery. Cardiovascular:    (+) hypertension: no interval change, CAD: no interval change, CABG/stent: no interval change,       ECG reviewed  Rhythm: regular  Rate: normal                    Neuro/Psych:                ROS comment: Weakness lower extremities GI/Hepatic/Renal:   (+) GERD: no interval change,           Endo/Other:    (+) blood dyscrasia: anemia:., .          Pt had no PAT visit       Abdominal:             Vascular: Other Findings:             Anesthesia Plan      MAC and general     ASA 3             Anesthetic plan and risks discussed with patient. Plan discussed with attending.     Attending anesthesiologist reviewed and agrees with Preprocedure content              Shay Spence RN   9/2/2021

## 2021-09-02 NOTE — CARE COORDINATION
DISCHARGE PLANNING UPDATE    Barriers to Discharge: WBC 11, H 6.6; monitor, plans PRBC transfusion. Await final urine culture; monitor. IV Rocephin, IV Iron, PO Lactulose continued. Hematology. EGD without reason for profound anemia; GI signed off  Imagin/2 CT Chest  1. Cavitary masslike lesion in the left apex measuring 2.1 cm. This may be infectious or neoplastic. 2. Right lower lobe airspace infiltrates and bilateral pleural effusions. 3. Mucoid appearing material in the trachea and right mainstem bronchus.    Discharge Plan:   Client prefers plans home doreen Thao thru [place] (nursing only);  referral

## 2021-09-02 NOTE — PROGRESS NOTES
[address] [facility]  Hematology/ Oncology Progress Note     Pt Name: Jovany Trujillo  MRN: [id number]  [id number]  YOB: 1950  Admit Date: 8/31/2021  3:24 PM  Date of evaluation: 9/2/2021  Primary Care Physician: Calvin Schaffer MD   [id number]       SUBJECTIVE: Feeling OK. HGB dropped to 6.6 today. OBJECTIVE    Physical  VITALS:  BP (!) 117/53   Pulse 72   Temp 98.5 °F (36.9 °C) (Oral)   Resp 16   Ht 5' 8\" (1.727 m)   Wt 117 lb 3.2 oz (53.2 kg)   SpO2 97%   BMI 17.82 kg/m²   CONSTITUTIONAL:  awake  EYES:  ENT:  normocepalic, without obvious abnormality  NECK:  supple, symmetrical, trachea midline  HEMATOLOGIC/LYMPHATICS:  no cervical lymphadenopathy and no supraclavicular lymphadenopathy  LUNGS:  Clear. CARDIOVASCULAR:  normal apical pulses  ABDOMEN:  Soft   EXTREMITIES: No edema.   NEUROLOGIC: Non Focal.  Data  Labs:  General Labs:    CBC:   Lab Results   Component Value Date    WBC 11.0 09/02/2021    RBC 3.48 09/02/2021    HGB 6.6 09/02/2021    HCT 25.2 09/02/2021    MCV 72.4 09/02/2021    MCH 19.0 09/02/2021    MCHC 26.2 09/02/2021    RDW 13.8 03/15/2017     09/02/2021    MPV 10.5 09/02/2021     IRON:    Lab Results   Component Value Date    IRON 13 09/02/2021     Current Medications  Current Facility-Administered Medications: cefTRIAXone (ROCEPHIN) 1000 mg IVPB in 50 mL D5W minibag, 1,000 mg, IntraVENous, Q24H  0.9 % sodium chloride infusion, , IntraVENous, Continuous  0.9 % sodium chloride infusion, , IntraVENous, PRN  metoprolol succinate (TOPROL XL) extended release tablet 25 mg, 25 mg, Oral, Daily  sennosides-docusate sodium (SENOKOT-S) 8.6-50 MG tablet 2 tablet, 2 tablet, Oral, Nightly  polyethylene glycol (GLYCOLAX) packet 17 g, 17 g, Oral, BID  docusate sodium (COLACE) capsule 100 mg, 100 mg, Oral, BID  pantoprazole (PROTONIX) tablet 40 mg, 40 mg, Oral, BID AC  thiamine tablet 100 mg, 100 mg, Oral, Daily  folic acid (FOLVITE) tablet 1 mg, 1 mg, Oral, Daily  0.9 % sodium chloride infusion, , IntraVENous, PRN  sodium chloride flush 0.9 % injection 10 mL, 10 mL, IntraVENous, 2 times per day  sodium chloride flush 0.9 % injection 10 mL, 10 mL, IntraVENous, PRN  0.9 % sodium chloride infusion, 25 mL, IntraVENous, PRN  [Held by provider] enoxaparin (LOVENOX) injection 40 mg, 40 mg, SubCUTAneous, Daily  ondansetron (ZOFRAN-ODT) disintegrating tablet 4 mg, 4 mg, Oral, Q8H PRN **OR** ondansetron (ZOFRAN) injection 4 mg, 4 mg, IntraVENous, Q6H PRN  polyethylene glycol (GLYCOLAX) packet 17 g, 17 g, Oral, Daily PRN  acetaminophen (TYLENOL) tablet 650 mg, 650 mg, Oral, Q6H PRN **OR** acetaminophen (TYLENOL) suppository 650 mg, 650 mg, Rectal, Q6H PRN  nicotine (NICODERM CQ) 14 MG/24HR 1 patch, 1 patch, TransDERmal, Daily  [Held by provider] oxyCODONE-acetaminophen (PERCOCET) 5-325 MG per tablet 1 tablet, 1 tablet, Oral, Q4H PRN  tamsulosin (FLOMAX) capsule 0.4 mg, 0.4 mg, Oral, Daily  ipratropium-albuterol (DUONEB) nebulizer solution 1 ampule, 1 ampule, Inhalation, Q4H PRN  atorvastatin (LIPITOR) tablet 40 mg, 40 mg, Oral, Nightly  [Held by provider] morphine (MSIR) tablet 15 mg, 15 mg, Oral, Q6H PRN    ASSESSMENT AND PLAN  Patient Active Problem List:     Elevated PSA     Symptomatic anemia     Coronary artery disease involving native coronary artery of native heart without angina pectoris     Tobacco use disorder     Weakness of both lower extremities     Orthostatic hypotension     COPD without exacerbation (HCC)     Lactic acidosis    A 1 Severe Microcytic Anemia. Repeat Iron low. 1 Unit of PRBC has been ordered. 2 CAD on ASA and Plavix. 3 Constipation. Plan: IV Venofer today. Colonoscopy today. Iron deficiency either due to GI bleeding from colon and from ASA and Plavix.         July Maxwell, IVIS,CWS  8:16 AM  9/2/2021

## 2021-09-02 NOTE — FLOWSHEET NOTE
09/01/21 2145   Provider Notification   Reason for Communication Review case   Provider Name Dr. Tere Ortega   Provider Notification Physician   Method of Communication Secure Message   Response No new orders   Notification Time 2137   Called to clarify of colonoscopy tomorrow afternoon. States that if it is d/c'd it would be for a reason. So do not give bowel prep.

## 2021-09-02 NOTE — SIGNIFICANT EVENT
Patient admitted for evaluation of profound anemia. S/p EDG with no significant findings. S/p PRBC x1 with Hg level increasing from 5.7 to 7.6     Notified by RN that Hg 6.6 this AM.    Ordered PRBC x1 with recheck of Hg post-transfusion.      Belinda Norwood MD  PGY-2
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
## 2021-09-02 NOTE — PROGRESS NOTES
Pt Name: Christen Huerta  MRN: [id number]  [id number]  YOB: 1950  Admit Date: 8/31/2021  3:24 PM  Date of evaluation: 9/2/2021  Primary Care Physician: Kendra Roberto MD   4K-02/002-A     INES DORAN No complaints. Wants to be left alone. O.     Vitals:    09/02/21 1515   BP: (!) 154/73   Pulse: 76   Resp: 17   Temp: 98.5 °F (36.9 °C)   SpO2: 98%       Body mass index is 17.82 kg/m². Awake and alert   clear to auscultation bilaterally   regular rate and rhythm   Soft and nondistended with normal BS, nontender   no cyanosis, clubbing or edema present      Current Meds    cefTRIAXone (ROCEPHIN) IV  1,000 mg IntraVENous Q24H    iron sucrose  200 mg IntraVENous Q24H    lactulose  20 g Oral BID    metoprolol succinate  25 mg Oral Daily    sennosides-docusate sodium  2 tablet Oral Nightly    polyethylene glycol  17 g Oral BID    docusate sodium  100 mg Oral BID    pantoprazole  40 mg Oral BID AC    thiamine  100 mg Oral Daily    folic acid  1 mg Oral Daily    sodium chloride flush  10 mL IntraVENous 2 times per day    [Held by provider] enoxaparin  40 mg SubCUTAneous Daily    nicotine  1 patch TransDERmal Daily    tamsulosin  0.4 mg Oral Daily    atorvastatin  40 mg Oral Nightly      sodium chloride      sodium chloride      sodium chloride       Diet: ADULT DIET; Regular    A.  66 y.o. male with chronic pain on narcotic analgesics and CAD s/p PCI 2006 on ASA and Plavix admitted 8/31/21 for profound anemia and abd distension. EGD 9/1/21 demonstrated small hiatal hernia and LA grade B esophagitis w/o reason for profound anemia. Anemia, profound  ASA and plavix use  Narcotic analgesic use  Opioid-induced constipation      P.       Pt refuses colonoscopy    Elizabeth Chun MD, MD  9:42 AM 9/3/2021

## 2021-09-02 NOTE — FLOWSHEET NOTE
Pt was in bed at the time of the visit. He said that he was very tired. Prayer was appreciated. 09/02/21 1631   Encounter Summary   Services provided to: Patient   Referral/Consult From: Rounding   Continue Visiting Yes  (9/2 )   Complexity of Encounter Low   Length of Encounter 15 minutes   Routine   Type Initial   Assessment Approachable;Calm   Intervention [place];Prayer;Nurtured hope   Outcome Acceptance;Expressed gratitude;Encouraged; Hopeful;Receptive

## 2021-09-02 NOTE — CONSULTS
[address] [place] [address]                                  CONSULTATION    PATIENT NAME: Nelsy Douglas                  :        1950  MED REC NO:   [id number]                           ROOM:       0002  ACCOUNT NO:   [de-identified]                           ADMIT DATE: 2021  PROVIDER:     Rowe Phalen, M.D. Leroy Vaishnavi:  2021    HEMATOLOGICAL CONSULTATION    REFERRING PROVIDER:  Dr. Kady Almanzar. REASON FOR REFERRAL:  Microcytic severe anemia with normal iron levels. HISTORY OF PRESENT ILLNESS:  The patient is a 68-year-old man who  follows up at [facility] in [place], had a CBC three weeks ago and  hemoglobin of 5.7 was noted. He was sent to the ER and was transfused  one unit of blood on 2021. His MCV was low at 67.7. White count  and platelets and differential were normal.  Creatinine was also normal  at 1.1 and GFR was 66. After the blood transfusion, his hemoglobin came  up to 7.5 and today is 7.6. His iron levels which were drawn after five  hours after blood transfusion showed that iron was elevated at 209 and  the ferritin was normal at 27 and iron saturation was 50%. Folate was  elevated at 7.1 and B12 was normal at 299. He had EGD which showed no  evidence of bleeding. Dr. Urmila Acosta is planning to do colonoscopy  tomorrow. On reviewing the records, his hemoglobin was normal on  03/15/2017 at 13.2. At that time, his MCV was normal and after that in  2018, his hemoglobin was between 9 and 10 and MCV was still normal.   On 2018, his hemoglobin was 10.7; now on 2021 hemoglobin  dropped to 5.7. He tells me he has no symptoms. He just sent to the ER  because of his hemoglobin was found to be low on routine CBC. No blood  in the stools or dark stools. No blood in the urine. He is not  vomiting blood or blood in sputum.   He had a CT scan of the abdomen  which was done for the abdominal distention and constipation. It showed  no kidney mass. It showed constipation, small left hydrocele and  enlarged prostate. His chest x-ray showed no infiltrate and showed  COPD. PAST MEDICAL HISTORY:  Significant for COPD. He had previous blood  transfusions and has history of hypertension. PAST SURGICAL HISTORY:  Back surgery; carotid subclavian bypass graft;  colonoscopy, I do not know when he had colonoscopy; coronary angioplasty  with stent placement and carotid endarterectomy. ALLERGIES:  NO KNOWN DRUG ALLERGIES. MEDICATIONS AT HOME:  He used to take Toprol, ferrous sulfate 325 mg  daily, Zithromax, Plavix, Percocet, Flomax, Zocor, Zestril, Senokot,  Prilosec, Viagra, and Mycolog ointment. SOCIAL HISTORY:  He smokes about one-fourth of a pack a day and has 32.5  pack-years history of smoking. He drinks but not excessively. FAMILY HISTORY:  There is no history of blood disease in the family. PHYSICAL EXAMINATION:  GENERAL:  He is awake, alert, and oriented x3, in no acute distress. Looks pale. VITAL SIGNS:  Blood pressure is 108/56, pulse 85, respirations 18, pulse  ox 97% on room air. HEENT:  Pupils are equal, round, and reacting. Throat is clear. NECK:  No nodes. No thyromegaly. LUNGS:  Clear to auscultation and percussion. HEART:  Regular rhythm. ABDOMEN:  Soft, mildly distended, nontender. No organomegaly. EXTREMITIES:  No pedal edema. No calf tenderness. NEUROLOGIC:  He is moving all four extremities and he is alert,  oriented. There are no focal deficits. LABORATORY DATA:  His white count today is 12.2, hemoglobin 7.6,  platelets are 269,268. Differential on admission was normal.  MCV is  still low at 72.4 and higher than at admission when it was 67.7 more  likely secondary to the blood transfusion.   His iron levels which were  drawn after the blood transfusion showed that elevated iron at 209 and  TIBC was 419 normal and saturation was 50%, ferritin was normal at 27. Folic acid normal at 7.1, B12 normal at 299, creatinine 1.1, GFR 56. Liver functions are normal except the total protein was low at 5.8. IMPRESSION:  1. Microcytic anemia, severe. More likely it is due to the blood loss  which may be chronic secondary to aspirin or Plavix or some lesion in  the colon. He is a very poor historian. He tells us that he does not  have any blood in the stool. There is no renal mass on the CT scan. Thalassemia is unlikely because his hemoglobin and MCV were normal in  2017. Other possibility could be myelodysplasia which may sometime  cause the microcytosis, but more likely is due to the iron deficiency. 2.  Coronary artery disease. The patient is on aspirin and Plavix. 3.  Constipation. PLAN:  His iron levels may be elevated from the blood transfusion he  received on admission and blood for the iron was drawn after the blood  transfusion. Monitor the CBC for now and wait for the colonoscopy  results. I will not order any hemoglobin electrophoresis because it is  less likely to be any congenital hemoglobinopathy since his hemoglobin  and MCV were normal in 2017. His iron and ferritin should be drawn in a  couple of weeks to see if he has iron deficiency. In case no iron deficiency is found, bone marrow aspiration may be done  in couple of months. Lead poisoning is less likely. Thank you Dr. Joshua Cai for asking me to take part in the care of this  patient.         Sade Brown M.D.    D: 09/01/2021 17:07:02       T: 09/01/2021 19:39:34     [email or web address]  Job#: [id number]     Doc#: [id number]    CC:

## 2021-09-03 ENCOUNTER — APPOINTMENT (OUTPATIENT)
Dept: GENERAL RADIOLOGY | Age: 71
DRG: 811 | End: 2021-09-03
Payer: OTHER GOVERNMENT

## 2021-09-03 LAB
ANION GAP SERPL CALCULATED.3IONS-SCNC: 9 MEQ/L (ref 8–16)
BUN BLDV-MCNC: 10 MG/DL (ref 7–22)
CALCIUM SERPL-MCNC: 9.1 MG/DL (ref 8.5–10.5)
CHLORIDE BLD-SCNC: 104 MEQ/L (ref 98–111)
CO2: 26 MEQ/L (ref 23–33)
CREAT SERPL-MCNC: 1 MG/DL (ref 0.4–1.2)
ERYTHROCYTE [DISTWIDTH] IN BLOOD BY AUTOMATED COUNT: 28.4 % (ref 11.5–14.5)
ERYTHROCYTE [DISTWIDTH] IN BLOOD BY AUTOMATED COUNT: 72.4 FL (ref 35–45)
GFR SERPL CREATININE-BSD FRML MDRD: 73 ML/MIN/1.73M2
GLUCOSE BLD-MCNC: 113 MG/DL (ref 70–108)
HCT VFR BLD CALC: 33.4 % (ref 42–52)
HEMOGLOBIN: 9.1 GM/DL (ref 14–18)
MCH RBC QN AUTO: 20.8 PG (ref 26–33)
MCHC RBC AUTO-ENTMCNC: 27.2 GM/DL (ref 32.2–35.5)
MCV RBC AUTO: 76.4 FL (ref 80–94)
PLATELET # BLD: 179 THOU/MM3 (ref 130–400)
PMV BLD AUTO: 10.2 FL (ref 9.4–12.4)
POTASSIUM SERPL-SCNC: 4.2 MEQ/L (ref 3.5–5.2)
RBC # BLD: 4.37 MILL/MM3 (ref 4.7–6.1)
SCAN OF BLOOD SMEAR: NORMAL
SODIUM BLD-SCNC: 139 MEQ/L (ref 135–145)
WBC # BLD: 9.7 THOU/MM3 (ref 4.8–10.8)

## 2021-09-03 PROCEDURE — 6370000000 HC RX 637 (ALT 250 FOR IP): Performed by: INTERNAL MEDICINE

## 2021-09-03 PROCEDURE — 6360000002 HC RX W HCPCS: Performed by: STUDENT IN AN ORGANIZED HEALTH CARE EDUCATION/TRAINING PROGRAM

## 2021-09-03 PROCEDURE — 85027 COMPLETE CBC AUTOMATED: CPT

## 2021-09-03 PROCEDURE — 2580000003 HC RX 258: Performed by: INTERNAL MEDICINE

## 2021-09-03 PROCEDURE — 6370000000 HC RX 637 (ALT 250 FOR IP): Performed by: STUDENT IN AN ORGANIZED HEALTH CARE EDUCATION/TRAINING PROGRAM

## 2021-09-03 PROCEDURE — 6370000000 HC RX 637 (ALT 250 FOR IP): Performed by: NURSE PRACTITIONER

## 2021-09-03 PROCEDURE — 6360000002 HC RX W HCPCS: Performed by: INTERNAL MEDICINE

## 2021-09-03 PROCEDURE — 74018 RADEX ABDOMEN 1 VIEW: CPT

## 2021-09-03 PROCEDURE — 99232 SBSQ HOSP IP/OBS MODERATE 35: CPT | Performed by: INTERNAL MEDICINE

## 2021-09-03 PROCEDURE — 99222 1ST HOSP IP/OBS MODERATE 55: CPT | Performed by: INTERNAL MEDICINE

## 2021-09-03 PROCEDURE — 80048 BASIC METABOLIC PNL TOTAL CA: CPT

## 2021-09-03 PROCEDURE — 2580000003 HC RX 258: Performed by: STUDENT IN AN ORGANIZED HEALTH CARE EDUCATION/TRAINING PROGRAM

## 2021-09-03 PROCEDURE — 2060000000 HC ICU INTERMEDIATE R&B

## 2021-09-03 PROCEDURE — 36415 COLL VENOUS BLD VENIPUNCTURE: CPT

## 2021-09-03 RX ORDER — SODIUM CHLORIDE 9 MG/ML
25 INJECTION, SOLUTION INTRAVENOUS PRN
Status: CANCELLED | OUTPATIENT
Start: 2021-09-03

## 2021-09-03 RX ORDER — SODIUM CHLORIDE 0.9 % (FLUSH) 0.9 %
5-40 SYRINGE (ML) INJECTION EVERY 12 HOURS SCHEDULED
Status: CANCELLED | OUTPATIENT
Start: 2021-09-03

## 2021-09-03 RX ORDER — POLYETHYLENE GLYCOL 3350, SODIUM CHLORIDE, SODIUM BICARBONATE, POTASSIUM CHLORIDE 420; 11.2; 5.72; 1.48 G/4L; G/4L; G/4L; G/4L
4000 POWDER, FOR SOLUTION ORAL ONCE
Status: COMPLETED | OUTPATIENT
Start: 2021-09-03 | End: 2021-09-03

## 2021-09-03 RX ORDER — SODIUM CHLORIDE 0.9 % (FLUSH) 0.9 %
5-40 SYRINGE (ML) INJECTION PRN
Status: CANCELLED | OUTPATIENT
Start: 2021-09-03

## 2021-09-03 RX ORDER — SODIUM CHLORIDE 450 MG/100ML
INJECTION, SOLUTION INTRAVENOUS CONTINUOUS
Status: CANCELLED | OUTPATIENT
Start: 2021-09-03

## 2021-09-03 RX ADMIN — PANTOPRAZOLE SODIUM 40 MG: 40 TABLET, DELAYED RELEASE ORAL at 06:17

## 2021-09-03 RX ADMIN — POLYETHYLENE GLYCOL 3350, SODIUM CHLORIDE, SODIUM BICARBONATE, POTASSIUM CHLORIDE 4000 ML: 420; 11.2; 5.72; 1.48 POWDER, FOR SOLUTION ORAL at 11:22

## 2021-09-03 RX ADMIN — Medication 100 MG: at 09:45

## 2021-09-03 RX ADMIN — CEFTRIAXONE SODIUM 1000 MG: 1 INJECTION, POWDER, FOR SOLUTION INTRAMUSCULAR; INTRAVENOUS at 04:29

## 2021-09-03 RX ADMIN — IRON SUCROSE 200 MG: 20 INJECTION, SOLUTION INTRAVENOUS at 09:42

## 2021-09-03 RX ADMIN — DOCUSATE SODIUM 50 MG AND SENNOSIDES 8.6 MG 2 TABLET: 8.6; 5 TABLET, FILM COATED ORAL at 21:45

## 2021-09-03 RX ADMIN — TAMSULOSIN HYDROCHLORIDE 0.4 MG: 0.4 CAPSULE ORAL at 09:45

## 2021-09-03 RX ADMIN — ATORVASTATIN CALCIUM 40 MG: 40 TABLET, FILM COATED ORAL at 21:44

## 2021-09-03 RX ADMIN — POLYETHYLENE GLYCOL 3350 17 G: 17 POWDER, FOR SOLUTION ORAL at 10:09

## 2021-09-03 RX ADMIN — SODIUM CHLORIDE, PRESERVATIVE FREE 10 ML: 5 INJECTION INTRAVENOUS at 09:46

## 2021-09-03 RX ADMIN — LISINOPRIL 20 MG: 20 TABLET ORAL at 09:44

## 2021-09-03 RX ADMIN — METOPROLOL SUCCINATE 25 MG: 25 TABLET, FILM COATED, EXTENDED RELEASE ORAL at 09:44

## 2021-09-03 RX ADMIN — DOCUSATE SODIUM 100 MG: 100 CAPSULE ORAL at 21:45

## 2021-09-03 RX ADMIN — POLYETHYLENE GLYCOL 3350 17 G: 17 POWDER, FOR SOLUTION ORAL at 21:45

## 2021-09-03 RX ADMIN — FOLIC ACID 1 MG: 1 TABLET ORAL at 09:45

## 2021-09-03 RX ADMIN — DOCUSATE SODIUM 100 MG: 100 CAPSULE ORAL at 09:42

## 2021-09-03 RX ADMIN — PANTOPRAZOLE SODIUM 40 MG: 40 TABLET, DELAYED RELEASE ORAL at 18:24

## 2021-09-03 ASSESSMENT — PAIN SCALES - GENERAL
PAINLEVEL_OUTOF10: 0
PAINLEVEL_OUTOF10: 0
PAINLEVEL_OUTOF10: 9
PAINLEVEL_OUTOF10: 9

## 2021-09-03 ASSESSMENT — PAIN DESCRIPTION - ORIENTATION
ORIENTATION: LEFT
ORIENTATION: LEFT

## 2021-09-03 ASSESSMENT — PAIN DESCRIPTION - LOCATION
LOCATION: WRIST
LOCATION: WRIST

## 2021-09-03 ASSESSMENT — PAIN DESCRIPTION - DESCRIPTORS
DESCRIPTORS: BURNING
DESCRIPTORS: BURNING

## 2021-09-03 NOTE — PROGRESS NOTES
**This is a Medical/ PA/ APRN Student Note and is charted for educational purposes. The non-physician staff attested note is not to be used for billing purposes or to guide patient care. Please see the physician modifications/ attestation for treatment plan/suggestions. This note has been reviewed and feedback has been provided to the student. *                                                    Hospitalist Progress Note      Patient:  Leonardo Crews    Unit/Bed:4K-02/002-A  YOB: 1950  MRN: [id number]   Acct: [de-identified]   PCP: Wilson Espinosa MD  Date of Admission: 8/31/2021    Assessment/Plan:    Symptomatic hypochromic microcytic anemima:  § CT abdomen with contrast: right lower lung infiltrate, bilateral small effusions, cholelithiasis, mild atrophy of the left kidney, constipation, old appearing L1 anterior wedge compression fracture, small left hydrocele, enlarged prostate, distal aortic aneurysm 3.8cm with atherosclerotic plaque (unsure if this is stable/chronic)   § EGD showed no reason for the anemia. · Hemoglobin is 6.6 (5.7 on admission pre-transfusion), MCV 72.4, MCHC 26.2, Fe 13. He denies SOB, dizziness, and palpitations. · Receiving 1 unit PRBC right now, repeat H&H post-transfusion   · CBC daily to monitor Hgb  · Plan for colonoscopy tomorrow or Saturday for profound anemia - following bowel cleanse with lactulose, pt has not had a BM in 4 days, since Monday   · Likely to be iron deficient anemia. Iron level is 13. Also, there are widening pulse pressures, which can be cause by this.   · Continue protonix 40 BID   · CEA results as 3.5 which is normal.  · Held morphine and percocet due to fatigue and lethargy   · GI and heme/onc following   · Not on NS due to regular diet, pt is eating and hydrating adequately      CAD s/p stent  § Continue toprol             § ASA & plavix held due to anemia and active bleeding       Hx of lung cancer   Pt reports diagnosis of lung cancer with biopsy 3yrs prior at [facility], reports he received radiation; unsure if he received chemo or resection otherwise   Reports dry cough; denies chest pain, shortness of breath, weight loss, appetite changes or change in stools   § CT chest: cavitary masslike lesion in the left apex measuring 2.1cm, right lower lobe airspace infiltrates and b/l pleural effusions, mucoid appearing material in the trachea and right mainstem bronchus   § Pulmonology consulted for possible biopsy   § Will follow up with [facility] oncologist for further treatment      Asymptomatic cystiitis  · UA: nitrite positive with many bacteria present, moderate leukocyte esterase, leukocytosis, and afebrile. · Started Ceftriaxone. · BPH could be a contributor.     Opioid induced constipation, last BM 5 days prior   ? Held percocet and oxycodone   ? Received glycolax and senna with no BM   ? Receiving lactulose, no bm     Primary HTN  · Was hypotensive on admission now 149/65. · Will restart NS for fluid hydration if unable to maintain pressures   · Lisinopril restarted with hold parameters     Alcohol abuse  · Reports drinking 14oz black velvet whiskey 3-5 times a week. No withdrawal symptoms reported. · Daily neuro checks  · Start thiamine and folate supplementation.      HLD   § Continued lipitor      BPH   Reports smelly urine, dribbling and incomplete emptying.  Unsure of incontinence. § Continued Flomax     Nicotine abuse, smokes quarter ppd history  § Nicotine patch while in hospital.  to quit.      Malnutrition: consulted dietician and nutrition seervices.       Expected discharge date:  TBD    Disposition/ Plan: To home after colonoscopy    Chief Complaint: Chest pain    Hospital Treatment Course: (Prior to today) ***    9/3/21: ***     Subjective (past 24 hours): The plan today is to have Mr. Rahul Rojo colonoscopy today or tomorrow to rule out an occult bleed.  We will start the bowel prep today, but he has not had a bowel movement since Monday (8/30/21) which was 5 days ago. He denies abdominal discomfort, pressure, or the need to defecate. He was given lactulose and miralax previously. He denies any pain this morning anywhere. He states that he is just very tired and wants to sleep. Past medical history, family history, social history and allergies reviewed again and is unchanged since admission. ROS (12 point review of systems completed. Pertinent positives noted.  Otherwise ROS is negative)   Review of Systems - Respiratory ROS: no cough, shortness of breath, or wheezing  Gastrointestinal ROS: no abdominal pain, change in bowel habits, or black or bloody stools  Genito-Urinary ROS: positive for - change in urinary stream and urinary frequency/urgency  negative for - dysuria, hematuria, incontinence or pelvic pain  Medications:  Reviewed    Infusion Medications    sodium chloride      sodium chloride      sodium chloride       Scheduled Medications    cefTRIAXone (ROCEPHIN) IV  1,000 mg IntraVENous Q24H    iron sucrose  200 mg IntraVENous Q24H    lisinopril  20 mg Oral Daily    metoprolol succinate  25 mg Oral Daily    sennosides-docusate sodium  2 tablet Oral Nightly    polyethylene glycol  17 g Oral BID    docusate sodium  100 mg Oral BID    pantoprazole  40 mg Oral BID AC    thiamine  100 mg Oral Daily    folic acid  1 mg Oral Daily    sodium chloride flush  10 mL IntraVENous 2 times per day    [Held by provider] enoxaparin  40 mg SubCUTAneous Daily    nicotine  1 patch TransDERmal Daily    tamsulosin  0.4 mg Oral Daily    atorvastatin  40 mg Oral Nightly     PRN Meds: sodium chloride, sodium chloride, sodium chloride flush, sodium chloride, ondansetron **OR** ondansetron, polyethylene glycol, acetaminophen **OR** acetaminophen, [Held by provider] oxyCODONE-acetaminophen, ipratropium-albuterol, [Held by provider] morphine      Intake/Output Summary (Last 24 hours) at 9/3/2021 0743  Last data filed at 9/3/2021 0424  Gross per 24 hour   Intake 2142.57 ml   Output 1875 ml   Net 267.57 ml       Diet:  ADULT DIET; Regular    Exam:  BP (!) 126/59   Pulse 77   Temp 98.7 °F (37.1 °C) (Oral)   Resp 14   Ht 5' 8\" (1.727 m)   Wt 116 lb 6.4 oz (52.8 kg)   SpO2 95%   BMI 17.70 kg/m²   General appearance: Patient seems very sleepy this morning, No apparent distress, appears stated age and cooperative. Neck: Supple, with full range of motion. No jugular venous distention. Trachea midline. Respiratory:  Normal respiratory effort. Clear to auscultation, bilaterally with Wheezes in the upper lobes and crackles in the lower lobe. Cardiovascular: Regular rate and rhythm with normal S1/S2 without murmurs, rubs or gallops. Abdomen: Soft, non-tender, non-distended with normal bowel sounds x 4 quadrants. Musculoskeletal: passive and active ROM x 4 extremities. Skin: Skin color, texture, turgor normal.  No rashes or lesions. Psychiatric: Alert and oriented, thought content appropriate, normal insight  Capillary Refill: Brisk,< 3 seconds   Peripheral Pulses: +2 palpable, equal bilaterally     Labs:   Recent Labs     08/31/21  1545 08/31/21 2039 09/01/21  0537 09/02/21  0524 09/02/21  1753   WBC 7.2  --  12.2* 11.0*  --    HGB 5.7*   < > 7.6* 6.6* 8.8*   HCT 23.1*   < > 28.8* 25.2* 30.3*     --  162 154  --     < > = values in this interval not displayed. Recent Labs     08/31/21  1545 09/02/21  0524    136   K 4.3 4.4    103   CO2 25 24   BUN 16 16   CREATININE 1.1 1.0   CALCIUM 8.6 8.6     Recent Labs     08/31/21  1545   AST 11   ALT <5*   BILIDIR <0.2   BILITOT 0.4   ALKPHOS 77     Recent Labs     08/31/21  1545   INR 1.03     No results for input(s): Moreno Leone in the last 72 hours.     Microbiology:    Blood culture #1: No results found for: [facility]    Blood culture #2:No results found for: BLOODCULT2    Organism:  Lab Results   Component Value Date    ORG Candida parapsilosis 09/02/2018 No results found for: LABGRAM    MRSA culture only:No results found for: [facility]    Urine culture: No results found for: LABURIN    Respiratory culture: No results found for: CULTRESP    Aerobic and Anaerobic :  No results found for: LABAERO  No results found for: LABANAE    Urinalysis:      Lab Results   Component Value Date    NITRU POSITIVE 09/01/2021    WBCUA 25-50 09/01/2021    BACTERIA MANY 09/01/2021    RBCUA 0-2 09/01/2021    BLOODU NEGATIVE 09/01/2021    SPECGRAV >1.030 09/01/2021    GLUCOSEU NEGATIVE 09/02/2018       Radiology:  CT CHEST HIGH RESOLUTION   Final Result   1. Cavitary masslike lesion in the left apex measuring 2.1 cm. This may be infectious or neoplastic. 2. Right lower lobe airspace infiltrates and bilateral pleural effusions. 3. Mucoid appearing material in the trachea and right mainstem bronchus. **This report has been created using voice recognition software. It may contain minor errors which are inherent in voice recognition technology. **      Final report electronically signed by Dr. Ella Alfaro on 9/2/2021 11:05 AM      CT ABDOMEN PELVIS W IV CONTRAST Additional Contrast? None   Final Result   1. Right lower lung infiltrate. 2. Bilateral small effusions. 3. Cholelithiasis. 4. Mild atrophy of the left kidney. 5. Constipation. 6. Old appearing L1 anterior wedge compression fracture. 7. Small left hydrocele. 8. Enlarged prostate. 9. Distal aortic aneurysm measuring 3.8 cm with atherosclerotic plaque. True lumen measuring 1.7 cm. **This report has been created using voice recognition software. It may contain minor errors which are inherent in voice recognition technology. **      Final report electronically signed by Dr. Itzel Grande on 9/1/2021 1:03 PM      XR CHEST PORTABLE   Final Result   1. There are linear parenchymal densities demonstrated overlying the left mid to upper lung zone. Cannot exclude an underlying pulmonary lesion.  Further characterization with chest CT is recommended. 2. There are nonspecific distended partially visualized gas-filled bowel loops overlying the upper abdomen which are incompletely visualized. Further evaluation with abdominal radiograph can be obtained if clinically indicated. **This report has been created using voice recognition software. It may contain minor errors which are inherent in voice recognition technology. **      Final report electronically signed by Dr. Rickey Kraus on 8/31/2021 4:30 PM        CT ABDOMEN PELVIS W IV CONTRAST Additional Contrast? None    Result Date: 9/1/2021  PROCEDURE: CT ABDOMEN PELVIS W IV CONTRAST CLINICAL INFORMATION: Symptomatic anemia with abdominal distention. COMPARISON: No prior study. TECHNIQUE: 5 mm axial imaging through the abdomen and pelvis with IV contrast.  Coronal and sagittal reconstructions were performed. All CT scans at this facility use dose modulation, iterative reconstruction, and/or weight based dosing when appropriate to reduce the radiation dose to as low as reasonably achievable. CONTRAST: Isovue 370, 80 mL FINDINGS: LUNG BASES: Right lower lung infiltrate. Bilateral small pleural effusions. LIVER/GALLBLADDER/BILIARY TREE: No enhancing liver lesions. Multiple small gallstones. Fold in the gallbladder. Biliary tree unremarkable. PANCREAS/SPLEEN: 1. Adrenal glands unremarkable. 2. Mild left renal atrophy. No renal stones or hydronephrosis. No renal mass. ADRENAL GLANDS/KIDNEYS: Unremarkable. BOWEL: 1. Nonobstructive. 2. Moderate amount of stool in the colon. FREE AIR/FREE FLUID/INFLAMMATION: None. LYMPHADENOPATHY: 1. No pathologically enlarged lymph nodes. ABDOMINAL AORTA: Distal aortic aneurysm measuring 3.8 cm with atherosclerotic plaque. True lumen measuring 1.7 cm. No aortic leak. PELVIS: 1. Urinary bladder is only partially distended and limitedly evaluated. 2. Prostate gland is enlarged measuring 5.6 x 4.8 x 4.2 cm.  ABDOMINAL WALL: Unremarkable. MUSCULOSKELETAL: Posterior lumbar hardware. Old appearing L1 anterior wedge compression fracture. OTHER: Small left hydrocele. 1. Right lower lung infiltrate. 2. Bilateral small effusions. 3. Cholelithiasis. 4. Mild atrophy of the left kidney. 5. Constipation. 6. Old appearing L1 anterior wedge compression fracture. 7. Small left hydrocele. 8. Enlarged prostate. 9. Distal aortic aneurysm measuring 3.8 cm with atherosclerotic plaque. True lumen measuring 1.7 cm. **This report has been created using voice recognition software. It may contain minor errors which are inherent in voice recognition technology. ** Final report electronically signed by Dr. Gucci Dejesus on 9/1/2021 1:03 PM    XR CHEST PORTABLE    Result Date: 8/31/2021  PROCEDURE: XR CHEST PORTABLE CLINICAL INFORMATION: hypotension COMPARISON: 3/15/2017, 3/31/2008 TECHNIQUE:  AP chest single view  FINDINGS: There are linear parenchymal densities demonstrated overlying the left mid to upper lung zone. Cannot exclude an underlying pulmonary lesion. Further characterization with chest CT is recommended. No pneumothorax is seen. No pleural effusion is demonstrated. The heart size is normal. No acute osseous findings are seen. There are distended gas-filled bowel loops overlying the upper abdomen which are incompletely visualized. Further evaluation with abdominal radiograph can be obtained if clinically indicated. 1. There are linear parenchymal densities demonstrated overlying the left mid to upper lung zone. Cannot exclude an underlying pulmonary lesion. Further characterization with chest CT is recommended. 2. There are nonspecific distended partially visualized gas-filled bowel loops overlying the upper abdomen which are incompletely visualized. Further evaluation with abdominal radiograph can be obtained if clinically indicated. **This report has been created using voice recognition software.   It may contain minor errors which are inherent in voice recognition technology. ** Final report electronically signed by Dr. Bj Sumner on 8/31/2021 4:30 PM      Code Status: Full Code    Tele:   [x] yes             [] no    Electronically signed by Ilda Casey on 9/3/2021 at 7:43 AM     **This is a Medical/ PA/ APRN Student Note and is charted for educational purposes. The non-physician staff attested note is not to be used for billing purposes or to guide patient care. Please see the physician modifications/ attestation for treatment plan/suggestions. This note has been reviewed and feedback has been provided to the student.  *

## 2021-09-03 NOTE — OP NOTE
[facility] Endoscopy    Patient: Arnaldo Garcia  : 1950  Acct#: [de-identified]  Date of evaluation: 9/3/2021  Primary Care Physician: Stalin Graf MD     Procedure:  ***  []EGD    []Enteroscopy    []Biopsy   []Dilation      []PEG    []PEG change    []PEG removal  []Variceal banding    []Control of bleeding  []Destruction of lesion by [facility]    []Stent Placement  []Foreign Body Removal    []Snare Polypectomy  []Other:     []Aborted:        [x]Colonoscopy  []Flex Sigmoid []EUS, rectal     []Biopsy   []Snare Polypectomy    []Control of bleeding  []Destruction of lesion by [facility]    []Stent Placement  []Foreign Body Removal    []Dilation    []Other:    []Aborted:   []Tattoo    Indication:   Anemia, profound     History:  The patient is a 66 y.o. male with chronic pain on narcotic analgesics and CAD s/p PCI  on ASA and Plavix admitted 21 for profound anemia and abd distension. I performed EGD 21 that demonstrated a hiatal hernia and esophagitis but no good reason for his profound anemia. MEDICATION:    MAC/Propofol/Anesthesia:  Yes     Photo:  Yes  Biopsy:  {YES / EP:99672}      Description of Procedure: The risks and benefits of the procedure were described to the patient or their representative if unable to give consent including but not limited to bleeding, infection, poking a hole someplace requiring surgery to fix it, having a reaction to medication, and death. The patient or their representative if unable to give consent understood these risks and provided informed consent. Airway was assessed and is adequate for the planned sedation. Anesthesia: MAC  Estimated Blood Loss: {NUMBERS; PGQ:28072}  Complications: {Symptoms; Intra-op complications:36573}  Specimens: {WAS / WAS NOT:89462} see below ***    Sedation was administered by anesthesia who monitored the patient during the procedure.    ***

## 2021-09-03 NOTE — INTERVAL H&P NOTE
Update History & Physical    Patient now consenting for colonoscopy. No other changes in HXPE. Plan: The risks, benefits, expected outcome, and alternative to the recommended procedure have been discussed with the patient. Patient understands and wants to proceed with the procedure.      Electronically signed by Rich Hodges MD on 9/3/2021 at 10:10 AM

## 2021-09-03 NOTE — CARE COORDINATION
DISCHARGE/PLANNING EVALUATION  9/3/21, 9:36 AM EDT    Reason for Referral: Discharge planning  Mental Status: Alert and Oriented  Decision Making: Seelf  Family/Social/Home Environment: Patient lives with spouse in home. Current Services including food security, transportation and housekeeping: Pateint reported that his wife completed the cooking, cleaning, and transportation. Current Equipment: None  Payment Source: VA and Medicare  Concerns or Barriers to Discharge: none  Post acute provider list with quality measures, geographic area and applicable managed care information provided. Questions regarding selection process answered: Offered    Teach Back Method used with patient regarding care plan and needs. Patient verbalize understanding of the plan of care and contribute to goal setting. Patient goals, treatment preferences and discharge plan: Patient plans to discharge home with wife and new HH through the [place]. He reported that he did not have a preference on agency and wanted the [place] to just \"pick one\". He reported that he has transportation at discharge.     Electronically signed by DIANA Gibbs on 9/3/2021 at 9:36 AM

## 2021-09-03 NOTE — PROGRESS NOTES
Pt Name: Nela Gunderson  MRN: [id number]  [id number]  YOB: 1950  Admit Date: 8/31/2021  3:24 PM  Date of evaluation: 9/3/2021  Primary Care Physician: Gerardo Perea MD   4K-02/002-A     INES    SAndra No complaints. Pt consenting to colonoscopy    O.     Vitals:    09/03/21 0758   BP: (!) 142/65   Pulse: 73   Resp: 16   Temp: 98.4 °F (36.9 °C)   SpO2: 97%       Body mass index is 17.7 kg/m². Awake and alert   clear to auscultation bilaterally   regular rate and rhythm   Soft and nondistended with normal BS, nontender   no cyanosis, clubbing or edema present      Current Meds    cefTRIAXone (ROCEPHIN) IV  1,000 mg IntraVENous Q24H    iron sucrose  200 mg IntraVENous Q24H    lisinopril  20 mg Oral Daily    metoprolol succinate  25 mg Oral Daily    sennosides-docusate sodium  2 tablet Oral Nightly    polyethylene glycol  17 g Oral BID    docusate sodium  100 mg Oral BID    pantoprazole  40 mg Oral BID AC    thiamine  100 mg Oral Daily    folic acid  1 mg Oral Daily    sodium chloride flush  10 mL IntraVENous 2 times per day    [Held by provider] enoxaparin  40 mg SubCUTAneous Daily    nicotine  1 patch TransDERmal Daily    tamsulosin  0.4 mg Oral Daily    atorvastatin  40 mg Oral Nightly      sodium chloride      sodium chloride      sodium chloride       Diet: ADULT DIET; Regular       A.  66 y.o. male with chronic pain on narcotic analgesics and CAD s/p PCI 2006 on ASA and Plavix admitted 8/31/21 for profound anemia and abd distension. EGD 9/1/21 demonstrated small hiatal hernia and LA grade B esophagitis w/o reason for profound anemia.     Anemia, profound  ASA and plavix use  Narcotic analgesic use  Opioid-induced constipation    P.      Pt now willing to do colonoscopy - plan for tomorrow after prep, high risk given Plavix use but benefits outweigh risks      Chikis Hebert MD, MD  9:42 AM 9/3/2021

## 2021-09-03 NOTE — CARE COORDINATION
DISCHARGE PLANNING UPDATE    Barriers to Discharge: H 8.8; monitor.  GI plans Colonoscopy in am    Discharge Plan:   client prefers plans home w [facility] (nursing only) when medically cleared; SW referral
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
## 2021-09-03 NOTE — CONSULTS
[facility]      Patient - Corry Moody   MRN -  [id number]   [facility]   - 1950      Date of Admission -  2021  3:24 PM  Date of evaluation -  9/3/2021  Room - --A   Hospital Day -  [address], [place] Primary Care Physician - Alfa Silva MD     Problem List      Active Hospital Problems    Diagnosis Date Noted    Coronary artery disease involving native coronary artery of native heart without angina pectoris [I25.10] 2021    Tobacco use disorder [F17.200] 2021    Weakness of both lower extremities [R29.898] 2021    Orthostatic hypotension [I95.1] 2021    COPD without exacerbation ([address] [J44.9] 2021    Lactic acidosis [E87.2] 2021    Anemia [D64.9] 2021    Elevated PSA [R97.20] 2016     Reason for Consult    Cavitary lung mass founded on CT chest with a history of prior lung cancer   HPI   History Obtained From: Patient and electronic medical record. Corry Moody is a 66 y.o. male in which we were consulted for a cavitary mass found on CT chest. Patient states he does have a history of lung cancer. Patient was initially evaluated in the ED for anemia on 21 and his Hbg was found to be 5.7. He was transfused and subsequently admitted. He is a patient at the [place]. Eliciting history from patient proves to be difficult as he is not conversational and he is moderately cooperative. Found to have history of Lung Cancer that was treated at the [place] in [facility]. Incidental finding of a 2.1cm cavitary masslike lesion found in the left apex. 32pack year history with no intention to quit. Patient denies any SOB, cough, chest pain, fever/chills. Patient scheduled to have colonoscopy for evaluation for bleeding.     He is having shortness of breath: No    He is having cough: No    He is having chest pain:No    PMHx   Past Medical History      Diagnosis Date    COPD [facility]chronic obstructive pulmonary disease) ([facility])     Coronary artery disease involving native coronary artery of native heart     History of blood transfusion     Hypertension       Past Surgical History        Procedure Laterality Date    BACK SURGERY      CAROTID-SUBCLAVIAN BYPASS GRAFT      COLONOSCOPY      CORONARY ANGIOPLASTY WITH STENT PLACEMENT      NECK SURGERY      carotid endarterectomy     Meds    Current Medications    cefTRIAXone (ROCEPHIN) IV  1,000 mg IntraVENous Q24H    iron sucrose  200 mg IntraVENous Q24H    lisinopril  20 mg Oral Daily    metoprolol succinate  25 mg Oral Daily    sennosides-docusate sodium  2 tablet Oral Nightly    polyethylene glycol  17 g Oral BID    docusate sodium  100 mg Oral BID    pantoprazole  40 mg Oral BID AC    thiamine  100 mg Oral Daily    folic acid  1 mg Oral Daily    sodium chloride flush  10 mL IntraVENous 2 times per day    [Held by provider] enoxaparin  40 mg SubCUTAneous Daily    nicotine  1 patch TransDERmal Daily    tamsulosin  0.4 mg Oral Daily    atorvastatin  40 mg Oral Nightly     bisacodyl, sodium chloride, sodium chloride, sodium chloride flush, sodium chloride, ondansetron **OR** ondansetron, polyethylene glycol, acetaminophen **OR** acetaminophen, [Held by provider] oxyCODONE-acetaminophen, ipratropium-albuterol, [Held by provider] morphine  IV Drips/Infusions   sodium chloride      sodium chloride      sodium chloride       Home Medications  Medications Prior to Admission: simvastatin (ZOCOR) 20 MG tablet, Take 20 mg by mouth nightly  morphine (JAMEL) 30 MG extended release capsule, Take 30 mg by mouth 4 times daily.   metoprolol succinate (TOPROL XL) 25 MG extended release tablet, Take 25 mg by mouth daily  ferrous sulfate (IRON 325) 325 (65 Fe) MG tablet, Take 325 mg by mouth daily (with breakfast)  clopidogrel (PLAVIX) 75 MG tablet, Take 75 mg by mouth daily  oxyCODONE-acetaminophen (PERCOCET) 5-325 MG per tablet, Take 1 tablet by mouth every 4 hours as needed for Pain  aspirin 81 MG tablet, Take 81 mg by mouth daily  tamsulosin (FLOMAX) 0.4 MG capsule, Take 0.4 mg by mouth daily  lisinopril (PRINIVIL;ZESTRIL) 20 MG tablet, Take 20 mg by mouth daily  senna-docusate (PERICOLACE) 8.6-50 MG per tablet, Take 3 tablets by mouth daily   omeprazole (PRILOSEC) 20 MG capsule, Take 20 mg by mouth daily  sildenafil (VIAGRA) 100 MG tablet, Take 100 mg by mouth once a week  nystatin-triamcinolone (MYCOLOG) 665989-4.1 UNIT/GM-% ointment, Apply topically 2 times daily. [DISCONTINUED] morphine (MSIR) 15 MG tablet, Take 15 mg by mouth every 6 hours as needed for Pain  [DISCONTINUED] simvastatin (ZOCOR) 80 MG tablet, Take 80 mg by mouth nightly  Diet    ADULT DIET; Clear Liquid  Diet NPO  Allergies    Patient has no known allergies. Social History     Social History     Socioeconomic History    Marital status:      Spouse name: Not on file    Number of children: Not on file    Years of education: Not on file    Highest education level: Not on file   Occupational History    Not on file   Tobacco Use    Smoking status: Current Every Day Smoker     Packs/day: 0.50     Years: 65.00     Pack years: 32.50     Types: Cigarettes    Smokeless tobacco: Never Used   Vaping Use    Vaping Use: Never used   Substance and Sexual Activity    Alcohol use: Yes     Comment: nithiney 1-2 times a week    Drug use: No    Sexual activity: Not Currently   Other Topics Concern    Not on file   Social History Narrative    Not on file     Social Determinants of Health     Financial Resource Strain:     Difficulty of Paying Living Expenses:    Food Insecurity:     Worried About Running Out of Food in the Last Year:     Ran Out of Food in the Last Year:    Transportation Needs:     Lack of Transportation (Medical):      Lack of Transportation (Non-Medical):    Physical Activity:     Days of Exercise per Week:     Minutes of Exercise per Session: Stress:     Feeling of Stress :    Social Connections:     Frequency of Communication with Friends and Family:     Frequency of Social Gatherings with Friends and Family:     Attends Zoroastrianism Services:     Active Member of Clubs or Organizations:     Attends Club or Organization Meetings:     Marital Status:    Intimate Partner Violence:     Fear of Current or Ex-Partner:     Emotionally Abused:     Physically Abused:     Sexually Abused:      Family History    No family history on file. Sleep History    Never diagnosed with sleep apnea in the past.  Occupational history   Occupation:  He is current working: No                  History of tobacco smoking:Yes  Amount of tobacco smokin.5 PPD. Years of tobacco smokin.                                    Quit smoking: No.              Current smoker: Yes. Amount of current tobacco smokin.5 PPD  . History of recreational or IV drug use in the past: NO     History of Agent Orange exposure during de    History of pulmonary embolism in the past: No            History of DVT in the past:No        [] Right lower extremity   [] Left lower extremity. [place] of systems   General/Constitutional: Denies fevers, chills. HENT:  Negative  Resp: Denies shortness of breath cough,wheezing. Cardiovascular: Denies chest pain, palpitations  Gastrointestinal: Denies abdominal pain, N/V/C/D  Neurological: Negative  Musculoskeletal: B/l LE weakness - chronic. Chairbound at home, has crutches but does not  Genitourinary: Denies dysuria, hematuria  Psychiatric/Behavioral: Negative  Skin: Negative     Vitals     height is 5' 8\" (1.727 m) and weight is 116 lb 6.4 oz (52.8 kg). His oral temperature is 98.2 °F (36.8 °C). His blood pressure is 145/68 (abnormal) and his pulse is 72. His respiration is 15 and oxygen saturation is 97%. Body mass index is 17.7 kg/m².     SUPPLEMENTAL O2: O2 Flow Rate (L/min): 1 L/min     I/O        Intake/Output Summary (Last 24 hours) at 9/3/2021 1441  Last data filed at 9/3/2021 1355  Gross per 24 hour   Intake 1759.08 ml   Output 1600 ml   Net 159.08 ml     I/O last 3 completed shifts: In: 2142.6 [P.O.:650; I.V.:1182.6; Blood:310]  Out: 1875 [VCHCT:5121]   Patient Vitals for the past 96 hrs (Last 3 readings):   Weight   09/03/21 0415 116 lb 6.4 oz (52.8 kg)   09/02/21 0400 117 lb 3.2 oz (53.2 kg)   09/01/21 0345 116 lb (52.6 kg)       Exam   Nursing note and vitals reviewed. Constitutional: Chronically ill-appearing. Alert and oriented x3. No acute distress. HENT: Normocephalic, atraumatic. External ears normal.  Nose normal. No erythema noted in throat. Eyes:  PERRLA, EOMI  Neck: Supple, trachea was midline, no JVD noted. Cardiovascular: S1, S2 present. Regular rate rhythm no murmurs, rubs, gallops. Pulmonary/Chest:     Abdominal: Bowel sounds present in all quadrants, no tenderness palpation, no organomegaly  Musculoskeletal: ROM and strength WNL   Lymphadenopathy:  None noted  Neurological: Grossly intact  Skin: No rash or wounds noted. Psychiatric: Flat affect. Patient is moderately cooperative. Labs  - Old records and notes have been reviewed in [facility]   ABG  Lab Results   Component Value Date    PH 7.43 09/01/2021    PO2 72 09/01/2021    PCO2 34 09/01/2021    HCO3 23 09/01/2021    O2SAT 95 09/01/2021     No results found for: SHAD Wynn  CBC  Recent Labs     09/01/21  0537 09/01/21  0537 09/02/21  0524 09/02/21  1753 09/03/21  0909   WBC 12.2*  --  11.0*  --  9.7   RBC 3.98*  --  3.48*  --  4.37*   HGB 7.6*   < > 6.6* 8.8* 9.1*   HCT 28.8*   < > 25.2* 30.3* 33.4*   MCV 72.4*  --  72.4*  --  76.4*   MCH 19.1*  --  19.0*  --  20.8*   MCHC 26.4*  --  26.2*  --  27.2*     --  154  --  179   MPV 9.6  --  10.5  --  10.2    < > = values in this interval not displayed.       BMP  Recent Labs     08/31/21  1545 09/02/21  0524 09/03/21  0909    136 139   K 4.3 4.4 4.2   CL 100 103 104   CO2 25 24 26   BUN 16 16 10   CREATININE 1.1 1.0 1.0   GLUCOSE 123* 96 113*   CALCIUM 8.6 8.6 9.1     LFT  Recent Labs     08/31/21  1545   AST 11   ALT <5*   BILITOT 0.4   ALKPHOS 77     TROP  Lab Results   Component Value Date    TROPONINT < 0.010 08/31/2021     BNP  No results for input(s): BNP in the last 72 hours. Lactic Acid  Recent Labs     09/01/21  0537   LACTA 1.8     INR  Recent Labs     08/31/21  1545   INR 1.03     PTT  Recent Labs     08/31/21  1545   APTT 24.4     Glucose  No results for input(s): POCGLU in the last 72 hours. UA   Recent Labs     09/01/21  2300   SPECGRAV >1.030*   PHUR 7.5   COLORU YELLOW   PROTEINU NEGATIVE   BLOODU NEGATIVE   RBCUA 0-2   WBCUA 25-50   BACTERIA MANY   NITRU POSITIVE*   BILIRUBINUR NEGATIVE   UROBILINOGEN 1.0   KETUA NEGATIVE   LABCAST NONE SEEN  NONE SEEN   . PFTs   Awaiting records    Sleep studies   None available    Cultures    Urine culture was positive for enterogram-negative bacilli. MRSA screen was negative  VRE screen was negative    EKG   EKG from 8/31/2021 shows a normal sinus rhythm ventricular rate of 69. There were no ST segment depressions or elevations. No T wave abnormalities to suggest acute ischemia. When compared to previous EKG no significant changes were identified. Echocardiogram   None available    Radiology    CXR -     CXR - 8/31/21  1. There are linear parenchymal densities demonstrated overlying the left mid to upper lung zone. Cannot exclude an underlying pulmonary lesion. Further characterization with chest CT is recommended.       2. There are nonspecific distended partially visualized gas-filled bowel loops overlying the upper abdomen which are incompletely visualized. Further evaluation with abdominal radiograph can be obtained if clinically indicated.             CT Scans    CT of the chest - High resolution 9/2/21    Cavitary masslike lesion in the left apex measuring 2.1 cm.    This may be infectious or neoplastic. 2. Right lower lobe airspace infiltrates and bilateral pleural effusions. 3. Mucoid appearing material in the trachea and right mainstem bronchus. (See actual reports for details)    Assessment     - COPD  - History of Lung Cancer  - Cavitary mass on Lung CT  - Hypochromic Microcytic anemia      Plan     - Awaiting CT and PFT reports from [place].   - Patient is unsure if he wants to pursue treatment or further workup. - Will likely need to follow up with the [facility] for further evaluation and        management if he chooses to do so     - Will start COPD treatment regiment based on PFT's once received. - Duonebs q4h PRN      \"Thank you for asking us to see this patient\"      Questions and concerns addressed. Electronically signed by   Pretty Weiss DO on 9/3/2021 at 2:41 PM     Addendum by Dr. Riya Burris MD:  I have seen and examined the patient independently. Face to face evaluation and examination was performed. The above evaluation and note has been reviewed. Labs and radiographs were reviewed. I have discussed with Fernando Garcia about this patient in detail. The above assessment and plan has been reviewed. Please see my modifications mentioned below. My modifications:  He is a 33-year-old pleasant gentleman with chronic use of tobacco smoking for 65 years with 1 pack of cigarettes per day. He is still smoking 0.25 packs of cigarettes per day. He was diagnosed with a COPD. He had a history of lung cancer on his left side. Patient told me that he underwent radiation therapy with a local installation of radioactive material to his lung cancer at the [facility] in [place]. He usually follows with a pulmonologist in [place]. Patient denies any hemoptysis, recent loss of weight, fever chills. He is having occasional cough with minimal sputum production. He denies any recent history of exposure to pulmonary tuberculosis patients.   Denies any recent travel history. Plan: We will request old [facility]. including latest pulmonary medicine progress note and the latest CT scan of chest reports to compare his left-sided cavitary lung lesion. I spoke with RN taking care of the patient and informed about my impression plan.   We will follow    Patsy Perez MD 9/3/2021 6:32 PM

## 2021-09-03 NOTE — CARE COORDINATION
9/3/21, 9:38 AM EDT    Patient goals/plan/ treatment preferences discussed by  and . Patient goals/plan/ treatment preferences reviewed with patient/ family. Patient/ family verbalize understanding of discharge plan and are in agreement with goal/plan/treatment preferences. Understanding was demonstrated using the teach back method. AVS provided by RN at time of discharge, which includes all necessary medical information pertaining to the patients current course of illness, treatment, post-discharge goals of care, and treatment preferences. Services After Discharge  Services At/After Discharge: Nursing Services   IMM Letter  IMM Letter date given[de-identified] 08/31/21       Patient to discharge over weekend with wife and new HH through the [place]. Patient is aware and agreeable to discharge plan. Family to transport at discharge. SW completed [facility] form and placed discharge instructions on chart. RN made aware.

## 2021-09-03 NOTE — PLAN OF CARE
Problem: Discharge Planning:  Goal: Discharged to appropriate level of care  Description: Discharged to appropriate level of care  Outcome: Completed   SW consult received and completed. See SW note.

## 2021-09-03 NOTE — PROGRESS NOTES
Report received from Killian Johns.   Electronically signed by Kyler Bowden on 9/3/2021 at 5:25 PM   /  [facility]

## 2021-09-04 VITALS
WEIGHT: 116.4 LBS | RESPIRATION RATE: 16 BRPM | HEART RATE: 86 BPM | BODY MASS INDEX: 17.64 KG/M2 | DIASTOLIC BLOOD PRESSURE: 77 MMHG | SYSTOLIC BLOOD PRESSURE: 116 MMHG | HEIGHT: 68 IN | OXYGEN SATURATION: 96 % | TEMPERATURE: 98.1 F

## 2021-09-04 LAB
ANISOCYTOSIS: PRESENT
BASOPHILS # BLD: 0.3 %
BASOPHILS ABSOLUTE: 0 THOU/MM3 (ref 0–0.1)
ELLIPTOCYTES: ABNORMAL
EOSINOPHIL # BLD: 2.8 %
EOSINOPHILS ABSOLUTE: 0.2 THOU/MM3 (ref 0–0.4)
ERYTHROCYTE [DISTWIDTH] IN BLOOD BY AUTOMATED COUNT: 29.3 % (ref 11.5–14.5)
ERYTHROCYTE [DISTWIDTH] IN BLOOD BY AUTOMATED COUNT: 76 FL (ref 35–45)
HCT VFR BLD CALC: 31.7 % (ref 42–52)
HEMOGLOBIN: 8.6 GM/DL (ref 14–18)
HYPOCHROMIA: PRESENT
IMMATURE GRANS (ABS): 0.08 THOU/MM3 (ref 0–0.07)
IMMATURE GRANULOCYTES: 1.1 %
LYMPHOCYTES # BLD: 15.2 %
LYMPHOCYTES ABSOLUTE: 1.1 THOU/MM3 (ref 1–4.8)
MCH RBC QN AUTO: 21 PG (ref 26–33)
MCHC RBC AUTO-ENTMCNC: 27.1 GM/DL (ref 32.2–35.5)
MCV RBC AUTO: 77.3 FL (ref 80–94)
MONOCYTES # BLD: 6.7 %
MONOCYTES ABSOLUTE: 0.5 THOU/MM3 (ref 0.4–1.3)
NUCLEATED RED BLOOD CELLS: 0 /100 WBC
ORGANISM: ABNORMAL
PLATELET # BLD: 182 THOU/MM3 (ref 130–400)
PMV BLD AUTO: 9.7 FL (ref 9.4–12.4)
POIKILOCYTES: ABNORMAL
RBC # BLD: 4.1 MILL/MM3 (ref 4.7–6.1)
SCAN OF BLOOD SMEAR: NORMAL
SEG NEUTROPHILS: 73.9 %
SEGMENTED NEUTROPHILS ABSOLUTE COUNT: 5.2 THOU/MM3 (ref 1.8–7.7)
SPHEROCYTES: ABNORMAL
URINE CULTURE, ROUTINE: ABNORMAL
WBC # BLD: 7.1 THOU/MM3 (ref 4.8–10.8)

## 2021-09-04 PROCEDURE — 6360000002 HC RX W HCPCS: Performed by: STUDENT IN AN ORGANIZED HEALTH CARE EDUCATION/TRAINING PROGRAM

## 2021-09-04 PROCEDURE — 99232 SBSQ HOSP IP/OBS MODERATE 35: CPT | Performed by: NURSE PRACTITIONER

## 2021-09-04 PROCEDURE — 2580000003 HC RX 258: Performed by: STUDENT IN AN ORGANIZED HEALTH CARE EDUCATION/TRAINING PROGRAM

## 2021-09-04 PROCEDURE — 6370000000 HC RX 637 (ALT 250 FOR IP): Performed by: INTERNAL MEDICINE

## 2021-09-04 PROCEDURE — 6370000000 HC RX 637 (ALT 250 FOR IP): Performed by: STUDENT IN AN ORGANIZED HEALTH CARE EDUCATION/TRAINING PROGRAM

## 2021-09-04 PROCEDURE — 85025 COMPLETE CBC W/AUTO DIFF WBC: CPT

## 2021-09-04 PROCEDURE — 94640 AIRWAY INHALATION TREATMENT: CPT

## 2021-09-04 PROCEDURE — 99239 HOSP IP/OBS DSCHRG MGMT >30: CPT | Performed by: INTERNAL MEDICINE

## 2021-09-04 PROCEDURE — 6370000000 HC RX 637 (ALT 250 FOR IP): Performed by: NURSE PRACTITIONER

## 2021-09-04 PROCEDURE — 2580000003 HC RX 258: Performed by: INTERNAL MEDICINE

## 2021-09-04 PROCEDURE — 6360000002 HC RX W HCPCS: Performed by: INTERNAL MEDICINE

## 2021-09-04 PROCEDURE — 36415 COLL VENOUS BLD VENIPUNCTURE: CPT

## 2021-09-04 RX ORDER — FOLIC ACID 1 MG/1
1 TABLET ORAL DAILY
Qty: 30 TABLET | Refills: 3 | Status: SHIPPED | OUTPATIENT
Start: 2021-09-05

## 2021-09-04 RX ORDER — DOCUSATE SODIUM 100 MG/1
100 CAPSULE, LIQUID FILLED ORAL 2 TIMES DAILY
Qty: 60 CAPSULE | Refills: 1 | Status: SHIPPED | OUTPATIENT
Start: 2021-09-04

## 2021-09-04 RX ORDER — GUAIFENESIN 600 MG/1
600 TABLET, EXTENDED RELEASE ORAL 2 TIMES DAILY
Status: DISCONTINUED | OUTPATIENT
Start: 2021-09-04 | End: 2021-09-04 | Stop reason: HOSPADM

## 2021-09-04 RX ORDER — IPRATROPIUM BROMIDE AND ALBUTEROL SULFATE 2.5; .5 MG/3ML; MG/3ML
1 SOLUTION RESPIRATORY (INHALATION)
Status: DISCONTINUED | OUTPATIENT
Start: 2021-09-04 | End: 2021-09-04 | Stop reason: HOSPADM

## 2021-09-04 RX ORDER — PANTOPRAZOLE SODIUM 40 MG/1
40 TABLET, DELAYED RELEASE ORAL
Qty: 30 TABLET | Refills: 3 | Status: SHIPPED | OUTPATIENT
Start: 2021-09-04

## 2021-09-04 RX ORDER — LANOLIN ALCOHOL/MO/W.PET/CERES
100 CREAM (GRAM) TOPICAL DAILY
Qty: 30 TABLET | Refills: 3 | Status: SHIPPED | OUTPATIENT
Start: 2021-09-05

## 2021-09-04 RX ORDER — CEPHALEXIN 500 MG/1
500 CAPSULE ORAL 4 TIMES DAILY
Qty: 20 CAPSULE | Refills: 0 | Status: SHIPPED | OUTPATIENT
Start: 2021-09-04 | End: 2021-09-09

## 2021-09-04 RX ADMIN — METOPROLOL SUCCINATE 25 MG: 25 TABLET, FILM COATED, EXTENDED RELEASE ORAL at 09:44

## 2021-09-04 RX ADMIN — PANTOPRAZOLE SODIUM 40 MG: 40 TABLET, DELAYED RELEASE ORAL at 05:41

## 2021-09-04 RX ADMIN — Medication 100 MG: at 09:44

## 2021-09-04 RX ADMIN — CEFTRIAXONE SODIUM 1000 MG: 1 INJECTION, POWDER, FOR SOLUTION INTRAMUSCULAR; INTRAVENOUS at 04:23

## 2021-09-04 RX ADMIN — SODIUM CHLORIDE, PRESERVATIVE FREE 10 ML: 5 INJECTION INTRAVENOUS at 09:45

## 2021-09-04 RX ADMIN — FOLIC ACID 1 MG: 1 TABLET ORAL at 09:44

## 2021-09-04 RX ADMIN — IPRATROPIUM BROMIDE AND ALBUTEROL SULFATE 1 AMPULE: .5; 3 SOLUTION RESPIRATORY (INHALATION) at 12:21

## 2021-09-04 RX ADMIN — GUAIFENESIN 600 MG: 600 TABLET, EXTENDED RELEASE ORAL at 12:19

## 2021-09-04 RX ADMIN — TAMSULOSIN HYDROCHLORIDE 0.4 MG: 0.4 CAPSULE ORAL at 09:44

## 2021-09-04 RX ADMIN — LISINOPRIL 20 MG: 20 TABLET ORAL at 09:44

## 2021-09-04 RX ADMIN — IRON SUCROSE 200 MG: 20 INJECTION, SOLUTION INTRAVENOUS at 10:36

## 2021-09-04 ASSESSMENT — PAIN SCALES - GENERAL: PAINLEVEL_OUTOF10: 0

## 2021-09-04 NOTE — PROGRESS NOTES
Entered room to attempt to give patient enema, patient refusing enema, education provided regarding need of enema to be able to complete colonoscopy in the AM, patient states he doesn't want colonoscopy. Primary nurse Sin Watson RN notified.

## 2021-09-04 NOTE — PROGRESS NOTES
[facility]      Patient - Shay Bales   MRN -  [id number]   [facility]   - 1950      Date of Admission -  2021  3:24 PM  Date of evaluation -  2021  Room - --A   Hospital Day - [facility] [address] MD Primary Care Physician - Dede Barrera MD     Problem List      Active Hospital Problems    Diagnosis Date Noted    Coronary artery disease involving native coronary artery of native heart without angina pectoris [I25.10] 2021    Tobacco use disorder [F17.200] 2021    Weakness of both lower extremities [R29.898] 2021    Orthostatic hypotension [I95.1] 2021    COPD without exacerbation ([facility] [address] [J44.9] 2021    Lactic acidosis [E87.2] 2021    Anemia [D64.9] 2021    Elevated PSA [R97.20] 2016     Reason for Consult    Cavitary Lung mass on CT   HPI   History Obtained From: Patient and electronic medical record. Shay Bales  is a 75-year-old pleasant gentleman with chronic use of tobacco smoking for 65 years with 1 pack of cigarettes per day. He is still smoking 0.25 packs of cigarettes per day. He was diagnosed with a COPD. He had a history of lung cancer on his left side. Patient told me that he underwent radiation therapy with a local installation of radioactive material to his lung cancer at the [facility] in [place]. He usually follows with a pulmonologist in [place]. Patient denies any hemoptysis, recent loss of weight, fever chills. He is having occasional cough with minimal sputum production. He denies any recent history of exposure to pulmonary tuberculosis patients. Denies any recent travel history.  Reports has not seen his pulmonologist since beginning of Covid-19 pandemic      Past 24 hrs   -Sleeping on RA  -Arouses easily denies any SOB, cough, chest pain or wheezing  -HGB 8.6 down from 9.1  -Records from [facility] pending, reports has not seen his pulmonologist since beginning of Covid-19 pandemic  All other systems reviewed    PMHx   Past Medical History      Diagnosis Date    COPD (chronic obstructive pulmonary disease) ([facility] [address]     Coronary artery disease involving native coronary artery of native heart     History of blood transfusion     Hypertension       Past Surgical History        Procedure Laterality Date    BACK SURGERY      CAROTID-SUBCLAVIAN BYPASS GRAFT      COLONOSCOPY      CORONARY ANGIOPLASTY WITH STENT PLACEMENT      NECK SURGERY      carotid endarterectomy     Meds    Current Medications    cefTRIAXone (ROCEPHIN) IV  1,000 mg IntraVENous Q24H    iron sucrose  200 mg IntraVENous Q24H    lisinopril  20 mg Oral Daily    metoprolol succinate  25 mg Oral Daily    sennosides-docusate sodium  2 tablet Oral Nightly    polyethylene glycol  17 g Oral BID    docusate sodium  100 mg Oral BID    pantoprazole  40 mg Oral BID AC    thiamine  100 mg Oral Daily    folic acid  1 mg Oral Daily    sodium chloride flush  10 mL IntraVENous 2 times per day    [Held by provider] enoxaparin  40 mg SubCUTAneous Daily    nicotine  1 patch TransDERmal Daily    tamsulosin  0.4 mg Oral Daily    atorvastatin  40 mg Oral Nightly     sodium chloride, sodium chloride, sodium chloride flush, sodium chloride, ondansetron **OR** ondansetron, polyethylene glycol, acetaminophen **OR** acetaminophen, [Held by provider] oxyCODONE-acetaminophen, ipratropium-albuterol, [Held by provider] morphine  IV Drips/Infusions   sodium chloride      sodium chloride      sodium chloride       Home Medications  Medications Prior to Admission: simvastatin (ZOCOR) 20 MG tablet, Take 20 mg by mouth nightly  morphine (JAMEL) 30 MG extended release capsule, Take 30 mg by mouth 4 times daily.   metoprolol succinate (TOPROL XL) 25 MG extended release tablet, Take 25 mg by mouth daily  ferrous sulfate (IRON 325) 325 (65 Fe) MG tablet, Take 325 mg by mouth daily (with breakfast)  clopidogrel (PLAVIX) 75 MG tablet, Take 75 mg by mouth daily  oxyCODONE-acetaminophen (PERCOCET) 5-325 MG per tablet, Take 1 tablet by mouth every 4 hours as needed for Pain  aspirin 81 MG tablet, Take 81 mg by mouth daily  tamsulosin (FLOMAX) 0.4 MG capsule, Take 0.4 mg by mouth daily  lisinopril (PRINIVIL;ZESTRIL) 20 MG tablet, Take 20 mg by mouth daily  senna-docusate (PERICOLACE) 8.6-50 MG per tablet, Take 3 tablets by mouth daily   omeprazole (PRILOSEC) 20 MG capsule, Take 20 mg by mouth daily  sildenafil (VIAGRA) 100 MG tablet, Take 100 mg by mouth once a week  nystatin-triamcinolone (MYCOLOG) 975058-3.1 UNIT/GM-% ointment, Apply topically 2 times daily. [DISCONTINUED] morphine (MSIR) 15 MG tablet, Take 15 mg by mouth every 6 hours as needed for Pain  [DISCONTINUED] simvastatin (ZOCOR) 80 MG tablet, Take 80 mg by mouth nightly  Diet    Diet NPO  Allergies    Patient has no known allergies. Social History     Social History     Socioeconomic History    Marital status:      Spouse name: Not on file    Number of children: Not on file    Years of education: Not on file    Highest education level: Not on file   Occupational History    Not on file   Tobacco Use    Smoking status: Current Every Day Smoker     Packs/day: 0.50     Years: 65.00     Pack years: 32.50     Types: Cigarettes    Smokeless tobacco: Never Used   Vaping Use    Vaping Use: Never used   Substance and Sexual Activity    Alcohol use: Yes     Comment: devan 1-2 times a week    Drug use: No    Sexual activity: Not Currently   Other Topics Concern    Not on file   Social History Narrative    Not on file     Social Determinants of Health     Financial Resource Strain:     Difficulty of Paying Living Expenses:    Food Insecurity:     Worried About Running Out of Food in the Last Year:     Ran Out of Food in the Last Year:    Transportation Needs:     Lack of Transportation (Medical):      Lack of oz (52.8 kg)   09/02/21 0400 117 lb 3.2 oz (53.2 kg)   09/01/21 0345 116 lb (52.6 kg)       Exam   Physical Exam   Constitutional: No distress on RA. Patient appears chronically ill and thinly built. Head: Normocephalic and atraumatic. Mouth/Throat: Oropharynx is clear and moist.  No oral thrush. Eyes: Conjunctivae are normal. Pupils are equal, round. No scleral icterus. Neck: Neck supple. No tracheal deviation present. Cardiovascular: S1 and S2 with no murmur. No peripheral edema  Pulmonary/Chest: Normal effort with bilateral air entry, faint rhonchi resolved with cough. No stridor. No respiratory distress. Patient exhibits no tenderness. Abdominal: Soft. Bowel sounds audible. No distension or tenderness to palp. Musculoskeletal: Moves all extremities, noted low muscle tone to LE, noted deformity to BL feet  Neurological: Patient is sleeping arouses easily, follows simple commands      Labs  - Old records and notes have been reviewed in [facility]   ABG  Lab Results   Component Value Date    PH 7.43 09/01/2021    PO2 72 09/01/2021    PCO2 34 09/01/2021    HCO3 23 09/01/2021    O2SAT 95 09/01/2021     No results found for: IFIO2, MODE, SETTIDVOL, SETPEEP  CBC  Recent Labs     09/02/21  0524 09/02/21  0524 09/02/21  1753 09/03/21  0909 09/04/21  0544   WBC 11.0*  --   --  9.7 7.1   RBC 3.48*  --   --  4.37* 4.10*   HGB 6.6*   < > 8.8* 9.1* 8.6*   HCT 25.2*   < > 30.3* 33.4* 31.7*   MCV 72.4*  --   --  76.4* 77.3*   MCH 19.0*  --   --  20.8* 21.0*   MCHC 26.2*  --   --  27.2* 27.1*     --   --  179 182   MPV 10.5  --   --  10.2 9.7    < > = values in this interval not displayed. BMP  Recent Labs     09/02/21  0524 09/03/21  0909    139   K 4.4 4.2    104   CO2 24 26   BUN 16 10   CREATININE 1.0 1.0   GLUCOSE 96 113*   CALCIUM 8.6 9.1     LFT  No results for input(s): AST, ALT, ALB, BILITOT, ALKPHOS, LIPASE in the last 72 hours. Invalid input(s):   AMYLASE  TROP  Lab Results Component Value Date    TROPONINT < 0.010 08/31/2021     BNP  No results for input(s): BNP in the last 72 hours. Lactic Acid  No results for input(s): LACTA in the last 72 hours. INR  No results for input(s): INR, PROTIME in the last 72 hours. PTT  No results for input(s): APTT in the last 72 hours. Glucose  No results for input(s): POCGLU in the last 72 hours. UA   Recent Labs     09/01/21  2300   SPECGRAV >1.030*   PHUR 7.5   COLORU YELLOW   PROTEINU NEGATIVE   BLOODU NEGATIVE   RBCUA 0-2   WBCUA 25-50   BACTERIA MANY   NITRU POSITIVE*   BILIRUBINUR NEGATIVE   UROBILINOGEN 1.0   KETUA NEGATIVE   LABCAST NONE SEEN  NONE SEEN   . PFTs   Awaiting records    Sleep studies   None available    Cultures    Urine culture was positive for enteric gram negative bacilli. MRSA screen was negative  VRE screen was negative    EKG   EKG from 8/31/2021 shows a normal sinus rhythm ventricular rate of 69. There were no ST segment depressions or elevations. No T wave abnormalities to suggest acute ischemia. When compared to previous EKG no significant changes were identified. Echocardiogram   None available    Radiology    CXR      XR CHEST PORTABLE   8/31/2021   FINDINGS:  There are linear parenchymal densities demonstrated overlying the left mid to upper lung zone. Cannot exclude an underlying pulmonary lesion. Further characterization with chest CT is recommended. No pneumothorax is seen. No pleural effusion is demonstrated. The heart size is normal.  No acute osseous findings are seen. There are distended gas-filled bowel loops overlying the upper abdomen which are incompletely visualized. Further evaluation with abdominal radiograph can be obtained if clinically indicated. 1. There are linear parenchymal densities demonstrated overlying the left mid to upper lung zone. Cannot exclude an underlying pulmonary lesion. Further characterization with chest CT is recommended.     2. There are nonspecific distended partially visualized gas-filled bowel loops overlying the upper abdomen which are incompletely visualized. Further evaluation with abdominal radiograph can be obtained if clinically indicated. CT Scans    CT CHEST HIGH RESOLUTION   9/2/2021   FINDINGS: Noncontrast Right paratracheal lymphadenopathy with the 10 mm short axis enlarged node. Additional prominent left aortopulmonary window node and smaller lymph nodes in the mediastinum. Subcarinal adenopathy with a node measuring 1.7 cm short axis. No axillary lymphadenopathy. Heart size normal. Coronary artery calcifications are present. Right lung infiltrates demonstrated on CT abdomen pelvis are again seen use are inherent in right lower lobe with an associated small right pleural effusion which is increased compared to the previous exam. Cavitary spiculated lesion left apex thick stranding extending to the pleural surface and then toward the right hilum. There is now a left pleural effusion. There is mucoid appearing material in the distal trachea extending into the right mainstem bronchus. High-resolution images No evidence of interstitial lung disease. Bones Postsurgical changes lumbar spine and distal thoracic spine. No suspicious bone lesions. Impression:  1. Cavitary masslike lesion in the left apex measuring 2.1 cm. This may be infectious or neoplastic. 2. Right lower lobe airspace infiltrates and bilateral pleural effusions. 3. Mucoid appearing material in the trachea and right mainstem bronchus. CT ABDOMEN PELVIS W IV CONTRAST    9/1/2021   1. Right lower lung infiltrate. 2. Bilateral small effusions. 3. Cholelithiasis. 4. Mild atrophy of the left kidney. 5. Constipation. 6. Old appearing L1 anterior wedge compression fracture. 7. Small left hydrocele. 8. Enlarged prostate. 9. Distal aortic aneurysm measuring 3.8 cm with atherosclerotic plaque. True lumen measuring 1.7 cm.        (See actual reports for details)    Assessment   - 2.1 cm Cavitary mass on HRCT chest- reported history of left sided lung cancer for which he underwent radiation therapy in [place]  - Hx COPD unknown severity-Follows with Pulmonologist in [place]  - History of Lung Cancer-per pt will request records from [facility] in [place]  - Hypochromic Microcytic anemia-HGB 5.7 on initial presentation  - Infiltrate noted to right lower lobe- on Rocephin for UTI  - Bilateral pleural effusion L>R-differential includes malignant vs parapneumonic vs radiation induced lung injury   - Active tobacco smoker-Expresses no desire to quit  Plan   -Order placed for respiratory culture  -Change Duonebs to Q 4 hrs WA  -start Guaifenesin 600 mg PO BID  -Order placed to obtain medical records from [facility], will await records and determine clinical course after review, this can be completed in outpatient setting, after discussing with patient he reports he wishes to follow-up with his established [facility] Pulmonologist after discharge    -ATB's per primary currently on Rocephin for UTI  -NRT patch  -GI following planning EGD  -DVT prophylaxis SCDs       Questions and concerns addressed.     Electronically signed by   JUAN CARLOS Figueroa - CNP on 9/4/2021 at 7:44 AM
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
## 2021-09-04 NOTE — PROGRESS NOTES
Patient's IV removed, catheter intact. Patient was dressed and belongings were packed and taken home by the patient. I gave and went over discharge instructions with the patient and his spouse, there were no further questions at this time. Careplans and education were completed. Patient was taken down by wheel chair by this RN and got into sons vehicle to go home.  Electronically signed by Leticia Lugo RN on 9/4/2021 at 3:34 PM

## 2021-09-04 NOTE — PROGRESS NOTES
Pt Name: Keren Ortega  MRN: [id number]  [id number]  YOB: 1950  Admit Date: 8/31/2021  3:24 PM  Date of evaluation: 9/4/2021  Primary Care Physician: Oda Severs, MD   [id number]     INES    S. Pt drank half of colonoscopy prep and had 3 BM's. Pt refuses further prep and colonoscopy. O.     Vitals:    09/04/21 0845   BP: (!) 141/63   Pulse: 55   Resp: 16   Temp: 97.5 °F (36.4 °C)   SpO2: 95%       Body mass index is 17.7 kg/m². Awake and alert   clear to auscultation bilaterally   regular rate and rhythm   Soft and nondistended with normal BS, nontender   no cyanosis, clubbing or edema present      Current Meds    cefTRIAXone (ROCEPHIN) IV  1,000 mg IntraVENous Q24H    iron sucrose  200 mg IntraVENous Q24H    lisinopril  20 mg Oral Daily    metoprolol succinate  25 mg Oral Daily    sennosides-docusate sodium  2 tablet Oral Nightly    polyethylene glycol  17 g Oral BID    docusate sodium  100 mg Oral BID    pantoprazole  40 mg Oral BID AC    thiamine  100 mg Oral Daily    folic acid  1 mg Oral Daily    sodium chloride flush  10 mL IntraVENous 2 times per day    [Held by provider] enoxaparin  40 mg SubCUTAneous Daily    nicotine  1 patch TransDERmal Daily    tamsulosin  0.4 mg Oral Daily    atorvastatin  40 mg Oral Nightly      sodium chloride      sodium chloride      sodium chloride       Diet: Diet NPO     A.  66 y.o. male with chronic pain on narcotic analgesics and CAD s/p PCI 2006 on ASA and Plavix admitted 8/31/21 for profound anemia and abd distension. EGD 9/1/21 demonstrated small hiatal hernia and LA grade B esophagitis w/o reason for profound anemia.     Anemia, profound  ASA and plavix use  Narcotic analgesic use  Opioid-induced constipation    P.      Pt now unwilling to do colonoscopy  Resume diet, high fiber  Pls call GI with issues, signing off    Yared Merrill MD, MD  9:33 AM 9/4/2021

## 2021-09-04 NOTE — PROGRESS NOTES
Report hand off given to primary nurse Praveen Daniels. Pt resting in semi fowlers position with eyes open, breathing easy and unlabored.   Electronically signed by Breana Garrett on 9/3/2021 at 11:33 PM  SN/RSC

## 2021-09-04 NOTE — FLOWSHEET NOTE
09/03/21 2106   Provider Notification   Reason for Communication Review case   Provider Name Ned   Provider Notification Physician   Method of Communication Secure Message   Response See orders   Notification Time 1950     Clarified orders for pending Colonoscopy in the AM with Dr. Peyton Reaves. Orders given to remain clear liquid diet and not be NPO at midnight, stop bowel prep, and give tap water enemas.

## 2021-09-04 NOTE — PROGRESS NOTES
Patient is refusing staff to administer tap water enemas at this time. Pt states \"I've been shitting all night! I'm not drinking any more of that shit and I'm not getting a colonoscopy. \" This RN had a lengthy discussion with the patient about the importance of having the colonoscopy to potentially find the source of the bleed, and he is [facility] done with the prep. He stated \"I had a colonoscopy a few months ago and there was nothing. They aren't going to find anything new. This RN stated a lot can happen in a few months, and that he may continue to bleed internally if the source is not found. The patient stated \"I just want to go home. \" This RN educated the patient of the importance of having the procedure done, with the patient continuing to refuse afterwards.

## 2022-04-05 ENCOUNTER — HOSPITAL ENCOUNTER (EMERGENCY)
Age: 72
Discharge: HOME | End: 2022-04-05
Attending: FAMILY MEDICINE
Payer: OTHER GOVERNMENT

## 2022-04-05 ENCOUNTER — APPOINTMENT (OUTPATIENT)
Dept: GENERAL RADIOLOGY | Age: 72
End: 2022-04-05
Payer: OTHER GOVERNMENT

## 2022-04-05 VITALS
DIASTOLIC BLOOD PRESSURE: 71 MMHG | HEART RATE: 62 BPM | BODY MASS INDEX: 16.22 KG/M2 | RESPIRATION RATE: 18 BRPM | SYSTOLIC BLOOD PRESSURE: 144 MMHG | WEIGHT: 107 LBS | OXYGEN SATURATION: 98 % | TEMPERATURE: 97.5 F | HEIGHT: 68 IN

## 2022-04-05 DIAGNOSIS — I95.1 ORTHOSTATIC HYPOTENSION: Primary | ICD-10-CM

## 2022-04-05 LAB
ALBUMIN SERPL-MCNC: 2.8 G/DL (ref 3.5–5.1)
ALP BLD-CCNC: 79 U/L (ref 38–126)
ALT SERPL-CCNC: < 5 U/L (ref 11–66)
ANION GAP SERPL CALCULATED.3IONS-SCNC: 11 MEQ/L (ref 8–16)
AST SERPL-CCNC: 10 U/L (ref 5–40)
BASOPHILS # BLD: 0.6 %
BASOPHILS ABSOLUTE: 0 THOU/MM3 (ref 0–0.1)
BILIRUB SERPL-MCNC: 0.4 MG/DL (ref 0.3–1.2)
BUN BLDV-MCNC: 17 MG/DL (ref 7–22)
CALCIUM SERPL-MCNC: 8 MG/DL (ref 8.5–10.5)
CHLORIDE BLD-SCNC: 111 MEQ/L (ref 98–111)
CO2: 22 MEQ/L (ref 23–33)
CREAT SERPL-MCNC: 0.9 MG/DL (ref 0.4–1.2)
EKG ATRIAL RATE: 62 BPM
EKG P AXIS: 73 DEGREES
EKG P-R INTERVAL: 134 MS
EKG Q-T INTERVAL: 448 MS
EKG QRS DURATION: 72 MS
EKG QTC CALCULATION (BAZETT): 454 MS
EKG R AXIS: 87 DEGREES
EKG T AXIS: 87 DEGREES
EKG VENTRICULAR RATE: 62 BPM
EOSINOPHIL # BLD: 3.7 %
EOSINOPHILS ABSOLUTE: 0.2 THOU/MM3 (ref 0–0.4)
ERYTHROCYTE [DISTWIDTH] IN BLOOD BY AUTOMATED COUNT: 14.2 % (ref 11.5–14.5)
ERYTHROCYTE [DISTWIDTH] IN BLOOD BY AUTOMATED COUNT: 50.9 FL (ref 35–45)
FLU A ANTIGEN: NEGATIVE
FLU B ANTIGEN: NEGATIVE
GFR SERPL CREATININE-BSD FRML MDRD: 83 ML/MIN/1.73M2
GLUCOSE BLD-MCNC: 107 MG/DL (ref 70–108)
HCT VFR BLD CALC: 34.3 % (ref 42–52)
HEMOGLOBIN: 10.9 GM/DL (ref 14–18)
IMMATURE GRANS (ABS): 0.07 THOU/MM3 (ref 0–0.07)
IMMATURE GRANULOCYTES: 1.1 %
LYMPHOCYTES # BLD: 18.3 %
LYMPHOCYTES ABSOLUTE: 1.2 THOU/MM3 (ref 1–4.8)
MCH RBC QN AUTO: 31 PG (ref 26–33)
MCHC RBC AUTO-ENTMCNC: 31.8 GM/DL (ref 32.2–35.5)
MCV RBC AUTO: 97.4 FL (ref 80–94)
MONOCYTES # BLD: 6.3 %
MONOCYTES ABSOLUTE: 0.4 THOU/MM3 (ref 0.4–1.3)
NUCLEATED RED BLOOD CELLS: 0 /100 WBC
OSMOLALITY CALCULATION: 288.9 MOSMOL/KG (ref 275–300)
PLATELET # BLD: 139 THOU/MM3 (ref 130–400)
PMV BLD AUTO: 9.8 FL (ref 9.4–12.4)
POTASSIUM REFLEX MAGNESIUM: 4.2 MEQ/L (ref 3.5–5.2)
PRO-BNP: 556.2 PG/ML (ref 0–900)
PROCALCITONIN: 0.05 NG/ML (ref 0.01–0.09)
RBC # BLD: 3.52 MILL/MM3 (ref 4.7–6.1)
SARS-COV-2, NAAT: NOT DETECTED
SEG NEUTROPHILS: 70 %
SEGMENTED NEUTROPHILS ABSOLUTE COUNT: 4.6 THOU/MM3 (ref 1.8–7.7)
SODIUM BLD-SCNC: 144 MEQ/L (ref 135–145)
TOTAL PROTEIN: 5 G/DL (ref 6.1–8)
TROPONIN T: < 0.01 NG/ML
WBC # BLD: 6.5 THOU/MM3 (ref 4.8–10.8)

## 2022-04-05 PROCEDURE — 85025 COMPLETE CBC W/AUTO DIFF WBC: CPT

## 2022-04-05 PROCEDURE — 93010 ELECTROCARDIOGRAM REPORT: CPT | Performed by: INTERNAL MEDICINE

## 2022-04-05 PROCEDURE — 80053 COMPREHEN METABOLIC PANEL: CPT

## 2022-04-05 PROCEDURE — 87635 SARS-COV-2 COVID-19 AMP PRB: CPT

## 2022-04-05 PROCEDURE — 87804 INFLUENZA ASSAY W/OPTIC: CPT

## 2022-04-05 PROCEDURE — 83880 ASSAY OF NATRIURETIC PEPTIDE: CPT

## 2022-04-05 PROCEDURE — 2580000003 HC RX 258: Performed by: STUDENT IN AN ORGANIZED HEALTH CARE EDUCATION/TRAINING PROGRAM

## 2022-04-05 PROCEDURE — 36415 COLL VENOUS BLD VENIPUNCTURE: CPT

## 2022-04-05 PROCEDURE — 84145 PROCALCITONIN (PCT): CPT

## 2022-04-05 PROCEDURE — 93005 ELECTROCARDIOGRAM TRACING: CPT | Performed by: FAMILY MEDICINE

## 2022-04-05 PROCEDURE — 84484 ASSAY OF TROPONIN QUANT: CPT

## 2022-04-05 PROCEDURE — 99284 EMERGENCY DEPT VISIT MOD MDM: CPT

## 2022-04-05 PROCEDURE — 71045 X-RAY EXAM CHEST 1 VIEW: CPT

## 2022-04-05 RX ORDER — 0.9 % SODIUM CHLORIDE 0.9 %
1000 INTRAVENOUS SOLUTION INTRAVENOUS ONCE
Status: COMPLETED | OUTPATIENT
Start: 2022-04-05 | End: 2022-04-05

## 2022-04-05 RX ADMIN — SODIUM CHLORIDE 1000 ML: 9 INJECTION, SOLUTION INTRAVENOUS at 12:45

## 2022-04-05 ASSESSMENT — ENCOUNTER SYMPTOMS
NAUSEA: 0
ABDOMINAL PAIN: 0
DIARRHEA: 0
EYE PAIN: 0
COUGH: 1
CONSTIPATION: 0
VOMITING: 0
BACK PAIN: 0
SHORTNESS OF BREATH: 0
SINUS PAIN: 0

## 2022-04-05 NOTE — ED PROVIDER NOTES
[address] [address]          Pt Name: Jaida Brock  MRN: [id number]  [place] 1950  Date of evaluation: 4/5/2022  Treating Resident Physician: Shara Cox MD  Supervising Physician: Dr. Roberta Diaz MD    [address] [place] [address]       Chief Complaint   Patient presents with    Other     Low Bp     History obtained from the patient. HISTORY OF PRESENT ILLNESS    HPI  Jaida Brock is a 66 y.o. male who presents to the emergency department for evaluation of hypotension. Patient states he was at the [place] when he was sent over here. He states he does not know why he is sent over here in does not want to be here. Nursing staff states that he was at the [facility] and he was sent over for hypotension. he does state that he \"has a cold \". He states he has a productive cough without hemoptysis. He denies any headache fever chills. Denies any shortness of breath or chest pain. Denies any abdominal pain nausea vomiting diarrhea constipation leg swelling. Denies any previous CVA or blood clots. He states he has had a MI in the past.  Denies any recent surgery or recent long distance travel. Denies any history of malignancy. Denies any hormone use. States his appetite is fine. Denies any syncope. Patient denies any new Headache, Fever, Chills, Chest pain, Shortness of breath, Abdominal pain, Nausea, Vomiting, Diarrhea, Constipation and Leg swelling. The patient has no other acute complaints at this time. REVIEW OF SYSTEMS   Review of Systems   Constitutional: Negative for chills and fever. HENT: Negative for ear pain and sinus pain. Eyes: Negative for pain. Respiratory: Positive for cough. Negative for shortness of breath. Cardiovascular: Negative for chest pain and leg swelling. Gastrointestinal: Negative for abdominal pain, constipation, diarrhea, nausea and vomiting.    Genitourinary: Negative for dysuria and flank pain. Musculoskeletal: Negative for back pain and neck pain. Skin: Negative for wound. Neurological: Negative for headaches. Psychiatric/Behavioral: Negative for confusion. PAST MEDICAL AND SURGICAL HISTORY     Past Medical History:   Diagnosis Date    COPD [facility]chronic obstructive pulmonary disease) ([facility] [address]     Coronary artery disease involving native coronary artery of native heart     History of blood transfusion     Hypertension      Past Surgical History:   Procedure Laterality Date    BACK SURGERY      CAROTID-SUBCLAVIAN BYPASS GRAFT      COLONOSCOPY      CORONARY ANGIOPLASTY WITH STENT PLACEMENT      NECK SURGERY      carotid endarterectomy    UPPER GASTROINTESTINAL ENDOSCOPY N/A 9/1/2021    EGD ESOPHAGOGASTRODUODENOSCOPY performed by Sarah Lucero MD at [address] [place] [address]   No current facility-administered medications for this encounter. Current Outpatient Medications:     docusate sodium (COLACE) 100 MG capsule, Take 1 capsule by mouth 2 times daily, Disp: 60 capsule, Rfl: 1    folic acid (FOLVITE) 1 MG tablet, Take 1 tablet by mouth daily, Disp: 30 tablet, Rfl: 3    thiamine 100 MG tablet, Take 1 tablet by mouth daily, Disp: 30 tablet, Rfl: 3    pantoprazole (PROTONIX) 40 MG tablet, Take 1 tablet by mouth 2 times daily (before meals), Disp: 30 tablet, Rfl: 3    simvastatin (ZOCOR) 20 MG tablet, Take 20 mg by mouth nightly, Disp: , Rfl:     metoprolol succinate (TOPROL XL) 25 MG extended release tablet, Take 25 mg by mouth daily, Disp: , Rfl:     ferrous sulfate (IRON 325) 325 (65 Fe) MG tablet, Take 325 mg by mouth daily (with breakfast), Disp: , Rfl:     nystatin-triamcinolone (MYCOLOG) 312418-1.1 UNIT/GM-% ointment, Apply topically 2 times daily. , Disp: 1 Tube, Rfl: 0    oxyCODONE-acetaminophen (PERCOCET) 5-325 MG per tablet, Take 1 tablet by mouth every 4 hours as needed for Pain, Disp: , Rfl:     tamsulosin (FLOMAX) 0.4 MG capsule, Take 0.4 mg by mouth daily, Disp: , Rfl:     lisinopril (PRINIVIL;ZESTRIL) 20 MG tablet, Take 20 mg by mouth daily, Disp: , Rfl:     senna-docusate (PERICOLACE) 8.6-50 MG per tablet, Take 3 tablets by mouth daily , Disp: , Rfl:     sildenafil (VIAGRA) 100 MG tablet, Take 100 mg by mouth once a week, Disp: , Rfl:       SOCIAL HISTORY     Social History     Social History Narrative    Not on file     Social History     Tobacco Use    Smoking status: Current Every Day Smoker     Packs/day: 0.50     Years: 65.00     Pack years: 32.50     Types: Cigarettes    Smokeless tobacco: Never Used   Vaping Use    Vaping Use: Never used   Substance Use Topics    Alcohol use: Yes     Comment: whiskey 1-2 times a week    Drug use: No         ALLERGIES   No Known Allergies      FAMILY HISTORY   No family history on file. PREVIOUS RECORDS   Previous records reviewed: Patient was seen last on 8/31/2021 for anemia. PHYSICAL EXAM     ED Triage Vitals [04/05/22 1219]   BP Temp Temp Source Pulse Resp SpO2 Height Weight   120/77 97.5 °F (36.4 °C) Oral 56 16 100 % -- --     Initial vital signs and nursing assessment reviewed and vitals are/show: abnormal from Bradycardic. Pulsoximetry is normal per my interpretation. Additional Vital Signs:  Vitals:    04/05/22 1403   BP: (!) 144/71   Pulse: 62   Resp:    Temp:    SpO2:        Physical Exam  Constitutional:       General: He is not in acute distress. Appearance: Normal appearance. He is not ill-appearing, toxic-appearing or diaphoretic. HENT:      Head: Normocephalic and atraumatic. Right Ear: External ear normal.      Left Ear: External ear normal.   Eyes:      General: No scleral icterus. Right eye: No discharge. Left eye: No discharge. Cardiovascular:      Rate and Rhythm: Normal rate and regular rhythm. Pulmonary:      Effort: Pulmonary effort is normal. No respiratory distress. Breath sounds: Normal breath sounds. No stridor.  No wheezing, rhonchi or rales. Chest:      Chest wall: No tenderness. Abdominal:      General: Abdomen is flat. There is no distension. Palpations: Abdomen is soft. Tenderness: There is no abdominal tenderness. There is no guarding or rebound. Musculoskeletal:      Cervical back: Neck supple. Right lower leg: No edema. Left lower leg: No edema. Skin:     General: Skin is warm and dry. Neurological:      Mental Status: He is alert and oriented to person, place, and time. Mental status is at baseline. Psychiatric:         Mood and Affect: Mood normal.         Behavior: Behavior normal.         Thought Content: Thought content normal.         Judgment: Judgment normal.             MEDICAL DECISION MAKING   Initial Assessment:     75-year-old male presenting to the ED for hypotension    Differential diagnoses include but not limited to: Arrhythmia electrolyte abnormality ACS CHF COPD orthostatic vasovagal neurogenic cardiogenic syncope infection UTI pneumonia Covid influenza     Plan:       EKG  Labs  Imaging: CXR  IV fluids  Orthostatic vital signs  Discharge          Patient is a 66 y.o. male who was seen and evaluated in the emergency department for HTN. Reportedly patient had a syncopal event at outside facility was found to be hypotensive. In our ED patient declined this and stated he did have a cold. He was not hypotensive but showed sinus bradycardia on telemetry. EKG showed no signs of acute ischemia. Lab work was unremarkable. Orthostatic vital signs showed a change in his diastolic blood pressure from lying to sitting out of 10 suggestive of static hypotension. We did give IV fluids here in the ED. Imaging was unremarkable. I explained the findings with the patient and offered additional IV fluids here in the ED versus discharge with oral rehydration outpatient. Patient preferred to be discharged wanted to increase his fluids patient verbalized understanding and agreement.   Patient was discharged. ED RESULTS   Laboratory results:  Labs Reviewed   CBC WITH AUTO DIFFERENTIAL - Abnormal; Notable for the following components:       Result Value    RBC 3.52 (*)     Hemoglobin 10.9 (*)     Hematocrit 34.3 (*)     MCV 97.4 (*)     MCHC 31.8 (*)     RDW-SD 50.9 (*)     All other components within normal limits   COMPREHENSIVE METABOLIC PANEL W/ REFLEX TO MG FOR LOW K - Abnormal; Notable for the following components:    CO2 22 (*)     Calcium 8.0 (*)     Total Protein 5.0 (*)     Albumin 2.8 (*)     ALT <5 (*)     All other components within normal limits   GLOMERULAR FILTRATION RATE, ESTIMATED - Abnormal; Notable for the following components:    Est, Glom Filt Rate 83 (*)     All other components within normal limits   COVID-19, RAPID   RAPID INFLUENZA A/B ANTIGENS   TROPONIN   BRAIN NATRIURETIC PEPTIDE   PROCALCITONIN   ANION GAP   OSMOLALITY       Radiologic studies results:  XR CHEST PORTABLE   Final Result   1. No acute pulmonary infiltrate. 2. Areas of scarring and/or subsegmental atelectasis seen in the right lung base and left midlung zone. **This report has been created using voice recognition software. It may contain minor errors which are inherent in voice recognition technology. **      Final report electronically signed by Dr Meghan Diaz on 4/5/2022 12:51 PM          ED Medications administered this visit:   Medications   0.9 % sodium chloride bolus (0 mLs IntraVENous Stopped 4/5/22 1404)         ED COURSE     ED Course as of 04/05/22 1428   Tue Apr 05, 2022   1309 CXR:IMPRESSION:  1. No acute pulmonary infiltrate.     2. Areas of scarring and/or subsegmental atelectasis seen in the right lung base and left midlung zone. [CR]   8927 Per nursing, Patient /71 lying and 121/81 sitting.   DBP increase 10 mmhg suggestive of orthostasis [CR]   1401 COVID negativeInfluenza negativeTroponin procalcitonin BNP within normal limitsCMP unremarkableCBC shows stable hemoglobin [CR]   1404 Discussed results with patient, explained orthostatic hypotension, offered additional IV fluids here in the emergency department versus discharge with emphasis on oral rehydration. Patient wants to go home and will increase his oral intake. [CR]      ED Course User Index  [CR] Bonni Boas, MD        Strict return precautions and follow up instructions were discussed with the patient prior to discharge, with which the patient agrees. MEDICATION CHANGES     New Prescriptions    No medications on file         FINAL DISPOSITION     Final diagnoses:   Orthostatic hypotension     Condition: condition: stable  Dispo: Discharge to home      This transcription was electronically signed. Parts of this transcriptions may have been dictated by use of voice recognition software and electronically transcribed, and parts may have been transcribed with the assistance of an ED scribe. The transcription may contain errors not detected in proofreading. Please refer to my supervising physician's documentation if my documentation differs.     Electronically Signed: Bonni Boas, MD, 04/05/22, 2:28 PM         Bonni Boas, MD  Resident  04/05/22 0568

## 2022-04-05 NOTE — ED NOTES
Pt presents via ems from [facility]. They report he was there for not feeling well. At [place] in had an unresponsive episode and BP was found to be very low. Squad reports pt now A/Ox4. They gave fluid bolus in route. Pt on arrival A/O. He denies any symptoms currently. VSS. IV place via squad. Infusion stopped.       Celina Wyatt RN  04/05/22 3833

## 2023-03-29 ENCOUNTER — HOSPITAL ENCOUNTER (OUTPATIENT)
Dept: CT IMAGING | Age: 73
Discharge: HOME | End: 2023-03-29
Payer: OTHER GOVERNMENT

## 2023-03-29 DIAGNOSIS — Z85.118 PERSONAL HISTORY OF MALIGNANT NEOPLASM OF BRONCHUS AND LUNG: ICD-10-CM

## 2023-03-29 PROCEDURE — 71250 CT THORAX DX C-: CPT

## 2023-06-24 ENCOUNTER — APPOINTMENT (OUTPATIENT)
Dept: GENERAL RADIOLOGY | Age: 73
End: 2023-06-24
Payer: OTHER GOVERNMENT

## 2023-06-24 ENCOUNTER — APPOINTMENT (OUTPATIENT)
Dept: CT IMAGING | Age: 73
End: 2023-06-24
Payer: OTHER GOVERNMENT

## 2023-06-24 ENCOUNTER — HOSPITAL ENCOUNTER (INPATIENT)
Age: 73
LOS: 7 days | Discharge: HOME | End: 2023-07-01
Attending: EMERGENCY MEDICINE | Admitting: INTERNAL MEDICINE
Payer: OTHER GOVERNMENT

## 2023-06-24 ENCOUNTER — APPOINTMENT (OUTPATIENT)
Dept: INTERVENTIONAL RADIOLOGY/VASCULAR | Age: 73
End: 2023-06-24
Payer: OTHER GOVERNMENT

## 2023-06-24 DIAGNOSIS — J44.9 COPD WITHOUT EXACERBATION (HCC): ICD-10-CM

## 2023-06-24 DIAGNOSIS — I96 NECROSIS (HCC): ICD-10-CM

## 2023-06-24 DIAGNOSIS — I99.8 ACUTE LOWER LIMB ISCHEMIA: ICD-10-CM

## 2023-06-24 DIAGNOSIS — L03.90 CELLULITIS, UNSPECIFIED CELLULITIS SITE: ICD-10-CM

## 2023-06-24 DIAGNOSIS — I99.8 ISCHEMIA OF LEFT LOWER EXTREMITY: ICD-10-CM

## 2023-06-24 DIAGNOSIS — R29.898 WEAKNESS OF BOTH LOWER EXTREMITIES: ICD-10-CM

## 2023-06-24 DIAGNOSIS — I96 DRY GANGRENE (HCC): ICD-10-CM

## 2023-06-24 DIAGNOSIS — I73.9 PAD (PERIPHERAL ARTERY DISEASE) (HCC): Primary | ICD-10-CM

## 2023-06-24 LAB
ALBUMIN SERPL BCG-MCNC: 3.6 G/DL (ref 3.5–5.1)
ALP SERPL-CCNC: 116 U/L (ref 38–126)
ALT SERPL W/O P-5'-P-CCNC: 8 U/L (ref 11–66)
ANION GAP SERPL CALC-SCNC: 11 MEQ/L (ref 8–16)
ANION GAP SERPL CALC-SCNC: 16 MEQ/L (ref 8–16)
APTT PPP: 29.5 SECONDS (ref 22–38)
AST SERPL-CCNC: 13 U/L (ref 5–40)
BASOPHILS ABSOLUTE: 0 THOU/MM3 (ref 0–0.1)
BASOPHILS NFR BLD AUTO: 0.3 %
BILIRUB SERPL-MCNC: 0.3 MG/DL (ref 0.3–1.2)
BUN SERPL-MCNC: 32 MG/DL (ref 7–22)
BUN SERPL-MCNC: 36 MG/DL (ref 7–22)
CALCIUM SERPL-MCNC: 8.8 MG/DL (ref 8.5–10.5)
CALCIUM SERPL-MCNC: 9.5 MG/DL (ref 8.5–10.5)
CHLORIDE SERPL-SCNC: 97 MEQ/L (ref 98–111)
CHLORIDE SERPL-SCNC: 99 MEQ/L (ref 98–111)
CO2 SERPL-SCNC: 21 MEQ/L (ref 23–33)
CO2 SERPL-SCNC: 26 MEQ/L (ref 23–33)
CREAT SERPL-MCNC: 1 MG/DL (ref 0.4–1.2)
CREAT SERPL-MCNC: 1.2 MG/DL (ref 0.4–1.2)
CRP SERPL-MCNC: 7.77 MG/DL (ref 0–1)
DEPRECATED RDW RBC AUTO: 47.6 FL (ref 35–45)
EOSINOPHIL NFR BLD AUTO: 0.5 %
EOSINOPHILS ABSOLUTE: 0.1 THOU/MM3 (ref 0–0.4)
ERYTHROCYTE [DISTWIDTH] IN BLOOD BY AUTOMATED COUNT: 13.6 % (ref 11.5–14.5)
ERYTHROCYTE [SEDIMENTATION RATE] IN BLOOD BY WESTERGREN METHOD: 69 MM/HR (ref 0–10)
GFR SERPL CREATININE-BSD FRML MDRD: > 60 ML/MIN/1.73M2
GFR SERPL CREATININE-BSD FRML MDRD: > 60 ML/MIN/1.73M2
GLUCOSE SERPL-MCNC: 100 MG/DL (ref 70–108)
GLUCOSE SERPL-MCNC: 105 MG/DL (ref 70–108)
HCT VFR BLD AUTO: 36.1 % (ref 42–52)
HCT VFR BLD AUTO: 39.7 % (ref 42–52)
HEPARIN UNFRACTIONATED: 0.05 U/ML (ref 0.3–0.7)
HGB BLD-MCNC: 11.2 GM/DL (ref 14–18)
HGB BLD-MCNC: 12.6 GM/DL (ref 14–18)
IMM GRANULOCYTES # BLD AUTO: 0.16 THOU/MM3 (ref 0–0.07)
IMM GRANULOCYTES NFR BLD AUTO: 1.1 %
INR PPP: 0.83 (ref 0.85–1.13)
LACTATE SERPL-SCNC: 2.5 MMOL/L (ref 0.5–2)
LACTIC ACID, SEPSIS: 1.7 MMOL/L (ref 0.5–1.9)
LYMPHOCYTES ABSOLUTE: 1 THOU/MM3 (ref 1–4.8)
LYMPHOCYTES NFR BLD AUTO: 6.8 %
MCH RBC QN AUTO: 30.1 PG (ref 26–33)
MCHC RBC AUTO-ENTMCNC: 31.7 GM/DL (ref 32.2–35.5)
MCV RBC AUTO: 94.7 FL (ref 80–94)
MONOCYTES ABSOLUTE: 0.9 THOU/MM3 (ref 0.4–1.3)
MONOCYTES NFR BLD AUTO: 6 %
NEUTROPHILS NFR BLD AUTO: 85.3 %
NRBC BLD AUTO-RTO: 0 /100 WBC
OSMOLALITY SERPL CALC.SUM OF ELEC: 275.5 MOSMOL/KG (ref 275–300)
OSMOLALITY SERPL CALC.SUM OF ELEC: 280.4 MOSMOL/KG (ref 275–300)
PH BLDV: 7.33 [PH] (ref 7.31–7.41)
PLATELET # BLD AUTO: 268 THOU/MM3 (ref 130–400)
PMV BLD AUTO: 9.7 FL (ref 9.4–12.4)
POTASSIUM SERPL-SCNC: 4 MEQ/L (ref 3.5–5.2)
POTASSIUM SERPL-SCNC: 4.5 MEQ/L (ref 3.5–5.2)
PROCALCITONIN SERPL IA-MCNC: 0.08 NG/ML (ref 0.01–0.09)
PROT SERPL-MCNC: 6.5 G/DL (ref 6.1–8)
RBC # BLD AUTO: 4.19 MILL/MM3 (ref 4.7–6.1)
SEGMENTED NEUTROPHILS ABSOLUTE COUNT: 12.4 THOU/MM3 (ref 1.8–7.7)
SODIUM SERPL-SCNC: 134 MEQ/L (ref 135–145)
SODIUM SERPL-SCNC: 136 MEQ/L (ref 135–145)
TROPONIN T: < 0.01 NG/ML
WBC # BLD AUTO: 14.5 THOU/MM3 (ref 4.8–10.8)

## 2023-06-24 PROCEDURE — 99255 IP/OBS CONSLTJ NEW/EST HI 80: CPT | Performed by: THORACIC SURGERY (CARDIOTHORACIC VASCULAR SURGERY)

## 2023-06-24 PROCEDURE — 85651 RBC SED RATE NONAUTOMATED: CPT

## 2023-06-24 PROCEDURE — 96365 THER/PROPH/DIAG IV INF INIT: CPT

## 2023-06-24 PROCEDURE — 36415 COLL VENOUS BLD VENIPUNCTURE: CPT

## 2023-06-24 PROCEDURE — 99222 1ST HOSP IP/OBS MODERATE 55: CPT | Performed by: NURSE PRACTITIONER

## 2023-06-24 PROCEDURE — 96375 TX/PRO/DX INJ NEW DRUG ADDON: CPT

## 2023-06-24 PROCEDURE — 87147 CULTURE TYPE IMMUNOLOGIC: CPT

## 2023-06-24 PROCEDURE — 87070 CULTURE OTHR SPECIMN AEROBIC: CPT

## 2023-06-24 PROCEDURE — 84484 ASSAY OF TROPONIN QUANT: CPT

## 2023-06-24 PROCEDURE — 85018 HEMOGLOBIN: CPT

## 2023-06-24 PROCEDURE — 1200000003 HC TELEMETRY R&B

## 2023-06-24 PROCEDURE — 84145 PROCALCITONIN (PCT): CPT

## 2023-06-24 PROCEDURE — 2580000003 HC RX 258: Performed by: STUDENT IN AN ORGANIZED HEALTH CARE EDUCATION/TRAINING PROGRAM

## 2023-06-24 PROCEDURE — 6360000004 HC RX CONTRAST MEDICATION: Performed by: EMERGENCY MEDICINE

## 2023-06-24 PROCEDURE — 85730 THROMBOPLASTIN TIME PARTIAL: CPT

## 2023-06-24 PROCEDURE — 87205 SMEAR GRAM STAIN: CPT

## 2023-06-24 PROCEDURE — 87075 CULTR BACTERIA EXCEPT BLOOD: CPT

## 2023-06-24 PROCEDURE — 85014 HEMATOCRIT: CPT

## 2023-06-24 PROCEDURE — 6370000000 HC RX 637 (ALT 250 FOR IP): Performed by: STUDENT IN AN ORGANIZED HEALTH CARE EDUCATION/TRAINING PROGRAM

## 2023-06-24 PROCEDURE — 85025 COMPLETE CBC W/AUTO DIFF WBC: CPT

## 2023-06-24 PROCEDURE — 73620 X-RAY EXAM OF FOOT: CPT

## 2023-06-24 PROCEDURE — 2580000003 HC RX 258: Performed by: NURSE PRACTITIONER

## 2023-06-24 PROCEDURE — 87040 BLOOD CULTURE FOR BACTERIA: CPT

## 2023-06-24 PROCEDURE — 80053 COMPREHEN METABOLIC PANEL: CPT

## 2023-06-24 PROCEDURE — 82800 BLOOD PH: CPT

## 2023-06-24 PROCEDURE — 85610 PROTHROMBIN TIME: CPT

## 2023-06-24 PROCEDURE — 86140 C-REACTIVE PROTEIN: CPT

## 2023-06-24 PROCEDURE — 87077 CULTURE AEROBIC IDENTIFY: CPT

## 2023-06-24 PROCEDURE — 6370000000 HC RX 637 (ALT 250 FOR IP): Performed by: NURSE PRACTITIONER

## 2023-06-24 PROCEDURE — 85520 HEPARIN ASSAY: CPT

## 2023-06-24 PROCEDURE — 6360000002 HC RX W HCPCS: Performed by: STUDENT IN AN ORGANIZED HEALTH CARE EDUCATION/TRAINING PROGRAM

## 2023-06-24 PROCEDURE — 75635 CT ANGIO ABDOMINAL ARTERIES: CPT

## 2023-06-24 PROCEDURE — 83605 ASSAY OF LACTIC ACID: CPT

## 2023-06-24 PROCEDURE — 73600 X-RAY EXAM OF ANKLE: CPT

## 2023-06-24 PROCEDURE — 93005 ELECTROCARDIOGRAM TRACING: CPT | Performed by: STUDENT IN AN ORGANIZED HEALTH CARE EDUCATION/TRAINING PROGRAM

## 2023-06-24 PROCEDURE — 87186 SC STD MICRODIL/AGAR DIL: CPT

## 2023-06-24 PROCEDURE — 99285 EMERGENCY DEPT VISIT HI MDM: CPT

## 2023-06-24 PROCEDURE — 93926 LOWER EXTREMITY STUDY: CPT

## 2023-06-24 RX ORDER — 0.9 % SODIUM CHLORIDE 0.9 %
30 INTRAVENOUS SOLUTION INTRAVENOUS ONCE
Status: COMPLETED | OUTPATIENT
Start: 2023-06-24 | End: 2023-06-25

## 2023-06-24 RX ORDER — MORPHINE SULFATE 15 MG/1
15 TABLET ORAL 2 TIMES DAILY
Status: DISCONTINUED | OUTPATIENT
Start: 2023-06-24 | End: 2023-07-01 | Stop reason: HOSPADM

## 2023-06-24 RX ORDER — ASPIRIN 81 MG/1
81 TABLET, CHEWABLE ORAL DAILY
Status: DISCONTINUED | OUTPATIENT
Start: 2023-06-25 | End: 2023-07-01 | Stop reason: HOSPADM

## 2023-06-24 RX ORDER — ASCORBIC ACID 500 MG
250 TABLET ORAL DAILY
Status: DISCONTINUED | OUTPATIENT
Start: 2023-06-25 | End: 2023-07-01 | Stop reason: HOSPADM

## 2023-06-24 RX ORDER — PANTOPRAZOLE SODIUM 40 MG/1
40 TABLET, DELAYED RELEASE ORAL EVERY OTHER DAY
Status: DISCONTINUED | OUTPATIENT
Start: 2023-06-25 | End: 2023-06-27 | Stop reason: SDUPTHER

## 2023-06-24 RX ORDER — CLINDAMYCIN PHOSPHATE 300 MG/50ML
300 INJECTION INTRAVENOUS ONCE
Status: DISCONTINUED | OUTPATIENT
Start: 2023-06-24 | End: 2023-06-24

## 2023-06-24 RX ORDER — ACETAMINOPHEN 325 MG/1
650 TABLET ORAL ONCE
Status: COMPLETED | OUTPATIENT
Start: 2023-06-24 | End: 2023-06-24

## 2023-06-24 RX ORDER — SENNA AND DOCUSATE SODIUM 50; 8.6 MG/1; MG/1
3 TABLET, FILM COATED ORAL DAILY
Status: DISCONTINUED | OUTPATIENT
Start: 2023-06-25 | End: 2023-07-01 | Stop reason: HOSPADM

## 2023-06-24 RX ORDER — LANOLIN ALCOHOL/MO/W.PET/CERES
100 CREAM (GRAM) TOPICAL DAILY
Status: DISCONTINUED | OUTPATIENT
Start: 2023-06-25 | End: 2023-07-01 | Stop reason: HOSPADM

## 2023-06-24 RX ORDER — QUINIDINE GLUCONATE 324 MG
240 TABLET, EXTENDED RELEASE ORAL
Status: DISCONTINUED | OUTPATIENT
Start: 2023-06-25 | End: 2023-07-01 | Stop reason: HOSPADM

## 2023-06-24 RX ORDER — FOLIC ACID 1 MG/1
1 TABLET ORAL DAILY
Status: DISCONTINUED | OUTPATIENT
Start: 2023-06-25 | End: 2023-07-01 | Stop reason: HOSPADM

## 2023-06-24 RX ORDER — HYDROCODONE BITARTRATE AND ACETAMINOPHEN 7.5; 325 MG/1; MG/1
1 TABLET ORAL 2 TIMES DAILY PRN
Status: DISCONTINUED | OUTPATIENT
Start: 2023-06-24 | End: 2023-06-30

## 2023-06-24 RX ORDER — GUAIFENESIN 200 MG/10ML
200 LIQUID ORAL EVERY 4 HOURS PRN
Status: DISCONTINUED | OUTPATIENT
Start: 2023-06-24 | End: 2023-07-01 | Stop reason: HOSPADM

## 2023-06-24 RX ORDER — DOCUSATE SODIUM 100 MG/1
100 CAPSULE, LIQUID FILLED ORAL 2 TIMES DAILY
Status: DISCONTINUED | OUTPATIENT
Start: 2023-06-24 | End: 2023-07-01 | Stop reason: HOSPADM

## 2023-06-24 RX ORDER — ALBUTEROL SULFATE 90 UG/1
2 AEROSOL, METERED RESPIRATORY (INHALATION) EVERY 6 HOURS PRN
Status: DISCONTINUED | OUTPATIENT
Start: 2023-06-24 | End: 2023-07-01 | Stop reason: HOSPADM

## 2023-06-24 RX ORDER — FENTANYL CITRATE 50 UG/ML
50 INJECTION, SOLUTION INTRAMUSCULAR; INTRAVENOUS ONCE
Status: COMPLETED | OUTPATIENT
Start: 2023-06-24 | End: 2023-06-24

## 2023-06-24 RX ORDER — CLOPIDOGREL BISULFATE 75 MG/1
75 TABLET ORAL DAILY
Status: DISCONTINUED | OUTPATIENT
Start: 2023-06-25 | End: 2023-07-01 | Stop reason: HOSPADM

## 2023-06-24 RX ORDER — ATORVASTATIN CALCIUM 20 MG/1
20 TABLET, FILM COATED ORAL NIGHTLY
Status: DISCONTINUED | OUTPATIENT
Start: 2023-06-24 | End: 2023-07-01 | Stop reason: HOSPADM

## 2023-06-24 RX ORDER — HEPARIN SODIUM 10000 [USP'U]/100ML
5-30 INJECTION, SOLUTION INTRAVENOUS CONTINUOUS
Status: DISCONTINUED | OUTPATIENT
Start: 2023-06-24 | End: 2023-06-24

## 2023-06-24 RX ORDER — HEPARIN SODIUM 1000 [USP'U]/ML
60 INJECTION, SOLUTION INTRAVENOUS; SUBCUTANEOUS PRN
Status: DISCONTINUED | OUTPATIENT
Start: 2023-06-24 | End: 2023-06-24

## 2023-06-24 RX ORDER — LISINOPRIL 5 MG/1
5 TABLET ORAL DAILY
Status: DISCONTINUED | OUTPATIENT
Start: 2023-06-25 | End: 2023-07-01 | Stop reason: HOSPADM

## 2023-06-24 RX ORDER — LEVOTHYROXINE SODIUM 0.03 MG/1
25 TABLET ORAL DAILY
Status: DISCONTINUED | OUTPATIENT
Start: 2023-06-25 | End: 2023-07-01 | Stop reason: HOSPADM

## 2023-06-24 RX ORDER — TAMSULOSIN HYDROCHLORIDE 0.4 MG/1
0.4 CAPSULE ORAL DAILY
Status: DISCONTINUED | OUTPATIENT
Start: 2023-06-25 | End: 2023-07-01 | Stop reason: HOSPADM

## 2023-06-24 RX ORDER — 0.9 % SODIUM CHLORIDE 0.9 %
500 INTRAVENOUS SOLUTION INTRAVENOUS ONCE
Status: COMPLETED | OUTPATIENT
Start: 2023-06-24 | End: 2023-06-25

## 2023-06-24 RX ORDER — NICOTINE POLACRILEX 4 MG/1
20 GUM, CHEWING ORAL EVERY OTHER DAY
Status: DISCONTINUED | OUTPATIENT
Start: 2023-06-25 | End: 2023-06-24

## 2023-06-24 RX ORDER — HEPARIN SODIUM 1000 [USP'U]/ML
60 INJECTION, SOLUTION INTRAVENOUS; SUBCUTANEOUS ONCE
Status: COMPLETED | OUTPATIENT
Start: 2023-06-24 | End: 2023-06-24

## 2023-06-24 RX ORDER — HEPARIN SODIUM 1000 [USP'U]/ML
30 INJECTION, SOLUTION INTRAVENOUS; SUBCUTANEOUS PRN
Status: DISCONTINUED | OUTPATIENT
Start: 2023-06-24 | End: 2023-06-24

## 2023-06-24 RX ADMIN — HEPARIN SODIUM 12 UNITS/KG/HR: 10000 INJECTION, SOLUTION INTRAVENOUS at 19:23

## 2023-06-24 RX ADMIN — ACETAMINOPHEN 650 MG: 325 TABLET ORAL at 18:40

## 2023-06-24 RX ADMIN — FENTANYL CITRATE 50 MCG: 50 INJECTION, SOLUTION INTRAMUSCULAR; INTRAVENOUS at 18:40

## 2023-06-24 RX ADMIN — MORPHINE SULFATE 15 MG: 15 TABLET ORAL at 23:14

## 2023-06-24 RX ADMIN — PIPERACILLIN AND TAZOBACTAM 4500 MG: 4; .5 INJECTION, POWDER, LYOPHILIZED, FOR SOLUTION INTRAVENOUS at 18:59

## 2023-06-24 RX ADMIN — VANCOMYCIN HYDROCHLORIDE 1250 MG: 5 INJECTION, POWDER, LYOPHILIZED, FOR SOLUTION INTRAVENOUS at 19:33

## 2023-06-24 RX ADMIN — HEPARIN SODIUM 2910 UNITS: 1000 INJECTION INTRAVENOUS; SUBCUTANEOUS at 19:19

## 2023-06-24 RX ADMIN — SODIUM CHLORIDE 1455 ML: 9 INJECTION, SOLUTION INTRAVENOUS at 23:53

## 2023-06-24 RX ADMIN — IOVERSOL 100 ML: 509 INJECTION INTRA-ARTERIAL; INTRAVENOUS at 20:04

## 2023-06-24 RX ADMIN — SODIUM CHLORIDE 500 ML: 9 INJECTION, SOLUTION INTRAVENOUS at 18:42

## 2023-06-24 ASSESSMENT — PAIN SCALES - GENERAL
PAINLEVEL_OUTOF10: 8
PAINLEVEL_OUTOF10: 10
PAINLEVEL_OUTOF10: 7

## 2023-06-24 ASSESSMENT — PAIN DESCRIPTION - ORIENTATION
ORIENTATION: LEFT;RIGHT
ORIENTATION: LEFT

## 2023-06-24 ASSESSMENT — PAIN DESCRIPTION - DESCRIPTORS: DESCRIPTORS: SHARP

## 2023-06-24 ASSESSMENT — PAIN DESCRIPTION - LOCATION
LOCATION: FOOT
LOCATION: FOOT

## 2023-06-24 ASSESSMENT — PAIN - FUNCTIONAL ASSESSMENT: PAIN_FUNCTIONAL_ASSESSMENT: 0-10

## 2023-06-24 ASSESSMENT — PAIN DESCRIPTION - PAIN TYPE: TYPE: ACUTE PAIN;CHRONIC PAIN

## 2023-06-25 PROBLEM — G82.20 PARAPLEGIA (HCC): Status: ACTIVE | Noted: 2023-06-25

## 2023-06-25 PROBLEM — A41.9 SEPSIS (HCC): Status: ACTIVE | Noted: 2023-06-25

## 2023-06-25 PROBLEM — G89.29 CHRONIC PAIN: Status: ACTIVE | Noted: 2023-06-25

## 2023-06-25 PROBLEM — E87.20 LACTIC ACIDOSIS: Status: RESOLVED | Noted: 2021-09-01 | Resolved: 2023-06-25

## 2023-06-25 PROBLEM — I73.9 PAD (PERIPHERAL ARTERY DISEASE) (HCC): Status: ACTIVE | Noted: 2023-06-25

## 2023-06-25 PROBLEM — N31.9 NEUROGENIC BLADDER: Status: ACTIVE | Noted: 2023-06-25

## 2023-06-25 LAB
ANION GAP SERPL CALC-SCNC: 11 MEQ/L (ref 8–16)
APTT PPP: 30.1 SECONDS (ref 22–38)
BASOPHILS ABSOLUTE: 0 THOU/MM3 (ref 0–0.1)
BASOPHILS NFR BLD AUTO: 0.3 %
BUN SERPL-MCNC: 26 MG/DL (ref 7–22)
CALCIUM SERPL-MCNC: 8 MG/DL (ref 8.5–10.5)
CHLORIDE SERPL-SCNC: 108 MEQ/L (ref 98–111)
CO2 SERPL-SCNC: 19 MEQ/L (ref 23–33)
CREAT SERPL-MCNC: 1 MG/DL (ref 0.4–1.2)
DEPRECATED RDW RBC AUTO: 47.1 FL (ref 35–45)
DEPRECATED RDW RBC AUTO: 48.3 FL (ref 35–45)
EKG ATRIAL RATE: 69 BPM
EKG P AXIS: 78 DEGREES
EKG P-R INTERVAL: 122 MS
EKG Q-T INTERVAL: 390 MS
EKG QRS DURATION: 70 MS
EKG QTC CALCULATION (BAZETT): 417 MS
EKG R AXIS: 81 DEGREES
EKG T AXIS: 78 DEGREES
EKG VENTRICULAR RATE: 69 BPM
EOSINOPHIL NFR BLD AUTO: 1.6 %
EOSINOPHILS ABSOLUTE: 0.2 THOU/MM3 (ref 0–0.4)
ERYTHROCYTE [DISTWIDTH] IN BLOOD BY AUTOMATED COUNT: 13.5 % (ref 11.5–14.5)
ERYTHROCYTE [DISTWIDTH] IN BLOOD BY AUTOMATED COUNT: 13.7 % (ref 11.5–14.5)
GFR SERPL CREATININE-BSD FRML MDRD: > 60 ML/MIN/1.73M2
GLUCOSE SERPL-MCNC: 92 MG/DL (ref 70–108)
HCT VFR BLD AUTO: 31 % (ref 42–52)
HCT VFR BLD AUTO: 31.8 % (ref 42–52)
HGB BLD-MCNC: 9.8 GM/DL (ref 14–18)
HGB BLD-MCNC: 9.9 GM/DL (ref 14–18)
IMM GRANULOCYTES # BLD AUTO: 0.09 THOU/MM3 (ref 0–0.07)
IMM GRANULOCYTES NFR BLD AUTO: 0.9 %
LACTATE SERPL-SCNC: 0.6 MMOL/L (ref 0.5–2)
LACTATE SERPL-SCNC: 1 MMOL/L (ref 0.5–2)
LACTATE SERPL-SCNC: 1.3 MMOL/L (ref 0.5–2)
LYMPHOCYTES ABSOLUTE: 1.4 THOU/MM3 (ref 1–4.8)
LYMPHOCYTES NFR BLD AUTO: 14 %
MCH RBC QN AUTO: 29.9 PG (ref 26–33)
MCH RBC QN AUTO: 30.1 PG (ref 26–33)
MCHC RBC AUTO-ENTMCNC: 31.1 GM/DL (ref 32.2–35.5)
MCHC RBC AUTO-ENTMCNC: 31.6 GM/DL (ref 32.2–35.5)
MCV RBC AUTO: 95.1 FL (ref 80–94)
MCV RBC AUTO: 96.1 FL (ref 80–94)
MONOCYTES ABSOLUTE: 0.7 THOU/MM3 (ref 0.4–1.3)
MONOCYTES NFR BLD AUTO: 6.7 %
NEUTROPHILS NFR BLD AUTO: 76.5 %
NRBC BLD AUTO-RTO: 0 /100 WBC
PLATELET # BLD AUTO: 171 THOU/MM3 (ref 130–400)
PLATELET # BLD AUTO: 178 THOU/MM3 (ref 130–400)
PMV BLD AUTO: 9.8 FL (ref 9.4–12.4)
PMV BLD AUTO: 9.9 FL (ref 9.4–12.4)
POTASSIUM SERPL-SCNC: 3.7 MEQ/L (ref 3.5–5.2)
RBC # BLD AUTO: 3.26 MILL/MM3 (ref 4.7–6.1)
RBC # BLD AUTO: 3.31 MILL/MM3 (ref 4.7–6.1)
SEGMENTED NEUTROPHILS ABSOLUTE COUNT: 7.7 THOU/MM3 (ref 1.8–7.7)
SODIUM SERPL-SCNC: 138 MEQ/L (ref 135–145)
WBC # BLD AUTO: 10 THOU/MM3 (ref 4.8–10.8)
WBC # BLD AUTO: 10.9 THOU/MM3 (ref 4.8–10.8)

## 2023-06-25 PROCEDURE — 51701 INSERT BLADDER CATHETER: CPT

## 2023-06-25 PROCEDURE — 99232 SBSQ HOSP IP/OBS MODERATE 35: CPT | Performed by: INTERNAL MEDICINE

## 2023-06-25 PROCEDURE — 6370000000 HC RX 637 (ALT 250 FOR IP): Performed by: NURSE PRACTITIONER

## 2023-06-25 PROCEDURE — 1200000003 HC TELEMETRY R&B

## 2023-06-25 PROCEDURE — 85025 COMPLETE CBC W/AUTO DIFF WBC: CPT

## 2023-06-25 PROCEDURE — 83605 ASSAY OF LACTIC ACID: CPT

## 2023-06-25 PROCEDURE — 80048 BASIC METABOLIC PNL TOTAL CA: CPT

## 2023-06-25 PROCEDURE — 2580000003 HC RX 258: Performed by: NURSE PRACTITIONER

## 2023-06-25 PROCEDURE — 36415 COLL VENOUS BLD VENIPUNCTURE: CPT

## 2023-06-25 PROCEDURE — 85027 COMPLETE CBC AUTOMATED: CPT

## 2023-06-25 PROCEDURE — 93010 ELECTROCARDIOGRAM REPORT: CPT | Performed by: INTERNAL MEDICINE

## 2023-06-25 PROCEDURE — 85730 THROMBOPLASTIN TIME PARTIAL: CPT

## 2023-06-25 PROCEDURE — 99222 1ST HOSP IP/OBS MODERATE 55: CPT | Performed by: SURGERY

## 2023-06-25 PROCEDURE — 6360000002 HC RX W HCPCS: Performed by: NURSE PRACTITIONER

## 2023-06-25 PROCEDURE — 2580000003 HC RX 258: Performed by: INTERNAL MEDICINE

## 2023-06-25 RX ORDER — 0.9 % SODIUM CHLORIDE 0.9 %
1000 INTRAVENOUS SOLUTION INTRAVENOUS ONCE
Status: COMPLETED | OUTPATIENT
Start: 2023-06-25 | End: 2023-06-25

## 2023-06-25 RX ORDER — ONDANSETRON 4 MG/1
4 TABLET, ORALLY DISINTEGRATING ORAL EVERY 8 HOURS PRN
Status: DISCONTINUED | OUTPATIENT
Start: 2023-06-25 | End: 2023-07-01 | Stop reason: HOSPADM

## 2023-06-25 RX ORDER — ONDANSETRON 2 MG/ML
4 INJECTION INTRAMUSCULAR; INTRAVENOUS EVERY 6 HOURS PRN
Status: DISCONTINUED | OUTPATIENT
Start: 2023-06-25 | End: 2023-07-01 | Stop reason: HOSPADM

## 2023-06-25 RX ORDER — HEPARIN SODIUM 1000 [USP'U]/ML
40 INJECTION, SOLUTION INTRAVENOUS; SUBCUTANEOUS PRN
Status: DISCONTINUED | OUTPATIENT
Start: 2023-06-25 | End: 2023-06-25

## 2023-06-25 RX ORDER — HEPARIN SODIUM 10000 [USP'U]/100ML
5-30 INJECTION, SOLUTION INTRAVENOUS CONTINUOUS
Status: DISCONTINUED | OUTPATIENT
Start: 2023-06-25 | End: 2023-06-25

## 2023-06-25 RX ORDER — SODIUM CHLORIDE 0.9 % (FLUSH) 0.9 %
5-40 SYRINGE (ML) INJECTION PRN
Status: DISCONTINUED | OUTPATIENT
Start: 2023-06-25 | End: 2023-07-01 | Stop reason: HOSPADM

## 2023-06-25 RX ORDER — ENOXAPARIN SODIUM 100 MG/ML
30 INJECTION SUBCUTANEOUS DAILY
Status: DISCONTINUED | OUTPATIENT
Start: 2023-06-25 | End: 2023-06-25

## 2023-06-25 RX ORDER — HEPARIN SODIUM 1000 [USP'U]/ML
80 INJECTION, SOLUTION INTRAVENOUS; SUBCUTANEOUS ONCE
Status: DISCONTINUED | OUTPATIENT
Start: 2023-06-25 | End: 2023-06-25

## 2023-06-25 RX ORDER — POLYETHYLENE GLYCOL 3350 17 G/17G
17 POWDER, FOR SOLUTION ORAL DAILY PRN
Status: DISCONTINUED | OUTPATIENT
Start: 2023-06-25 | End: 2023-06-29

## 2023-06-25 RX ORDER — SODIUM CHLORIDE 9 MG/ML
INJECTION, SOLUTION INTRAVENOUS PRN
Status: DISCONTINUED | OUTPATIENT
Start: 2023-06-25 | End: 2023-07-01 | Stop reason: HOSPADM

## 2023-06-25 RX ORDER — HEPARIN SODIUM 10000 [USP'U]/100ML
5-30 INJECTION, SOLUTION INTRAVENOUS CONTINUOUS
Status: DISCONTINUED | OUTPATIENT
Start: 2023-06-25 | End: 2023-06-27

## 2023-06-25 RX ORDER — HEPARIN SODIUM 1000 [USP'U]/ML
30 INJECTION, SOLUTION INTRAVENOUS; SUBCUTANEOUS PRN
Status: DISCONTINUED | OUTPATIENT
Start: 2023-06-25 | End: 2023-06-27

## 2023-06-25 RX ORDER — SODIUM CHLORIDE 0.9 % (FLUSH) 0.9 %
5-40 SYRINGE (ML) INJECTION EVERY 12 HOURS SCHEDULED
Status: DISCONTINUED | OUTPATIENT
Start: 2023-06-25 | End: 2023-07-01 | Stop reason: HOSPADM

## 2023-06-25 RX ORDER — ACETAMINOPHEN 650 MG/1
650 SUPPOSITORY RECTAL EVERY 6 HOURS PRN
Status: DISCONTINUED | OUTPATIENT
Start: 2023-06-25 | End: 2023-07-01 | Stop reason: HOSPADM

## 2023-06-25 RX ORDER — HEPARIN SODIUM 1000 [USP'U]/ML
60 INJECTION, SOLUTION INTRAVENOUS; SUBCUTANEOUS PRN
Status: DISCONTINUED | OUTPATIENT
Start: 2023-06-25 | End: 2023-06-27

## 2023-06-25 RX ORDER — ACETAMINOPHEN 325 MG/1
650 TABLET ORAL EVERY 6 HOURS PRN
Status: DISCONTINUED | OUTPATIENT
Start: 2023-06-25 | End: 2023-07-01 | Stop reason: HOSPADM

## 2023-06-25 RX ORDER — HEPARIN SODIUM 1000 [USP'U]/ML
80 INJECTION, SOLUTION INTRAVENOUS; SUBCUTANEOUS PRN
Status: DISCONTINUED | OUTPATIENT
Start: 2023-06-25 | End: 2023-06-25

## 2023-06-25 RX ADMIN — Medication 240 MG: at 09:14

## 2023-06-25 RX ADMIN — SODIUM CHLORIDE 1000 ML: 9 INJECTION, SOLUTION INTRAVENOUS at 16:09

## 2023-06-25 RX ADMIN — MORPHINE SULFATE 15 MG: 15 TABLET ORAL at 19:47

## 2023-06-25 RX ADMIN — PIPERACILLIN AND TAZOBACTAM 3375 MG: 3; .375 INJECTION, POWDER, LYOPHILIZED, FOR SOLUTION INTRAVENOUS at 02:26

## 2023-06-25 RX ADMIN — ATORVASTATIN CALCIUM 20 MG: 80 TABLET, FILM COATED ORAL at 19:47

## 2023-06-25 RX ADMIN — ASPIRIN 81 MG 81 MG: 81 TABLET ORAL at 09:15

## 2023-06-25 RX ADMIN — VANCOMYCIN HYDROCHLORIDE 1000 MG: 1 INJECTION, POWDER, LYOPHILIZED, FOR SOLUTION INTRAVENOUS at 17:39

## 2023-06-25 RX ADMIN — PIPERACILLIN AND TAZOBACTAM 3375 MG: 3; .375 INJECTION, POWDER, LYOPHILIZED, FOR SOLUTION INTRAVENOUS at 18:53

## 2023-06-25 RX ADMIN — TAMSULOSIN HYDROCHLORIDE 0.4 MG: 0.4 CAPSULE ORAL at 09:14

## 2023-06-25 RX ADMIN — SODIUM CHLORIDE, PRESERVATIVE FREE 10 ML: 5 INJECTION INTRAVENOUS at 09:14

## 2023-06-25 RX ADMIN — LISINOPRIL 5 MG: 5 TABLET ORAL at 09:15

## 2023-06-25 RX ADMIN — Medication 0.5 TABLET: at 09:14

## 2023-06-25 RX ADMIN — FOLIC ACID 1 MG: 1 TABLET ORAL at 09:15

## 2023-06-25 RX ADMIN — ENOXAPARIN SODIUM 30 MG: 100 INJECTION SUBCUTANEOUS at 09:15

## 2023-06-25 RX ADMIN — CLOPIDOGREL BISULFATE 75 MG: 75 TABLET ORAL at 09:14

## 2023-06-25 RX ADMIN — PIPERACILLIN AND TAZOBACTAM 3375 MG: 3; .375 INJECTION, POWDER, LYOPHILIZED, FOR SOLUTION INTRAVENOUS at 10:45

## 2023-06-25 RX ADMIN — ATORVASTATIN CALCIUM 20 MG: 80 TABLET, FILM COATED ORAL at 02:27

## 2023-06-25 RX ADMIN — LEVOTHYROXINE SODIUM 25 MCG: 0.03 TABLET ORAL at 07:04

## 2023-06-25 RX ADMIN — OXYCODONE HYDROCHLORIDE AND ACETAMINOPHEN 250 MG: 500 TABLET ORAL at 09:15

## 2023-06-25 RX ADMIN — Medication 100 MG: at 09:14

## 2023-06-25 RX ADMIN — PANTOPRAZOLE SODIUM 40 MG: 40 TABLET, DELAYED RELEASE ORAL at 07:04

## 2023-06-25 RX ADMIN — DOCUSATE SODIUM 100 MG: 100 CAPSULE, LIQUID FILLED ORAL at 09:14

## 2023-06-25 RX ADMIN — MORPHINE SULFATE 15 MG: 15 TABLET ORAL at 09:14

## 2023-06-25 RX ADMIN — METOPROLOL TARTRATE 37.5 MG: 25 TABLET, FILM COATED ORAL at 09:15

## 2023-06-25 RX ADMIN — METOPROLOL TARTRATE 37.5 MG: 25 TABLET, FILM COATED ORAL at 02:28

## 2023-06-25 RX ADMIN — DOCUSATE SODIUM 50 MG AND SENNOSIDES 8.6 MG 3 TABLET: 8.6; 5 TABLET, FILM COATED ORAL at 09:14

## 2023-06-25 ASSESSMENT — PAIN SCALES - GENERAL
PAINLEVEL_OUTOF10: 8
PAINLEVEL_OUTOF10: 0
PAINLEVEL_OUTOF10: 0
PAINLEVEL_OUTOF10: 7

## 2023-06-26 LAB
ANION GAP SERPL CALC-SCNC: 10 MEQ/L (ref 8–16)
APTT PPP: 28.4 SECONDS (ref 22–38)
APTT PPP: 28.7 SECONDS (ref 22–38)
APTT PPP: 29.9 SECONDS (ref 22–38)
APTT PPP: 31.1 SECONDS (ref 22–38)
BUN SERPL-MCNC: 21 MG/DL (ref 7–22)
CALCIUM SERPL-MCNC: 8.6 MG/DL (ref 8.5–10.5)
CHLORIDE SERPL-SCNC: 107 MEQ/L (ref 98–111)
CO2 SERPL-SCNC: 20 MEQ/L (ref 23–33)
CREAT SERPL-MCNC: 0.9 MG/DL (ref 0.4–1.2)
GFR SERPL CREATININE-BSD FRML MDRD: > 60 ML/MIN/1.73M2
GLUCOSE SERPL-MCNC: 93 MG/DL (ref 70–108)
POTASSIUM SERPL-SCNC: 3.7 MEQ/L (ref 3.5–5.2)
SODIUM SERPL-SCNC: 137 MEQ/L (ref 135–145)
VANCOMYCIN SERPL-MCNC: 14.8 UG/ML (ref 0.1–39.9)

## 2023-06-26 PROCEDURE — 6360000002 HC RX W HCPCS: Performed by: NURSE PRACTITIONER

## 2023-06-26 PROCEDURE — 6370000000 HC RX 637 (ALT 250 FOR IP): Performed by: NURSE PRACTITIONER

## 2023-06-26 PROCEDURE — 99231 SBSQ HOSP IP/OBS SF/LOW 25: CPT | Performed by: SURGERY

## 2023-06-26 PROCEDURE — 1200000003 HC TELEMETRY R&B

## 2023-06-26 PROCEDURE — 87147 CULTURE TYPE IMMUNOLOGIC: CPT

## 2023-06-26 PROCEDURE — 87040 BLOOD CULTURE FOR BACTERIA: CPT

## 2023-06-26 PROCEDURE — 2580000003 HC RX 258: Performed by: NURSE PRACTITIONER

## 2023-06-26 PROCEDURE — 85730 THROMBOPLASTIN TIME PARTIAL: CPT

## 2023-06-26 PROCEDURE — 80048 BASIC METABOLIC PNL TOTAL CA: CPT

## 2023-06-26 PROCEDURE — 99232 SBSQ HOSP IP/OBS MODERATE 35: CPT | Performed by: INTERNAL MEDICINE

## 2023-06-26 PROCEDURE — 80202 ASSAY OF VANCOMYCIN: CPT

## 2023-06-26 PROCEDURE — 36415 COLL VENOUS BLD VENIPUNCTURE: CPT

## 2023-06-26 PROCEDURE — 87801 DETECT AGNT MULT DNA AMPLI: CPT

## 2023-06-26 RX ADMIN — Medication 240 MG: at 09:18

## 2023-06-26 RX ADMIN — OXYCODONE HYDROCHLORIDE AND ACETAMINOPHEN 250 MG: 500 TABLET ORAL at 09:18

## 2023-06-26 RX ADMIN — SODIUM CHLORIDE, PRESERVATIVE FREE 10 ML: 5 INJECTION INTRAVENOUS at 21:00

## 2023-06-26 RX ADMIN — VANCOMYCIN HYDROCHLORIDE 1000 MG: 1 INJECTION, POWDER, LYOPHILIZED, FOR SOLUTION INTRAVENOUS at 18:01

## 2023-06-26 RX ADMIN — MORPHINE SULFATE 15 MG: 15 TABLET ORAL at 09:18

## 2023-06-26 RX ADMIN — PIPERACILLIN AND TAZOBACTAM 3375 MG: 3; .375 INJECTION, POWDER, LYOPHILIZED, FOR SOLUTION INTRAVENOUS at 23:32

## 2023-06-26 RX ADMIN — LISINOPRIL 5 MG: 5 TABLET ORAL at 09:18

## 2023-06-26 RX ADMIN — PIPERACILLIN AND TAZOBACTAM 3375 MG: 3; .375 INJECTION, POWDER, LYOPHILIZED, FOR SOLUTION INTRAVENOUS at 02:04

## 2023-06-26 RX ADMIN — Medication 100 MG: at 09:18

## 2023-06-26 RX ADMIN — MORPHINE SULFATE 15 MG: 15 TABLET ORAL at 20:32

## 2023-06-26 RX ADMIN — Medication 0.5 TABLET: at 09:18

## 2023-06-26 RX ADMIN — HYDROCODONE BITARTRATE AND ACETAMINOPHEN 1 TABLET: 7.5; 325 TABLET ORAL at 02:19

## 2023-06-26 RX ADMIN — DOCUSATE SODIUM 100 MG: 100 CAPSULE, LIQUID FILLED ORAL at 20:48

## 2023-06-26 RX ADMIN — HYDROCODONE BITARTRATE AND ACETAMINOPHEN 1 TABLET: 7.5; 325 TABLET ORAL at 15:34

## 2023-06-26 RX ADMIN — ATORVASTATIN CALCIUM 20 MG: 80 TABLET, FILM COATED ORAL at 20:32

## 2023-06-26 RX ADMIN — PIPERACILLIN AND TAZOBACTAM 3375 MG: 3; .375 INJECTION, POWDER, LYOPHILIZED, FOR SOLUTION INTRAVENOUS at 16:46

## 2023-06-26 RX ADMIN — TAMSULOSIN HYDROCHLORIDE 0.4 MG: 0.4 CAPSULE ORAL at 09:18

## 2023-06-26 RX ADMIN — FOLIC ACID 1 MG: 1 TABLET ORAL at 09:18

## 2023-06-26 ASSESSMENT — PAIN DESCRIPTION - ONSET: ONSET: ON-GOING

## 2023-06-26 ASSESSMENT — PAIN SCALES - GENERAL
PAINLEVEL_OUTOF10: 8
PAINLEVEL_OUTOF10: 7
PAINLEVEL_OUTOF10: 4
PAINLEVEL_OUTOF10: 8

## 2023-06-26 ASSESSMENT — ENCOUNTER SYMPTOMS
RESPIRATORY NEGATIVE: 1
COLOR CHANGE: 1
GASTROINTESTINAL NEGATIVE: 1
EYES NEGATIVE: 1

## 2023-06-26 ASSESSMENT — PAIN DESCRIPTION - ORIENTATION
ORIENTATION: LEFT
ORIENTATION: LEFT

## 2023-06-26 ASSESSMENT — PAIN DESCRIPTION - FREQUENCY: FREQUENCY: CONTINUOUS

## 2023-06-26 ASSESSMENT — PAIN DESCRIPTION - LOCATION
LOCATION: FOOT
LOCATION: BACK;LEG
LOCATION: FOOT

## 2023-06-26 ASSESSMENT — PAIN DESCRIPTION - DESCRIPTORS
DESCRIPTORS: ACHING

## 2023-06-26 ASSESSMENT — PAIN DESCRIPTION - PAIN TYPE: TYPE: ACUTE PAIN

## 2023-06-26 ASSESSMENT — PAIN - FUNCTIONAL ASSESSMENT
PAIN_FUNCTIONAL_ASSESSMENT: PREVENTS OR INTERFERES SOME ACTIVE ACTIVITIES AND ADLS
PAIN_FUNCTIONAL_ASSESSMENT: PREVENTS OR INTERFERES SOME ACTIVE ACTIVITIES AND ADLS

## 2023-06-27 ENCOUNTER — ANESTHESIA EVENT (OUTPATIENT)
Dept: OPERATING ROOM | Age: 73
End: 2023-06-27
Payer: OTHER GOVERNMENT

## 2023-06-27 ENCOUNTER — ANESTHESIA (OUTPATIENT)
Dept: OPERATING ROOM | Age: 73
End: 2023-06-27
Payer: OTHER GOVERNMENT

## 2023-06-27 LAB
ACB COMPLEX DNA BLD POS QL NAA+NON-PROBE: NOT DETECTED
APTT PPP: 28.8 SECONDS (ref 22–38)
APTT PPP: 30.5 SECONDS (ref 22–38)
B FRAGILIS DNA BLD POS QL NAA+NON-PROBE: NOT DETECTED
BLACTX-M ISLT/SPM QL: ABNORMAL
BLAIMP ISLT/SPM QL: ABNORMAL
BLAKPC ISLT/SPM QL: ABNORMAL
BLAOXA-48-LIKE ISLT/SPM QL: ABNORMAL
BLAVIM ISLT/SPM QL: ABNORMAL
BOTTLE TYPE: ABNORMAL
C ALBICANS DNA BLD POS QL NAA+NON-PROBE: NOT DETECTED
C AURIS DNA BLD POS QL NAA+NON-PROBE: NOT DETECTED
C GATTII+NEOFOR DNA BLD POS QL NAA+N-PRB: NOT DETECTED
C GLABRATA DNA BLD POS QL NAA+NON-PROBE: NOT DETECTED
C KRUSEI DNA BLD POS QL NAA+NON-PROBE: NOT DETECTED
C PARAP DNA BLD POS QL NAA+NON-PROBE: NOT DETECTED
C TROPICLS DNA BLD POS QL NAA+NON-PROBE: NOT DETECTED
COAG NEG STAPH DNA BLD QL NAA+PROBE: DETECTED
COLISTIN RES MCR-1 ISLT/SPM QL: ABNORMAL
DEPRECATED RDW RBC AUTO: 45.6 FL (ref 35–45)
E CLOAC COMP DNA BLD POS NAA+NON-PROBE: NOT DETECTED
E COLI DNA BLD POS QL NAA+NON-PROBE: NOT DETECTED
E FAECALIS DNA BLD POS QL NAA+NON-PROBE: NOT DETECTED
E FAECIUM DNA BLD POS QL NAA+NON-PROBE: NOT DETECTED
ENTEROBACTERALES DNA BLD POS NAA+N-PRB: NOT DETECTED
ERYTHROCYTE [DISTWIDTH] IN BLOOD BY AUTOMATED COUNT: 13.4 % (ref 11.5–14.5)
GP B STREP DNA SPEC QL NAA+PROBE: NOT DETECTED
GP B STREP DNA SPEC QL NAA+PROBE: NOT DETECTED
HAEM INFLU DNA BLD POS QL NAA+NON-PROBE: NOT DETECTED
HCT VFR BLD AUTO: 30.3 % (ref 42–52)
HGB BLD-MCNC: 9.8 GM/DL (ref 14–18)
K OXYTOCA DNA BLD POS QL NAA+NON-PROBE: NOT DETECTED
K OXYTOCA DNA BLD POS QL NAA+NON-PROBE: NOT DETECTED
KLEBSIELLA SP DNA BLD POS QL NAA+NON-PRB: NOT DETECTED
L MONOCYTOG DNA BLD POS QL NAA+NON-PROBE: NOT DETECTED
MCH RBC QN AUTO: 30.1 PG (ref 26–33)
MCHC RBC AUTO-ENTMCNC: 32.3 GM/DL (ref 32.2–35.5)
MCV RBC AUTO: 92.9 FL (ref 80–94)
MECA ISLT/SPM QL: ABNORMAL
MECA+MECC+MREJ ISLT/SPM QL: ABNORMAL
N MEN DNA BLD POS QL NAA+NON-PROBE: NOT DETECTED
NDM: ABNORMAL
P AERUGINOSA DNA BLD POS NAA+NON-PROBE: NOT DETECTED
PLATELET # BLD AUTO: 173 THOU/MM3 (ref 130–400)
PMV BLD AUTO: 9.4 FL (ref 9.4–12.4)
PROTEUS SPP: NOT DETECTED
RBC # BLD AUTO: 3.26 MILL/MM3 (ref 4.7–6.1)
S AUREUS DNA BLD POS QL NAA+NON-PROBE: NOT DETECTED
S EPIDERMIDIS DNA BLD POS QL NAA+NON-PRB: NOT DETECTED
S LUGDUNENSIS DNA BLD POS QL NAA+NON-PRB: NOT DETECTED
S MALTOPHILIA DNA BLD POS QL NAA+NON-PRB: NOT DETECTED
S MARCESCENS DNA BLD POS NAA+NON-PROBE: NOT DETECTED
S PYO DNA THROAT QL NAA+PROBE: NOT DETECTED
SALMONELLA DNA BLD POS QL NAA+NON-PROBE: NOT DETECTED
SOURCE OF BLOOD CULTURE: ABNORMAL
STREPTOCOCCUS DNA BLD QL NAA+PROBE: NOT DETECTED
VANA+VANB ISLT/SPM QL: ABNORMAL
WBC # BLD AUTO: 10 THOU/MM3 (ref 4.8–10.8)

## 2023-06-27 PROCEDURE — 2580000003 HC RX 258: Performed by: NURSE PRACTITIONER

## 2023-06-27 PROCEDURE — 6360000002 HC RX W HCPCS: Performed by: NURSE ANESTHETIST, CERTIFIED REGISTERED

## 2023-06-27 PROCEDURE — 2580000003 HC RX 258: Performed by: NURSE ANESTHETIST, CERTIFIED REGISTERED

## 2023-06-27 PROCEDURE — 2500000003 HC RX 250 WO HCPCS: Performed by: NURSE ANESTHETIST, CERTIFIED REGISTERED

## 2023-06-27 PROCEDURE — 6370000000 HC RX 637 (ALT 250 FOR IP): Performed by: NURSE PRACTITIONER

## 2023-06-27 PROCEDURE — 0Y6D0Z3 DETACHMENT AT LEFT UPPER LEG, LOW, OPEN APPROACH: ICD-10-PCS | Performed by: SURGERY

## 2023-06-27 PROCEDURE — 3700000001 HC ADD 15 MINUTES (ANESTHESIA): Performed by: SURGERY

## 2023-06-27 PROCEDURE — 99232 SBSQ HOSP IP/OBS MODERATE 35: CPT | Performed by: INTERNAL MEDICINE

## 2023-06-27 PROCEDURE — 36415 COLL VENOUS BLD VENIPUNCTURE: CPT

## 2023-06-27 PROCEDURE — 6360000002 HC RX W HCPCS: Performed by: NURSE PRACTITIONER

## 2023-06-27 PROCEDURE — 7100000000 HC PACU RECOVERY - FIRST 15 MIN: Performed by: SURGERY

## 2023-06-27 PROCEDURE — 1200000003 HC TELEMETRY R&B

## 2023-06-27 PROCEDURE — 2500000003 HC RX 250 WO HCPCS: Performed by: SURGERY

## 2023-06-27 PROCEDURE — 85730 THROMBOPLASTIN TIME PARTIAL: CPT

## 2023-06-27 PROCEDURE — 3600000013 HC SURGERY LEVEL 3 ADDTL 15MIN: Performed by: SURGERY

## 2023-06-27 PROCEDURE — 2709999900 HC NON-CHARGEABLE SUPPLY: Performed by: SURGERY

## 2023-06-27 PROCEDURE — 2720000010 HC SURG SUPPLY STERILE: Performed by: SURGERY

## 2023-06-27 PROCEDURE — 3700000000 HC ANESTHESIA ATTENDED CARE: Performed by: SURGERY

## 2023-06-27 PROCEDURE — 85027 COMPLETE CBC AUTOMATED: CPT

## 2023-06-27 PROCEDURE — 27590 AMPUTATE LEG AT THIGH: CPT | Performed by: SURGERY

## 2023-06-27 PROCEDURE — 3600000003 HC SURGERY LEVEL 3 BASE: Performed by: SURGERY

## 2023-06-27 PROCEDURE — 7100000001 HC PACU RECOVERY - ADDTL 15 MIN: Performed by: SURGERY

## 2023-06-27 RX ORDER — ROCURONIUM BROMIDE 10 MG/ML
INJECTION, SOLUTION INTRAVENOUS PRN
Status: DISCONTINUED | OUTPATIENT
Start: 2023-06-27 | End: 2023-06-27 | Stop reason: SDUPTHER

## 2023-06-27 RX ORDER — SODIUM CHLORIDE 0.9 % (FLUSH) 0.9 %
5-40 SYRINGE (ML) INJECTION EVERY 12 HOURS SCHEDULED
Status: DISCONTINUED | OUTPATIENT
Start: 2023-06-27 | End: 2023-06-27 | Stop reason: HOSPADM

## 2023-06-27 RX ORDER — SODIUM CHLORIDE 9 MG/ML
INJECTION, SOLUTION INTRAVENOUS PRN
Status: DISCONTINUED | OUTPATIENT
Start: 2023-06-27 | End: 2023-06-29 | Stop reason: ALTCHOICE

## 2023-06-27 RX ORDER — SODIUM CHLORIDE 9 MG/ML
INJECTION, SOLUTION INTRAVENOUS CONTINUOUS
Status: DISCONTINUED | OUTPATIENT
Start: 2023-06-27 | End: 2023-07-01 | Stop reason: HOSPADM

## 2023-06-27 RX ORDER — PROPOFOL 10 MG/ML
INJECTION, EMULSION INTRAVENOUS PRN
Status: DISCONTINUED | OUTPATIENT
Start: 2023-06-27 | End: 2023-06-27 | Stop reason: SDUPTHER

## 2023-06-27 RX ORDER — MIDAZOLAM HYDROCHLORIDE 1 MG/ML
INJECTION INTRAMUSCULAR; INTRAVENOUS PRN
Status: DISCONTINUED | OUTPATIENT
Start: 2023-06-27 | End: 2023-06-27 | Stop reason: SDUPTHER

## 2023-06-27 RX ORDER — SODIUM CHLORIDE 9 MG/ML
INJECTION, SOLUTION INTRAVENOUS PRN
Status: DISCONTINUED | OUTPATIENT
Start: 2023-06-27 | End: 2023-06-27 | Stop reason: HOSPADM

## 2023-06-27 RX ORDER — SODIUM CHLORIDE 0.9 % (FLUSH) 0.9 %
5-40 SYRINGE (ML) INJECTION PRN
Status: DISCONTINUED | OUTPATIENT
Start: 2023-06-27 | End: 2023-06-29 | Stop reason: ALTCHOICE

## 2023-06-27 RX ORDER — ONDANSETRON 2 MG/ML
INJECTION INTRAMUSCULAR; INTRAVENOUS PRN
Status: DISCONTINUED | OUTPATIENT
Start: 2023-06-27 | End: 2023-06-27 | Stop reason: SDUPTHER

## 2023-06-27 RX ORDER — LIDOCAINE HYDROCHLORIDE 20 MG/ML
INJECTION, SOLUTION INTRAVENOUS PRN
Status: DISCONTINUED | OUTPATIENT
Start: 2023-06-27 | End: 2023-06-27 | Stop reason: SDUPTHER

## 2023-06-27 RX ORDER — VASOPRESSIN 20 U/ML
INJECTION PARENTERAL PRN
Status: DISCONTINUED | OUTPATIENT
Start: 2023-06-27 | End: 2023-06-27 | Stop reason: SDUPTHER

## 2023-06-27 RX ORDER — ENOXAPARIN SODIUM 100 MG/ML
40 INJECTION SUBCUTANEOUS DAILY
Status: DISCONTINUED | OUTPATIENT
Start: 2023-06-28 | End: 2023-07-01 | Stop reason: HOSPADM

## 2023-06-27 RX ORDER — FENTANYL CITRATE 50 UG/ML
INJECTION, SOLUTION INTRAMUSCULAR; INTRAVENOUS PRN
Status: DISCONTINUED | OUTPATIENT
Start: 2023-06-27 | End: 2023-06-27 | Stop reason: SDUPTHER

## 2023-06-27 RX ORDER — SODIUM CHLORIDE 0.9 % (FLUSH) 0.9 %
5-40 SYRINGE (ML) INJECTION PRN
Status: DISCONTINUED | OUTPATIENT
Start: 2023-06-27 | End: 2023-06-27 | Stop reason: HOSPADM

## 2023-06-27 RX ORDER — PANTOPRAZOLE SODIUM 40 MG/1
40 TABLET, DELAYED RELEASE ORAL
Status: DISCONTINUED | OUTPATIENT
Start: 2023-06-28 | End: 2023-07-01 | Stop reason: HOSPADM

## 2023-06-27 RX ORDER — ONDANSETRON 4 MG/1
4 TABLET, ORALLY DISINTEGRATING ORAL EVERY 8 HOURS PRN
Status: DISCONTINUED | OUTPATIENT
Start: 2023-06-27 | End: 2023-06-29 | Stop reason: SDUPTHER

## 2023-06-27 RX ORDER — SODIUM CHLORIDE 0.9 % (FLUSH) 0.9 %
5-40 SYRINGE (ML) INJECTION EVERY 12 HOURS SCHEDULED
Status: DISCONTINUED | OUTPATIENT
Start: 2023-06-27 | End: 2023-06-29 | Stop reason: ALTCHOICE

## 2023-06-27 RX ORDER — SODIUM CHLORIDE 9 MG/ML
INJECTION, SOLUTION INTRAVENOUS CONTINUOUS PRN
Status: DISCONTINUED | OUTPATIENT
Start: 2023-06-27 | End: 2023-06-27 | Stop reason: SDUPTHER

## 2023-06-27 RX ORDER — SODIUM CHLORIDE 9 MG/ML
INJECTION, SOLUTION INTRAVENOUS CONTINUOUS
Status: DISCONTINUED | OUTPATIENT
Start: 2023-06-27 | End: 2023-06-27 | Stop reason: HOSPADM

## 2023-06-27 RX ORDER — PIPERACILLIN SODIUM, TAZOBACTAM SODIUM 3; .375 G/15ML; G/15ML
INJECTION, POWDER, LYOPHILIZED, FOR SOLUTION INTRAVENOUS PRN
Status: DISCONTINUED | OUTPATIENT
Start: 2023-06-27 | End: 2023-06-27 | Stop reason: SDUPTHER

## 2023-06-27 RX ORDER — ONDANSETRON 2 MG/ML
4 INJECTION INTRAMUSCULAR; INTRAVENOUS EVERY 6 HOURS PRN
Status: DISCONTINUED | OUTPATIENT
Start: 2023-06-27 | End: 2023-06-29 | Stop reason: SDUPTHER

## 2023-06-27 RX ORDER — SUCCINYLCHOLINE/SOD CL,ISO/PF 200MG/10ML
SYRINGE (ML) INTRAVENOUS PRN
Status: DISCONTINUED | OUTPATIENT
Start: 2023-06-27 | End: 2023-06-27 | Stop reason: SDUPTHER

## 2023-06-27 RX ADMIN — SUGAMMADEX 200 MG: 100 INJECTION, SOLUTION INTRAVENOUS at 16:31

## 2023-06-27 RX ADMIN — METOPROLOL TARTRATE 37.5 MG: 25 TABLET, FILM COATED ORAL at 11:31

## 2023-06-27 RX ADMIN — ROCURONIUM BROMIDE 50 MG: 10 INJECTION INTRAVENOUS at 15:10

## 2023-06-27 RX ADMIN — PROPOFOL 100 MG: 10 INJECTION, EMULSION INTRAVENOUS at 14:59

## 2023-06-27 RX ADMIN — VANCOMYCIN HYDROCHLORIDE 1000 MG: 1 INJECTION, POWDER, LYOPHILIZED, FOR SOLUTION INTRAVENOUS at 10:01

## 2023-06-27 RX ADMIN — ONDANSETRON 4 MG: 2 INJECTION INTRAMUSCULAR; INTRAVENOUS at 16:27

## 2023-06-27 RX ADMIN — SODIUM CHLORIDE: 9 INJECTION, SOLUTION INTRAVENOUS at 16:10

## 2023-06-27 RX ADMIN — LIDOCAINE HYDROCHLORIDE 60 MG: 20 INJECTION, SOLUTION INTRAVENOUS at 14:59

## 2023-06-27 RX ADMIN — PIPERACILLIN AND TAZOBACTAM 3375 MG: 3; .375 INJECTION, POWDER, LYOPHILIZED, FOR SOLUTION INTRAVENOUS at 23:31

## 2023-06-27 RX ADMIN — SODIUM CHLORIDE, PRESERVATIVE FREE 10 ML: 5 INJECTION INTRAVENOUS at 08:00

## 2023-06-27 RX ADMIN — FENTANYL CITRATE 100 MCG: 50 INJECTION, SOLUTION INTRAMUSCULAR; INTRAVENOUS at 15:25

## 2023-06-27 RX ADMIN — ATORVASTATIN CALCIUM 20 MG: 80 TABLET, FILM COATED ORAL at 20:54

## 2023-06-27 RX ADMIN — LISINOPRIL 5 MG: 5 TABLET ORAL at 09:18

## 2023-06-27 RX ADMIN — HYDROMORPHONE HYDROCHLORIDE 1 MG: 1 INJECTION, SOLUTION INTRAMUSCULAR; INTRAVENOUS; SUBCUTANEOUS at 23:37

## 2023-06-27 RX ADMIN — VASOPRESSIN 4 UNITS: 20 INJECTION INTRAVENOUS at 15:20

## 2023-06-27 RX ADMIN — HYDROCODONE BITARTRATE AND ACETAMINOPHEN 1 TABLET: 7.5; 325 TABLET ORAL at 00:54

## 2023-06-27 RX ADMIN — DOCUSATE SODIUM 100 MG: 100 CAPSULE, LIQUID FILLED ORAL at 20:54

## 2023-06-27 RX ADMIN — SODIUM CHLORIDE: 9 INJECTION, SOLUTION INTRAVENOUS at 14:46

## 2023-06-27 RX ADMIN — PIPERACILLIN AND TAZOBACTAM 3375 MG: 3; .375 INJECTION, POWDER, LYOPHILIZED, FOR SOLUTION INTRAVENOUS at 11:07

## 2023-06-27 RX ADMIN — METOPROLOL TARTRATE 37.5 MG: 25 TABLET, FILM COATED ORAL at 23:38

## 2023-06-27 RX ADMIN — MIDAZOLAM 2 MG: 1 INJECTION INTRAMUSCULAR; INTRAVENOUS at 14:47

## 2023-06-27 RX ADMIN — PHENYLEPHRINE HYDROCHLORIDE 200 MCG: 10 INJECTION INTRAVENOUS at 15:04

## 2023-06-27 RX ADMIN — MORPHINE SULFATE 15 MG: 15 TABLET ORAL at 20:54

## 2023-06-27 RX ADMIN — PIPERACILLIN SODIUM,TAZOBACTAM SODIUM 3.38 G: 3; .375 INJECTION, POWDER, FOR SOLUTION INTRAVENOUS at 15:05

## 2023-06-27 RX ADMIN — MORPHINE SULFATE 15 MG: 15 TABLET ORAL at 09:14

## 2023-06-27 RX ADMIN — Medication 120 MG: at 14:59

## 2023-06-27 RX ADMIN — TAMSULOSIN HYDROCHLORIDE 0.4 MG: 0.4 CAPSULE ORAL at 09:19

## 2023-06-27 RX ADMIN — PHENYLEPHRINE HYDROCHLORIDE 100 MCG: 10 INJECTION INTRAVENOUS at 15:00

## 2023-06-27 ASSESSMENT — PAIN SCALES - GENERAL
PAINLEVEL_OUTOF10: 0
PAINLEVEL_OUTOF10: 7
PAINLEVEL_OUTOF10: 3
PAINLEVEL_OUTOF10: 0
PAINLEVEL_OUTOF10: 5
PAINLEVEL_OUTOF10: 0
PAINLEVEL_OUTOF10: 10
PAINLEVEL_OUTOF10: 10

## 2023-06-27 ASSESSMENT — PAIN DESCRIPTION - DESCRIPTORS
DESCRIPTORS: ACHING;DULL
DESCRIPTORS: ACHING
DESCRIPTORS: TIGHTNESS
DESCRIPTORS: ACHING

## 2023-06-27 ASSESSMENT — PAIN - FUNCTIONAL ASSESSMENT: PAIN_FUNCTIONAL_ASSESSMENT: PREVENTS OR INTERFERES SOME ACTIVE ACTIVITIES AND ADLS

## 2023-06-27 ASSESSMENT — PAIN DESCRIPTION - LOCATION
LOCATION: BACK;LEG;NECK
LOCATION: LEG
LOCATION: OTHER (COMMENT)
LOCATION: LEG
LOCATION: OTHER (COMMENT)

## 2023-06-27 ASSESSMENT — PAIN SCALES - WONG BAKER
WONGBAKER_NUMERICALRESPONSE: 0

## 2023-06-27 ASSESSMENT — PAIN DESCRIPTION - FREQUENCY
FREQUENCY: CONTINUOUS
FREQUENCY: CONTINUOUS

## 2023-06-27 ASSESSMENT — PAIN DESCRIPTION - ORIENTATION
ORIENTATION: LEFT

## 2023-06-27 ASSESSMENT — PAIN DESCRIPTION - PAIN TYPE
TYPE: ACUTE PAIN;SURGICAL PAIN
TYPE: ACUTE PAIN;SURGICAL PAIN

## 2023-06-27 ASSESSMENT — PAIN DESCRIPTION - ONSET: ONSET: ON-GOING

## 2023-06-28 PROBLEM — E43 SEVERE MALNUTRITION (HCC): Status: ACTIVE | Noted: 2023-06-28

## 2023-06-28 LAB
ANION GAP SERPL CALC-SCNC: 8 MEQ/L (ref 8–16)
BACTERIA BLD AEROBE CULT: ABNORMAL
BASOPHILS ABSOLUTE: 0 THOU/MM3 (ref 0–0.1)
BASOPHILS NFR BLD AUTO: 0.4 %
BUN SERPL-MCNC: 12 MG/DL (ref 7–22)
CALCIUM SERPL-MCNC: 8.3 MG/DL (ref 8.5–10.5)
CHLORIDE SERPL-SCNC: 109 MEQ/L (ref 98–111)
CO2 SERPL-SCNC: 23 MEQ/L (ref 23–33)
CREAT SERPL-MCNC: 1 MG/DL (ref 0.4–1.2)
DEPRECATED RDW RBC AUTO: 46.8 FL (ref 35–45)
EOSINOPHIL NFR BLD AUTO: 0.8 %
EOSINOPHILS ABSOLUTE: 0.1 THOU/MM3 (ref 0–0.4)
ERYTHROCYTE [DISTWIDTH] IN BLOOD BY AUTOMATED COUNT: 13.5 % (ref 11.5–14.5)
GFR SERPL CREATININE-BSD FRML MDRD: > 60 ML/MIN/1.73M2
GLUCOSE SERPL-MCNC: 107 MG/DL (ref 70–108)
HCT VFR BLD AUTO: 29.7 % (ref 42–52)
HGB BLD-MCNC: 9.3 GM/DL (ref 14–18)
IMM GRANULOCYTES # BLD AUTO: 0.13 THOU/MM3 (ref 0–0.07)
IMM GRANULOCYTES NFR BLD AUTO: 1.2 %
LYMPHOCYTES ABSOLUTE: 0.8 THOU/MM3 (ref 1–4.8)
LYMPHOCYTES NFR BLD AUTO: 6.9 %
MAGNESIUM SERPL-MCNC: 1.6 MG/DL (ref 1.6–2.4)
MCH RBC QN AUTO: 29.8 PG (ref 26–33)
MCHC RBC AUTO-ENTMCNC: 31.3 GM/DL (ref 32.2–35.5)
MCV RBC AUTO: 95.2 FL (ref 80–94)
MONOCYTES ABSOLUTE: 0.6 THOU/MM3 (ref 0.4–1.3)
MONOCYTES NFR BLD AUTO: 5.1 %
NEUTROPHILS NFR BLD AUTO: 85.6 %
NRBC BLD AUTO-RTO: 0 /100 WBC
ORGANISM: ABNORMAL
ORGANISM: ABNORMAL
PLATELET # BLD AUTO: 178 THOU/MM3 (ref 130–400)
PMV BLD AUTO: 9.3 FL (ref 9.4–12.4)
POTASSIUM SERPL-SCNC: 4.1 MEQ/L (ref 3.5–5.2)
RBC # BLD AUTO: 3.12 MILL/MM3 (ref 4.7–6.1)
SEGMENTED NEUTROPHILS ABSOLUTE COUNT: 9.5 THOU/MM3 (ref 1.8–7.7)
SODIUM SERPL-SCNC: 140 MEQ/L (ref 135–145)
WBC # BLD AUTO: 11.1 THOU/MM3 (ref 4.8–10.8)

## 2023-06-28 PROCEDURE — 36415 COLL VENOUS BLD VENIPUNCTURE: CPT

## 2023-06-28 PROCEDURE — 80048 BASIC METABOLIC PNL TOTAL CA: CPT

## 2023-06-28 PROCEDURE — 99232 SBSQ HOSP IP/OBS MODERATE 35: CPT | Performed by: INTERNAL MEDICINE

## 2023-06-28 PROCEDURE — 6370000000 HC RX 637 (ALT 250 FOR IP): Performed by: SURGERY

## 2023-06-28 PROCEDURE — 83735 ASSAY OF MAGNESIUM: CPT

## 2023-06-28 PROCEDURE — 6370000000 HC RX 637 (ALT 250 FOR IP): Performed by: NURSE PRACTITIONER

## 2023-06-28 PROCEDURE — 6360000002 HC RX W HCPCS: Performed by: NURSE PRACTITIONER

## 2023-06-28 PROCEDURE — 2580000003 HC RX 258: Performed by: SURGERY

## 2023-06-28 PROCEDURE — 99024 POSTOP FOLLOW-UP VISIT: CPT | Performed by: SURGERY

## 2023-06-28 PROCEDURE — 2500000003 HC RX 250 WO HCPCS: Performed by: SURGERY

## 2023-06-28 PROCEDURE — 6360000002 HC RX W HCPCS: Performed by: SURGERY

## 2023-06-28 PROCEDURE — 1200000000 HC SEMI PRIVATE

## 2023-06-28 PROCEDURE — 2580000003 HC RX 258

## 2023-06-28 PROCEDURE — 2580000003 HC RX 258: Performed by: NURSE PRACTITIONER

## 2023-06-28 PROCEDURE — 85025 COMPLETE CBC W/AUTO DIFF WBC: CPT

## 2023-06-28 RX ORDER — 0.9 % SODIUM CHLORIDE 0.9 %
1000 INTRAVENOUS SOLUTION INTRAVENOUS ONCE
Status: DISCONTINUED | OUTPATIENT
Start: 2023-06-28 | End: 2023-06-28

## 2023-06-28 RX ORDER — 0.9 % SODIUM CHLORIDE 0.9 %
500 INTRAVENOUS SOLUTION INTRAVENOUS ONCE
Status: COMPLETED | OUTPATIENT
Start: 2023-06-28 | End: 2023-06-28

## 2023-06-28 RX ADMIN — SODIUM CHLORIDE 500 ML: 9 INJECTION, SOLUTION INTRAVENOUS at 05:14

## 2023-06-28 RX ADMIN — HYDROCODONE BITARTRATE AND ACETAMINOPHEN 1 TABLET: 7.5; 325 TABLET ORAL at 15:25

## 2023-06-28 RX ADMIN — HYDROMORPHONE HYDROCHLORIDE 0.5 MG: 1 INJECTION, SOLUTION INTRAMUSCULAR; INTRAVENOUS; SUBCUTANEOUS at 12:08

## 2023-06-28 RX ADMIN — FOLIC ACID 1 MG: 1 TABLET ORAL at 09:28

## 2023-06-28 RX ADMIN — Medication 0.5 TABLET: at 09:28

## 2023-06-28 RX ADMIN — SODIUM CHLORIDE, PRESERVATIVE FREE 10 ML: 5 INJECTION INTRAVENOUS at 09:29

## 2023-06-28 RX ADMIN — DOCUSATE SODIUM 100 MG: 100 CAPSULE, LIQUID FILLED ORAL at 09:27

## 2023-06-28 RX ADMIN — OXYCODONE HYDROCHLORIDE AND ACETAMINOPHEN 250 MG: 500 TABLET ORAL at 09:28

## 2023-06-28 RX ADMIN — DOCUSATE SODIUM 50 MG AND SENNOSIDES 8.6 MG 3 TABLET: 8.6; 5 TABLET, FILM COATED ORAL at 09:27

## 2023-06-28 RX ADMIN — VANCOMYCIN HYDROCHLORIDE 1000 MG: 1 INJECTION, POWDER, LYOPHILIZED, FOR SOLUTION INTRAVENOUS at 13:21

## 2023-06-28 RX ADMIN — ASPIRIN 81 MG 81 MG: 81 TABLET ORAL at 09:27

## 2023-06-28 RX ADMIN — MORPHINE SULFATE 15 MG: 15 TABLET ORAL at 21:37

## 2023-06-28 RX ADMIN — SODIUM CHLORIDE: 9 INJECTION, SOLUTION INTRAVENOUS at 01:57

## 2023-06-28 RX ADMIN — ENOXAPARIN SODIUM 40 MG: 100 INJECTION SUBCUTANEOUS at 12:08

## 2023-06-28 RX ADMIN — Medication 100 MG: at 09:27

## 2023-06-28 RX ADMIN — ATORVASTATIN CALCIUM 20 MG: 80 TABLET, FILM COATED ORAL at 21:37

## 2023-06-28 RX ADMIN — LEVOTHYROXINE SODIUM 25 MCG: 0.03 TABLET ORAL at 06:52

## 2023-06-28 RX ADMIN — PIPERACILLIN AND TAZOBACTAM 3375 MG: 3; .375 INJECTION, POWDER, LYOPHILIZED, FOR SOLUTION INTRAVENOUS at 17:06

## 2023-06-28 RX ADMIN — TAMSULOSIN HYDROCHLORIDE 0.4 MG: 0.4 CAPSULE ORAL at 09:28

## 2023-06-28 RX ADMIN — SODIUM CHLORIDE, PRESERVATIVE FREE 10 ML: 5 INJECTION INTRAVENOUS at 21:39

## 2023-06-28 RX ADMIN — Medication 240 MG: at 09:28

## 2023-06-28 RX ADMIN — PANTOPRAZOLE SODIUM 40 MG: 40 TABLET, DELAYED RELEASE ORAL at 15:25

## 2023-06-28 RX ADMIN — HYDROMORPHONE HYDROCHLORIDE 1 MG: 1 INJECTION, SOLUTION INTRAMUSCULAR; INTRAVENOUS; SUBCUTANEOUS at 16:46

## 2023-06-28 RX ADMIN — MORPHINE SULFATE 15 MG: 15 TABLET ORAL at 09:27

## 2023-06-28 RX ADMIN — PIPERACILLIN AND TAZOBACTAM 3375 MG: 3; .375 INJECTION, POWDER, LYOPHILIZED, FOR SOLUTION INTRAVENOUS at 02:45

## 2023-06-28 RX ADMIN — PIPERACILLIN AND TAZOBACTAM 3375 MG: 3; .375 INJECTION, POWDER, LYOPHILIZED, FOR SOLUTION INTRAVENOUS at 09:30

## 2023-06-28 RX ADMIN — HYDROMORPHONE HYDROCHLORIDE 1 MG: 1 INJECTION, SOLUTION INTRAMUSCULAR; INTRAVENOUS; SUBCUTANEOUS at 02:39

## 2023-06-28 RX ADMIN — METOPROLOL TARTRATE 37.5 MG: 25 TABLET, FILM COATED ORAL at 09:29

## 2023-06-28 RX ADMIN — LISINOPRIL 5 MG: 5 TABLET ORAL at 09:28

## 2023-06-28 RX ADMIN — PANTOPRAZOLE SODIUM 40 MG: 40 TABLET, DELAYED RELEASE ORAL at 06:52

## 2023-06-28 ASSESSMENT — PAIN DESCRIPTION - LOCATION
LOCATION: LEG
LOCATION: OTHER (COMMENT)
LOCATION: LEG
LOCATION: OTHER (COMMENT)
LOCATION: LEG
LOCATION: OTHER (COMMENT)

## 2023-06-28 ASSESSMENT — PAIN SCALES - GENERAL
PAINLEVEL_OUTOF10: 8
PAINLEVEL_OUTOF10: 5
PAINLEVEL_OUTOF10: 9
PAINLEVEL_OUTOF10: 7
PAINLEVEL_OUTOF10: 0
PAINLEVEL_OUTOF10: 3
PAINLEVEL_OUTOF10: 8

## 2023-06-28 ASSESSMENT — PAIN - FUNCTIONAL ASSESSMENT
PAIN_FUNCTIONAL_ASSESSMENT: PREVENTS OR INTERFERES SOME ACTIVE ACTIVITIES AND ADLS
PAIN_FUNCTIONAL_ASSESSMENT: PREVENTS OR INTERFERES WITH MANY ACTIVE NOT PASSIVE ACTIVITIES
PAIN_FUNCTIONAL_ASSESSMENT: PREVENTS OR INTERFERES SOME ACTIVE ACTIVITIES AND ADLS
PAIN_FUNCTIONAL_ASSESSMENT: PREVENTS OR INTERFERES WITH MANY ACTIVE NOT PASSIVE ACTIVITIES

## 2023-06-28 ASSESSMENT — PAIN DESCRIPTION - DESCRIPTORS
DESCRIPTORS: THROBBING;TIGHTNESS
DESCRIPTORS: ACHING
DESCRIPTORS: ACHING;THROBBING
DESCRIPTORS: THROBBING
DESCRIPTORS: ACHING
DESCRIPTORS: THROBBING;TIGHTNESS

## 2023-06-28 ASSESSMENT — PAIN DESCRIPTION - ORIENTATION
ORIENTATION: LEFT

## 2023-06-28 ASSESSMENT — PAIN DESCRIPTION - PAIN TYPE
TYPE: SURGICAL PAIN
TYPE: ACUTE PAIN;SURGICAL PAIN

## 2023-06-28 ASSESSMENT — PAIN DESCRIPTION - ONSET: ONSET: ON-GOING

## 2023-06-28 ASSESSMENT — PAIN SCALES - WONG BAKER
WONGBAKER_NUMERICALRESPONSE: 0
WONGBAKER_NUMERICALRESPONSE: 0

## 2023-06-28 ASSESSMENT — PAIN DESCRIPTION - FREQUENCY: FREQUENCY: CONTINUOUS

## 2023-06-29 PROBLEM — S78.112A UNILATERAL AKA, LEFT (HCC): Status: ACTIVE | Noted: 2023-06-29

## 2023-06-29 LAB
BACTERIA BLD AEROBE CULT: NORMAL
BACTERIA SPEC AEROBE CULT: ABNORMAL
BACTERIA SPEC ANAEROBE CULT: ABNORMAL
GRAM STN SPEC: ABNORMAL
ORGANISM: ABNORMAL
VANCOMYCIN SERPL-MCNC: 13.6 UG/ML (ref 0.1–39.9)

## 2023-06-29 PROCEDURE — 1200000000 HC SEMI PRIVATE

## 2023-06-29 PROCEDURE — 99222 1ST HOSP IP/OBS MODERATE 55: CPT | Performed by: NURSE PRACTITIONER

## 2023-06-29 PROCEDURE — 51701 INSERT BLADDER CATHETER: CPT

## 2023-06-29 PROCEDURE — 6360000002 HC RX W HCPCS: Performed by: NURSE PRACTITIONER

## 2023-06-29 PROCEDURE — 2580000003 HC RX 258: Performed by: SURGERY

## 2023-06-29 PROCEDURE — 99232 SBSQ HOSP IP/OBS MODERATE 35: CPT | Performed by: INTERNAL MEDICINE

## 2023-06-29 PROCEDURE — 6360000002 HC RX W HCPCS: Performed by: SURGERY

## 2023-06-29 PROCEDURE — 36415 COLL VENOUS BLD VENIPUNCTURE: CPT

## 2023-06-29 PROCEDURE — 2500000003 HC RX 250 WO HCPCS: Performed by: SURGERY

## 2023-06-29 PROCEDURE — 6370000000 HC RX 637 (ALT 250 FOR IP): Performed by: NURSE PRACTITIONER

## 2023-06-29 PROCEDURE — 6370000000 HC RX 637 (ALT 250 FOR IP): Performed by: INTERNAL MEDICINE

## 2023-06-29 PROCEDURE — 99024 POSTOP FOLLOW-UP VISIT: CPT | Performed by: SURGERY

## 2023-06-29 PROCEDURE — 80202 ASSAY OF VANCOMYCIN: CPT

## 2023-06-29 PROCEDURE — 6370000000 HC RX 637 (ALT 250 FOR IP): Performed by: SURGERY

## 2023-06-29 PROCEDURE — 97530 THERAPEUTIC ACTIVITIES: CPT

## 2023-06-29 PROCEDURE — 2580000003 HC RX 258: Performed by: NURSE PRACTITIONER

## 2023-06-29 PROCEDURE — 97110 THERAPEUTIC EXERCISES: CPT

## 2023-06-29 PROCEDURE — 97162 PT EVAL MOD COMPLEX 30 MIN: CPT

## 2023-06-29 RX ORDER — FINASTERIDE 5 MG/1
5 TABLET, FILM COATED ORAL DAILY
Status: DISCONTINUED | OUTPATIENT
Start: 2023-06-29 | End: 2023-07-01 | Stop reason: HOSPADM

## 2023-06-29 RX ORDER — POLYETHYLENE GLYCOL 3350 17 G/17G
17 POWDER, FOR SOLUTION ORAL DAILY
Status: DISCONTINUED | OUTPATIENT
Start: 2023-06-29 | End: 2023-07-01 | Stop reason: HOSPADM

## 2023-06-29 RX ORDER — DRONABINOL 2.5 MG/1
2.5 CAPSULE ORAL 2 TIMES DAILY
Status: DISCONTINUED | OUTPATIENT
Start: 2023-06-29 | End: 2023-07-01 | Stop reason: HOSPADM

## 2023-06-29 RX ADMIN — METOPROLOL TARTRATE 37.5 MG: 25 TABLET, FILM COATED ORAL at 09:47

## 2023-06-29 RX ADMIN — ATORVASTATIN CALCIUM 20 MG: 80 TABLET, FILM COATED ORAL at 21:50

## 2023-06-29 RX ADMIN — ASPIRIN 81 MG 81 MG: 81 TABLET ORAL at 09:48

## 2023-06-29 RX ADMIN — METOPROLOL TARTRATE 37.5 MG: 25 TABLET, FILM COATED ORAL at 21:51

## 2023-06-29 RX ADMIN — DOCUSATE SODIUM 50 MG AND SENNOSIDES 8.6 MG 3 TABLET: 8.6; 5 TABLET, FILM COATED ORAL at 09:48

## 2023-06-29 RX ADMIN — MORPHINE SULFATE 15 MG: 15 TABLET ORAL at 09:48

## 2023-06-29 RX ADMIN — DOCUSATE SODIUM 100 MG: 100 CAPSULE, LIQUID FILLED ORAL at 09:48

## 2023-06-29 RX ADMIN — SODIUM CHLORIDE, PRESERVATIVE FREE 10 ML: 5 INJECTION INTRAVENOUS at 09:51

## 2023-06-29 RX ADMIN — PIPERACILLIN AND TAZOBACTAM 3375 MG: 3; .375 INJECTION, POWDER, LYOPHILIZED, FOR SOLUTION INTRAVENOUS at 10:00

## 2023-06-29 RX ADMIN — Medication 240 MG: at 09:48

## 2023-06-29 RX ADMIN — LISINOPRIL 5 MG: 5 TABLET ORAL at 09:47

## 2023-06-29 RX ADMIN — HYDROCODONE BITARTRATE AND ACETAMINOPHEN 1 TABLET: 7.5; 325 TABLET ORAL at 18:55

## 2023-06-29 RX ADMIN — TAMSULOSIN HYDROCHLORIDE 0.4 MG: 0.4 CAPSULE ORAL at 09:48

## 2023-06-29 RX ADMIN — PANTOPRAZOLE SODIUM 40 MG: 40 TABLET, DELAYED RELEASE ORAL at 15:41

## 2023-06-29 RX ADMIN — LEVOTHYROXINE SODIUM 25 MCG: 0.03 TABLET ORAL at 09:51

## 2023-06-29 RX ADMIN — Medication 0.5 TABLET: at 09:48

## 2023-06-29 RX ADMIN — SODIUM CHLORIDE: 9 INJECTION, SOLUTION INTRAVENOUS at 13:43

## 2023-06-29 RX ADMIN — MORPHINE SULFATE 15 MG: 15 TABLET ORAL at 21:51

## 2023-06-29 RX ADMIN — FOLIC ACID 1 MG: 1 TABLET ORAL at 09:49

## 2023-06-29 RX ADMIN — DRONABINOL 2.5 MG: 2.5 CAPSULE ORAL at 22:07

## 2023-06-29 RX ADMIN — OXYCODONE HYDROCHLORIDE AND ACETAMINOPHEN 250 MG: 500 TABLET ORAL at 09:49

## 2023-06-29 RX ADMIN — SODIUM CHLORIDE: 9 INJECTION, SOLUTION INTRAVENOUS at 21:47

## 2023-06-29 RX ADMIN — DRONABINOL 2.5 MG: 2.5 CAPSULE ORAL at 13:39

## 2023-06-29 RX ADMIN — Medication 100 MG: at 09:48

## 2023-06-29 RX ADMIN — PIPERACILLIN AND TAZOBACTAM 3375 MG: 3; .375 INJECTION, POWDER, LYOPHILIZED, FOR SOLUTION INTRAVENOUS at 01:52

## 2023-06-29 RX ADMIN — PANTOPRAZOLE SODIUM 40 MG: 40 TABLET, DELAYED RELEASE ORAL at 10:15

## 2023-06-29 RX ADMIN — HYDROCODONE BITARTRATE AND ACETAMINOPHEN 1 TABLET: 7.5; 325 TABLET ORAL at 02:18

## 2023-06-29 RX ADMIN — HYDROMORPHONE HYDROCHLORIDE 1 MG: 1 INJECTION, SOLUTION INTRAMUSCULAR; INTRAVENOUS; SUBCUTANEOUS at 01:59

## 2023-06-29 RX ADMIN — DOCUSATE SODIUM 100 MG: 100 CAPSULE, LIQUID FILLED ORAL at 21:50

## 2023-06-29 RX ADMIN — FINASTERIDE 5 MG: 5 TABLET, FILM COATED ORAL at 13:39

## 2023-06-29 RX ADMIN — ENOXAPARIN SODIUM 40 MG: 100 INJECTION SUBCUTANEOUS at 10:17

## 2023-06-29 ASSESSMENT — PAIN SCALES - GENERAL
PAINLEVEL_OUTOF10: 8

## 2023-06-29 ASSESSMENT — PAIN DESCRIPTION - ORIENTATION
ORIENTATION: LEFT

## 2023-06-29 ASSESSMENT — PAIN DESCRIPTION - LOCATION
LOCATION: LEG
LOCATION: INCISION
LOCATION: LEG
LOCATION: LEG

## 2023-06-29 ASSESSMENT — PAIN DESCRIPTION - DESCRIPTORS
DESCRIPTORS: ACHING

## 2023-06-29 ASSESSMENT — PAIN DESCRIPTION - FREQUENCY: FREQUENCY: CONTINUOUS

## 2023-06-29 ASSESSMENT — PAIN DESCRIPTION - ONSET: ONSET: ON-GOING

## 2023-06-29 ASSESSMENT — PAIN SCALES - WONG BAKER: WONGBAKER_NUMERICALRESPONSE: 2

## 2023-06-29 ASSESSMENT — PAIN DESCRIPTION - PAIN TYPE: TYPE: ACUTE PAIN;SURGICAL PAIN

## 2023-06-30 LAB
AMPHETAMINES UR QL SCN: NEGATIVE
BACTERIA: ABNORMAL
BARBITURATES UR QL SCN: NEGATIVE
BASOPHILS ABSOLUTE: 0 THOU/MM3 (ref 0–0.1)
BASOPHILS NFR BLD AUTO: 0.4 %
BENZODIAZ UR QL SCN: NEGATIVE
BILIRUB UR QL STRIP: NEGATIVE
BZE UR QL SCN: NEGATIVE
CANNABINOIDS UR QL SCN: POSITIVE
CASTS #/AREA URNS LPF: ABNORMAL /LPF
CASTS #/AREA URNS LPF: ABNORMAL /LPF
CHARACTER UR: CLEAR
CHARCOAL URNS QL MICRO: ABNORMAL
COLOR UR: YELLOW
CRYSTALS URNS QL MICRO: ABNORMAL
DEPRECATED RDW RBC AUTO: 48.7 FL (ref 35–45)
EOSINOPHIL NFR BLD AUTO: 3.6 %
EOSINOPHILS ABSOLUTE: 0.4 THOU/MM3 (ref 0–0.4)
EPITHELIAL CELLS, UA: ABNORMAL /HPF
ERYTHROCYTE [DISTWIDTH] IN BLOOD BY AUTOMATED COUNT: 13.8 % (ref 11.5–14.5)
FENTANYL: POSITIVE
GLUCOSE UR QL STRIP.AUTO: NEGATIVE MG/DL
HCT VFR BLD AUTO: 29.4 % (ref 42–52)
HGB BLD-MCNC: 9.2 GM/DL (ref 14–18)
HGB UR QL STRIP.AUTO: ABNORMAL
IMM GRANULOCYTES # BLD AUTO: 0.15 THOU/MM3 (ref 0–0.07)
IMM GRANULOCYTES NFR BLD AUTO: 1.3 %
KETONES UR QL STRIP.AUTO: NEGATIVE
LEUKOCYTE ESTERASE UR QL STRIP.AUTO: ABNORMAL
LYMPHOCYTES ABSOLUTE: 1.3 THOU/MM3 (ref 1–4.8)
LYMPHOCYTES NFR BLD AUTO: 11.6 %
MCH RBC QN AUTO: 30.2 PG (ref 26–33)
MCHC RBC AUTO-ENTMCNC: 31.3 GM/DL (ref 32.2–35.5)
MCV RBC AUTO: 96.4 FL (ref 80–94)
MONOCYTES ABSOLUTE: 0.7 THOU/MM3 (ref 0.4–1.3)
MONOCYTES NFR BLD AUTO: 6 %
NEUTROPHILS NFR BLD AUTO: 77.1 %
NITRITE UR QL STRIP.AUTO: NEGATIVE
NRBC BLD AUTO-RTO: 0 /100 WBC
OPIATES UR QL SCN: POSITIVE
OXYCODONE: POSITIVE
PCP UR QL SCN: NEGATIVE
PH UR STRIP.AUTO: 5.5 [PH] (ref 5–9)
PLATELET # BLD AUTO: 195 THOU/MM3 (ref 130–400)
PMV BLD AUTO: 9.8 FL (ref 9.4–12.4)
PROT UR STRIP.AUTO-MCNC: NEGATIVE MG/DL
RBC # BLD AUTO: 3.05 MILL/MM3 (ref 4.7–6.1)
RBC #/AREA URNS HPF: ABNORMAL /HPF
RENAL EPI CELLS #/AREA URNS HPF: ABNORMAL /[HPF]
SEGMENTED NEUTROPHILS ABSOLUTE COUNT: 8.9 THOU/MM3 (ref 1.8–7.7)
SP GR UR REFRACT.AUTO: 1.01 (ref 1–1.03)
UROBILINOGEN UR QL STRIP.AUTO: 1 EU/DL (ref 0–1)
WBC # BLD AUTO: 11.5 THOU/MM3 (ref 4.8–10.8)
WBC #/AREA URNS HPF: ABNORMAL /HPF
YEAST LIKE FUNGI URNS QL MICRO: ABNORMAL

## 2023-06-30 PROCEDURE — 6370000000 HC RX 637 (ALT 250 FOR IP): Performed by: SURGERY

## 2023-06-30 PROCEDURE — 6370000000 HC RX 637 (ALT 250 FOR IP): Performed by: NURSE PRACTITIONER

## 2023-06-30 PROCEDURE — 99024 POSTOP FOLLOW-UP VISIT: CPT | Performed by: SURGERY

## 2023-06-30 PROCEDURE — 99232 SBSQ HOSP IP/OBS MODERATE 35: CPT | Performed by: INTERNAL MEDICINE

## 2023-06-30 PROCEDURE — 81003 URINALYSIS AUTO W/O SCOPE: CPT

## 2023-06-30 PROCEDURE — 80307 DRUG TEST PRSMV CHEM ANLYZR: CPT

## 2023-06-30 PROCEDURE — 6370000000 HC RX 637 (ALT 250 FOR IP): Performed by: INTERNAL MEDICINE

## 2023-06-30 PROCEDURE — 81001 URINALYSIS AUTO W/SCOPE: CPT

## 2023-06-30 PROCEDURE — 1200000000 HC SEMI PRIVATE

## 2023-06-30 PROCEDURE — 85025 COMPLETE CBC W/AUTO DIFF WBC: CPT

## 2023-06-30 PROCEDURE — 97166 OT EVAL MOD COMPLEX 45 MIN: CPT

## 2023-06-30 PROCEDURE — 6360000002 HC RX W HCPCS: Performed by: SURGERY

## 2023-06-30 PROCEDURE — 2580000003 HC RX 258: Performed by: SURGERY

## 2023-06-30 PROCEDURE — 36415 COLL VENOUS BLD VENIPUNCTURE: CPT

## 2023-06-30 PROCEDURE — 99231 SBSQ HOSP IP/OBS SF/LOW 25: CPT | Performed by: NURSE PRACTITIONER

## 2023-06-30 PROCEDURE — 97535 SELF CARE MNGMENT TRAINING: CPT

## 2023-06-30 RX ORDER — OXYCODONE HYDROCHLORIDE 5 MG/1
5 TABLET ORAL EVERY 4 HOURS PRN
Status: DISCONTINUED | OUTPATIENT
Start: 2023-06-30 | End: 2023-07-01 | Stop reason: HOSPADM

## 2023-06-30 RX ORDER — OXYCODONE HYDROCHLORIDE 5 MG/1
10 TABLET ORAL EVERY 4 HOURS PRN
Status: DISCONTINUED | OUTPATIENT
Start: 2023-06-30 | End: 2023-07-01 | Stop reason: HOSPADM

## 2023-06-30 RX ADMIN — ASPIRIN 81 MG 81 MG: 81 TABLET ORAL at 08:16

## 2023-06-30 RX ADMIN — ENOXAPARIN SODIUM 40 MG: 100 INJECTION SUBCUTANEOUS at 11:46

## 2023-06-30 RX ADMIN — ATORVASTATIN CALCIUM 20 MG: 80 TABLET, FILM COATED ORAL at 20:29

## 2023-06-30 RX ADMIN — OXYCODONE HYDROCHLORIDE AND ACETAMINOPHEN 250 MG: 500 TABLET ORAL at 08:16

## 2023-06-30 RX ADMIN — PANTOPRAZOLE SODIUM 40 MG: 40 TABLET, DELAYED RELEASE ORAL at 06:05

## 2023-06-30 RX ADMIN — SODIUM CHLORIDE: 9 INJECTION, SOLUTION INTRAVENOUS at 06:02

## 2023-06-30 RX ADMIN — SODIUM CHLORIDE: 9 INJECTION, SOLUTION INTRAVENOUS at 20:31

## 2023-06-30 RX ADMIN — FOLIC ACID 1 MG: 1 TABLET ORAL at 08:16

## 2023-06-30 RX ADMIN — DRONABINOL 2.5 MG: 2.5 CAPSULE ORAL at 20:37

## 2023-06-30 RX ADMIN — Medication 0.5 TABLET: at 08:16

## 2023-06-30 RX ADMIN — FINASTERIDE 5 MG: 5 TABLET, FILM COATED ORAL at 11:45

## 2023-06-30 RX ADMIN — OXYCODONE HYDROCHLORIDE 10 MG: 5 TABLET ORAL at 14:11

## 2023-06-30 RX ADMIN — DRONABINOL 2.5 MG: 2.5 CAPSULE ORAL at 11:45

## 2023-06-30 RX ADMIN — MORPHINE SULFATE 15 MG: 15 TABLET ORAL at 08:21

## 2023-06-30 RX ADMIN — LISINOPRIL 5 MG: 5 TABLET ORAL at 08:15

## 2023-06-30 RX ADMIN — DOCUSATE SODIUM 100 MG: 100 CAPSULE, LIQUID FILLED ORAL at 20:29

## 2023-06-30 RX ADMIN — TAMSULOSIN HYDROCHLORIDE 0.4 MG: 0.4 CAPSULE ORAL at 08:16

## 2023-06-30 RX ADMIN — Medication 240 MG: at 08:15

## 2023-06-30 RX ADMIN — DOCUSATE SODIUM 100 MG: 100 CAPSULE, LIQUID FILLED ORAL at 08:16

## 2023-06-30 RX ADMIN — PANTOPRAZOLE SODIUM 40 MG: 40 TABLET, DELAYED RELEASE ORAL at 16:16

## 2023-06-30 RX ADMIN — LEVOTHYROXINE SODIUM 25 MCG: 0.03 TABLET ORAL at 06:05

## 2023-06-30 RX ADMIN — METOPROLOL TARTRATE 37.5 MG: 25 TABLET, FILM COATED ORAL at 22:39

## 2023-06-30 RX ADMIN — MORPHINE SULFATE 15 MG: 15 TABLET ORAL at 20:29

## 2023-06-30 RX ADMIN — Medication 100 MG: at 08:16

## 2023-06-30 ASSESSMENT — PAIN SCALES - GENERAL
PAINLEVEL_OUTOF10: 3
PAINLEVEL_OUTOF10: 9

## 2023-07-01 VITALS
SYSTOLIC BLOOD PRESSURE: 114 MMHG | TEMPERATURE: 98 F | OXYGEN SATURATION: 92 % | WEIGHT: 135 LBS | BODY MASS INDEX: 19.99 KG/M2 | RESPIRATION RATE: 20 BRPM | DIASTOLIC BLOOD PRESSURE: 75 MMHG | HEART RATE: 72 BPM | HEIGHT: 69 IN

## 2023-07-01 LAB — BACTERIA BLD AEROBE CULT: NORMAL

## 2023-07-01 PROCEDURE — 99239 HOSP IP/OBS DSCHRG MGMT >30: CPT | Performed by: INTERNAL MEDICINE

## 2023-07-01 PROCEDURE — 6370000000 HC RX 637 (ALT 250 FOR IP): Performed by: INTERNAL MEDICINE

## 2023-07-01 PROCEDURE — 6370000000 HC RX 637 (ALT 250 FOR IP): Performed by: SURGERY

## 2023-07-01 PROCEDURE — 2580000003 HC RX 258: Performed by: SURGERY

## 2023-07-01 PROCEDURE — 6370000000 HC RX 637 (ALT 250 FOR IP): Performed by: NURSE PRACTITIONER

## 2023-07-01 RX ORDER — METOPROLOL TARTRATE 37.5 MG/1
37.5 TABLET, FILM COATED ORAL EVERY 12 HOURS
Qty: 60 TABLET | Refills: 3 | Status: SHIPPED | OUTPATIENT
Start: 2023-07-01

## 2023-07-01 RX ORDER — TAMSULOSIN HYDROCHLORIDE 0.4 MG/1
0.4 CAPSULE ORAL DAILY
Qty: 30 CAPSULE | Refills: 2 | Status: SHIPPED | OUTPATIENT
Start: 2023-07-01

## 2023-07-01 RX ORDER — LISINOPRIL 5 MG/1
5 TABLET ORAL DAILY
Qty: 30 TABLET | Refills: 3 | Status: SHIPPED | OUTPATIENT
Start: 2023-07-02

## 2023-07-01 RX ORDER — GABAPENTIN 100 MG/1
100 CAPSULE ORAL 2 TIMES DAILY
Qty: 180 CAPSULE | Refills: 1 | Status: SHIPPED | OUTPATIENT
Start: 2023-07-01 | End: 2023-12-28

## 2023-07-01 RX ORDER — ASPIRIN 81 MG/1
81 TABLET, CHEWABLE ORAL DAILY
Qty: 30 TABLET | Refills: 3 | Status: SHIPPED | OUTPATIENT
Start: 2023-07-02

## 2023-07-01 RX ORDER — HYDROCODONE BITARTRATE AND ACETAMINOPHEN 5; 325 MG/1; MG/1
1 TABLET ORAL EVERY 4 HOURS PRN
Qty: 20 TABLET | Refills: 0 | Status: SHIPPED | OUTPATIENT
Start: 2023-07-01 | End: 2023-07-04

## 2023-07-01 RX ORDER — ALBUTEROL SULFATE 90 UG/1
2 AEROSOL, METERED RESPIRATORY (INHALATION) EVERY 6 HOURS PRN
Qty: 18 G | Refills: 3 | Status: SHIPPED | OUTPATIENT
Start: 2023-07-01

## 2023-07-01 RX ORDER — LEVOTHYROXINE SODIUM 0.03 MG/1
25 TABLET ORAL DAILY
Qty: 30 TABLET | Refills: 3 | Status: SHIPPED | OUTPATIENT
Start: 2023-07-02

## 2023-07-01 RX ADMIN — SODIUM CHLORIDE: 9 INJECTION, SOLUTION INTRAVENOUS at 05:12

## 2023-07-01 RX ADMIN — LISINOPRIL 5 MG: 5 TABLET ORAL at 09:18

## 2023-07-01 RX ADMIN — FOLIC ACID 1 MG: 1 TABLET ORAL at 09:17

## 2023-07-01 RX ADMIN — DRONABINOL 2.5 MG: 2.5 CAPSULE ORAL at 10:43

## 2023-07-01 RX ADMIN — FINASTERIDE 5 MG: 5 TABLET, FILM COATED ORAL at 09:18

## 2023-07-01 RX ADMIN — Medication 100 MG: at 09:17

## 2023-07-01 RX ADMIN — LEVOTHYROXINE SODIUM 25 MCG: 0.03 TABLET ORAL at 05:10

## 2023-07-01 RX ADMIN — METOPROLOL TARTRATE 37.5 MG: 25 TABLET, FILM COATED ORAL at 09:18

## 2023-07-01 RX ADMIN — DOCUSATE SODIUM 100 MG: 100 CAPSULE, LIQUID FILLED ORAL at 09:18

## 2023-07-01 RX ADMIN — ASPIRIN 81 MG 81 MG: 81 TABLET ORAL at 09:18

## 2023-07-01 RX ADMIN — MORPHINE SULFATE 15 MG: 15 TABLET ORAL at 09:17

## 2023-07-01 RX ADMIN — Medication 0.5 TABLET: at 09:18

## 2023-07-01 RX ADMIN — PANTOPRAZOLE SODIUM 40 MG: 40 TABLET, DELAYED RELEASE ORAL at 05:10

## 2023-07-01 RX ADMIN — TAMSULOSIN HYDROCHLORIDE 0.4 MG: 0.4 CAPSULE ORAL at 09:18

## 2023-07-01 RX ADMIN — OXYCODONE HYDROCHLORIDE AND ACETAMINOPHEN 250 MG: 500 TABLET ORAL at 09:18

## 2023-07-01 ASSESSMENT — PAIN SCALES - GENERAL: PAINLEVEL_OUTOF10: 0

## 2023-07-03 NOTE — CARE COORDINATION
7/3/23, 4:49 PM EDT  Late Entry  Patient goals/plan/ treatment preferences discussed by  and . Patient goals/plan/ treatment preferences reviewed with patient/ family. Patient/ family verbalize understanding of discharge plan and are in agreement with goal/plan/treatment preferences. Understanding was demonstrated using the teach back method. AVS provided by RN at time of discharge, which includes all necessary medical information pertaining to the patients current course of illness, treatment, post-discharge goals of care, and treatment preferences. Services At/After Discharge: Home Health, Aide services, Nursing service, OT, and PT       IMM Letter  IMM Letter given to Patient/Family/Significant other/Guardian/POA/by[de-identified] Jes Mckinney RN CM  IMM Letter date given[de-identified] 06/30/23  IMM Letter time given[de-identified] 5830     Patient discharged on 7/1 to home with daughter and resume [facility]. Message left for [facility] notifying of discharge on 7/1.

## (undated) DEVICE — BANDAGE COMPR E SGL LAYERED CLSR BGE W/ CLP W4INXL15FT

## (undated) DEVICE — GLOVE ORTHO 8   MSG9480

## (undated) DEVICE — Device

## (undated) DEVICE — SPONGE LAP W18XL18IN WHT COT 4 PLY FLD STRUNG RADPQ DISP ST 2 PER PACK

## (undated) DEVICE — PACK PROCEDURE SURG ORTH BASIC SRHP LF

## (undated) DEVICE — PREP SOL PVP IODINE 4%  4 OZ/BTL

## (undated) DEVICE — GLOVE ORANGE PI 8   MSG9080

## (undated) DEVICE — SUTURE PERMA-HAND SZ 0 L24IN NONABSORBABLE BLK W/O NDL SILK SA76G

## (undated) DEVICE — SUTURE ETHBND EXCEL SZ 2 L30IN NONABSORBABLE GRN L40MM V-37 MX69G

## (undated) DEVICE — SOLUTION SURG PREP SCRUB POV IOD 7.5% 4 OZ

## (undated) DEVICE — GOWN,AURORA,NON-REINFORCED,2XL: Brand: MEDLINE

## (undated) DEVICE — GLOVE SURG SZ 85 L12IN THK75MIL DK GRN LTX FREE

## (undated) DEVICE — SUTURE VCRL + SZ 2 0 L27IN ABSRB UD CT 1 L36MM 1 2 CIR TAPR VCPP42D

## (undated) DEVICE — PAD,ABDOMINAL,5"X9",ST,LF,25/BX: Brand: MEDLINE INDUSTRIES, INC.

## (undated) DEVICE — WAX SURG 2.5GM HEMSTAT BNE BEESWAX PARAFFIN ISO PALMITATE

## (undated) DEVICE — DUAL CUT SAGITTAL BLADE

## (undated) DEVICE — GLOVE ORANGE PI 8 1/2   MSG9085

## (undated) DEVICE — SEALER ENDOSCP NANO COAT OPN DIV CRV L JAW LIGASURE IMPACT

## (undated) DEVICE — DRESSING,GAUZE,XEROFORM,CURAD,5"X9",ST: Brand: CURAD

## (undated) DEVICE — BIOGUARD A/W CLEANING ADAPTER

## (undated) DEVICE — SUTURE VCRL + SZ 0 L18IN ABSRB UD L36MM CT-1 1/2 CIR VCP840D

## (undated) DEVICE — SUTURE VCRL SZ 2-0 L18IN ABSRB VLT L26MM SH 1/2 CIR J775D

## (undated) DEVICE — HYPODERMIC SAFETY NEEDLE: Brand: MAGELLAN

## (undated) DEVICE — PREMIUM DRY TRAY LF: Brand: MEDLINE INDUSTRIES, INC.

## (undated) DEVICE — SUTURE VICRYL PLUS TAPERPOINT ANTIBAC BRD SH NDL 3-0

## (undated) DEVICE — BANDAGE COBAN 4 IN COMPR W4INXL5YD FOAM COHESIVE QUIK STK SELF ADH SFT

## (undated) DEVICE — SUTURE ETHBND EXCEL SZ 2-0 L30IN NONABSORBABLE GRN L26MM SH X833H

## (undated) DEVICE — BANDAGE,GAUZE,4.5"X4.1YD,STERILE,LF: Brand: MEDLINE

## (undated) DEVICE — 60-7075-104 TRNQT,SPSB,PLC GREEN: Brand: MEDLINE RENEWAL